# Patient Record
Sex: MALE | Race: WHITE | Employment: FULL TIME | URBAN - METROPOLITAN AREA
[De-identification: names, ages, dates, MRNs, and addresses within clinical notes are randomized per-mention and may not be internally consistent; named-entity substitution may affect disease eponyms.]

---

## 2017-05-31 ENCOUNTER — TELEPHONE (OUTPATIENT)
Dept: FAMILY MEDICINE CLINIC | Age: 57
End: 2017-05-31

## 2017-05-31 DIAGNOSIS — E11.9 CONTROLLED TYPE 2 DIABETES MELLITUS WITHOUT COMPLICATION, WITHOUT LONG-TERM CURRENT USE OF INSULIN (HCC): ICD-10-CM

## 2017-05-31 DIAGNOSIS — I25.10 CORONARY ARTERY DISEASE INVOLVING NATIVE CORONARY ARTERY OF NATIVE HEART WITHOUT ANGINA PECTORIS: ICD-10-CM

## 2017-05-31 DIAGNOSIS — E78.00 HYPERCHOLESTEROLEMIA: Primary | ICD-10-CM

## 2017-05-31 NOTE — TELEPHONE ENCOUNTER
Pt called wanting to know if there are any lab work Dr. Sultana Lieberman would like him to get before his physical in July.  Please assist.

## 2017-07-27 ENCOUNTER — OFFICE VISIT (OUTPATIENT)
Dept: FAMILY MEDICINE CLINIC | Age: 57
End: 2017-07-27

## 2017-07-27 ENCOUNTER — HOSPITAL ENCOUNTER (OUTPATIENT)
Dept: LAB | Age: 57
Discharge: HOME | End: 2017-07-27
Payer: COMMERCIAL

## 2017-07-27 VITALS
SYSTOLIC BLOOD PRESSURE: 160 MMHG | BODY MASS INDEX: 40.43 KG/M2 | HEIGHT: 74 IN | DIASTOLIC BLOOD PRESSURE: 102 MMHG | RESPIRATION RATE: 16 BRPM | WEIGHT: 315 LBS | TEMPERATURE: 98.3 F | OXYGEN SATURATION: 93 % | HEART RATE: 77 BPM

## 2017-07-27 DIAGNOSIS — M19.90 ARTHRITIS: ICD-10-CM

## 2017-07-27 DIAGNOSIS — Z00.00 ROUTINE GENERAL MEDICAL EXAMINATION AT A HEALTH CARE FACILITY: ICD-10-CM

## 2017-07-27 DIAGNOSIS — I25.10 CORONARY ARTERY DISEASE INVOLVING NATIVE CORONARY ARTERY OF NATIVE HEART WITHOUT ANGINA PECTORIS: ICD-10-CM

## 2017-07-27 DIAGNOSIS — M79.641 HAND PAIN, RIGHT: ICD-10-CM

## 2017-07-27 DIAGNOSIS — N20.0 KIDNEY STONES: ICD-10-CM

## 2017-07-27 DIAGNOSIS — E11.9 CONTROLLED TYPE 2 DIABETES MELLITUS WITHOUT COMPLICATION, WITHOUT LONG-TERM CURRENT USE OF INSULIN (HCC): ICD-10-CM

## 2017-07-27 DIAGNOSIS — E78.00 HYPERCHOLESTEROLEMIA: ICD-10-CM

## 2017-07-27 DIAGNOSIS — N20.0 RENAL CALCULI: ICD-10-CM

## 2017-07-27 DIAGNOSIS — I10 ESSENTIAL HYPERTENSION, MALIGNANT: Primary | ICD-10-CM

## 2017-07-27 DIAGNOSIS — H60.549 ECZEMA OF EXTERNAL EAR, UNSPECIFIED LATERALITY: ICD-10-CM

## 2017-07-27 DIAGNOSIS — Z23 ENCOUNTER FOR IMMUNIZATION: ICD-10-CM

## 2017-07-27 DIAGNOSIS — I10 ESSENTIAL HYPERTENSION, MALIGNANT: ICD-10-CM

## 2017-07-27 DIAGNOSIS — L30.9 ECZEMA, UNSPECIFIED TYPE: ICD-10-CM

## 2017-07-27 PROBLEM — H60.543 ECZEMA OF BOTH EXTERNAL EARS: Status: ACTIVE | Noted: 2017-07-27

## 2017-07-27 LAB
ALBUMIN SERPL BCP-MCNC: 3.6 G/DL (ref 3.4–5)
ALBUMIN/GLOB SERPL: 1.2 {RATIO} (ref 0.8–1.7)
ALP SERPL-CCNC: 101 U/L (ref 45–117)
ALT SERPL-CCNC: 28 U/L (ref 16–61)
ANION GAP BLD CALC-SCNC: 8 MMOL/L (ref 3–18)
APPEARANCE UR: CLEAR
AST SERPL W P-5'-P-CCNC: 14 U/L (ref 15–37)
BASOPHILS # BLD AUTO: 0 K/UL (ref 0–0.06)
BASOPHILS # BLD: 0 % (ref 0–2)
BILIRUB SERPL-MCNC: 0.6 MG/DL (ref 0.2–1)
BILIRUB UR QL: NEGATIVE
BUN SERPL-MCNC: 15 MG/DL (ref 7–18)
BUN/CREAT SERPL: 17 (ref 12–20)
CALCIUM SERPL-MCNC: 8.1 MG/DL (ref 8.5–10.1)
CHLORIDE SERPL-SCNC: 101 MMOL/L (ref 100–108)
CHOLEST SERPL-MCNC: 188 MG/DL
CO2 SERPL-SCNC: 31 MMOL/L (ref 21–32)
COLOR UR: YELLOW
CREAT SERPL-MCNC: 0.86 MG/DL (ref 0.6–1.3)
CREAT UR-MCNC: 221.44 MG/DL (ref 30–125)
DIFFERENTIAL METHOD BLD: ABNORMAL
EOSINOPHIL # BLD: 0.1 K/UL (ref 0–0.4)
EOSINOPHIL NFR BLD: 2 % (ref 0–5)
ERYTHROCYTE [DISTWIDTH] IN BLOOD BY AUTOMATED COUNT: 14.6 % (ref 11.6–14.5)
EST. AVERAGE GLUCOSE BLD GHB EST-MCNC: 200 MG/DL
GLOBULIN SER CALC-MCNC: 3 G/DL (ref 2–4)
GLUCOSE SERPL-MCNC: 213 MG/DL (ref 74–99)
GLUCOSE UR STRIP.AUTO-MCNC: >1000 MG/DL
HBA1C MFR BLD: 8.6 % (ref 4.2–5.6)
HCT VFR BLD AUTO: 50.2 % (ref 36–48)
HDLC SERPL-MCNC: 41 MG/DL (ref 40–60)
HDLC SERPL: 4.6 {RATIO} (ref 0–5)
HGB BLD-MCNC: 16.6 G/DL (ref 13–16)
HGB UR QL STRIP: NEGATIVE
KETONES UR QL STRIP.AUTO: NEGATIVE MG/DL
LDLC SERPL CALC-MCNC: 120.4 MG/DL (ref 0–100)
LEUKOCYTE ESTERASE UR QL STRIP.AUTO: NEGATIVE
LIPID PROFILE,FLP: ABNORMAL
LYMPHOCYTES # BLD AUTO: 27 % (ref 21–52)
LYMPHOCYTES # BLD: 1.8 K/UL (ref 0.9–3.6)
MCH RBC QN AUTO: 29.7 PG (ref 24–34)
MCHC RBC AUTO-ENTMCNC: 33.1 G/DL (ref 31–37)
MCV RBC AUTO: 90 FL (ref 74–97)
MICROALBUMIN UR-MCNC: 2.9 MG/DL (ref 0–3)
MICROALBUMIN/CREAT UR-RTO: 13 MG/G (ref 0–30)
MONOCYTES # BLD: 0.7 K/UL (ref 0.05–1.2)
MONOCYTES NFR BLD AUTO: 10 % (ref 3–10)
NEUTS SEG # BLD: 3.9 K/UL (ref 1.8–8)
NEUTS SEG NFR BLD AUTO: 61 % (ref 40–73)
NITRITE UR QL STRIP.AUTO: NEGATIVE
PH UR STRIP: 6 [PH] (ref 5–8)
PLATELET # BLD AUTO: 186 K/UL (ref 135–420)
PMV BLD AUTO: 10.4 FL (ref 9.2–11.8)
POTASSIUM SERPL-SCNC: 4.4 MMOL/L (ref 3.5–5.5)
PROT SERPL-MCNC: 6.6 G/DL (ref 6.4–8.2)
PROT UR STRIP-MCNC: NEGATIVE MG/DL
PSA SERPL-MCNC: 0.7 NG/ML (ref 0–4)
RBC # BLD AUTO: 5.58 M/UL (ref 4.7–5.5)
SODIUM SERPL-SCNC: 140 MMOL/L (ref 136–145)
SP GR UR REFRACTOMETRY: >1.03 (ref 1–1.03)
TRIGL SERPL-MCNC: 133 MG/DL (ref ?–150)
TSH SERPL DL<=0.05 MIU/L-ACNC: 1.05 UIU/ML (ref 0.36–3.74)
URATE SERPL-MCNC: 4.4 MG/DL (ref 2.6–7.2)
UROBILINOGEN UR QL STRIP.AUTO: 1 EU/DL (ref 0.2–1)
VLDLC SERPL CALC-MCNC: 26.6 MG/DL
WBC # BLD AUTO: 6.5 K/UL (ref 4.6–13.2)

## 2017-07-27 PROCEDURE — 83036 HEMOGLOBIN GLYCOSYLATED A1C: CPT | Performed by: FAMILY MEDICINE

## 2017-07-27 PROCEDURE — 84550 ASSAY OF BLOOD/URIC ACID: CPT | Performed by: FAMILY MEDICINE

## 2017-07-27 PROCEDURE — 36415 COLL VENOUS BLD VENIPUNCTURE: CPT | Performed by: FAMILY MEDICINE

## 2017-07-27 PROCEDURE — 85025 COMPLETE CBC W/AUTO DIFF WBC: CPT | Performed by: FAMILY MEDICINE

## 2017-07-27 PROCEDURE — 80053 COMPREHEN METABOLIC PANEL: CPT | Performed by: FAMILY MEDICINE

## 2017-07-27 PROCEDURE — 82043 UR ALBUMIN QUANTITATIVE: CPT | Performed by: FAMILY MEDICINE

## 2017-07-27 PROCEDURE — 86803 HEPATITIS C AB TEST: CPT | Performed by: FAMILY MEDICINE

## 2017-07-27 PROCEDURE — 80061 LIPID PANEL: CPT | Performed by: FAMILY MEDICINE

## 2017-07-27 PROCEDURE — 84443 ASSAY THYROID STIM HORMONE: CPT | Performed by: FAMILY MEDICINE

## 2017-07-27 PROCEDURE — 81003 URINALYSIS AUTO W/O SCOPE: CPT | Performed by: FAMILY MEDICINE

## 2017-07-27 PROCEDURE — 84153 ASSAY OF PSA TOTAL: CPT | Performed by: FAMILY MEDICINE

## 2017-07-27 RX ORDER — HYDROCHLOROTHIAZIDE 12.5 MG/1
12.5 TABLET ORAL DAILY
Qty: 90 TAB | Refills: 4 | Status: SHIPPED | OUTPATIENT
Start: 2017-07-27 | End: 2018-07-10 | Stop reason: SDUPTHER

## 2017-07-27 RX ORDER — METFORMIN HYDROCHLORIDE 500 MG/1
500 TABLET ORAL
Qty: 90 TAB | Refills: 4 | Status: SHIPPED | OUTPATIENT
Start: 2017-07-27 | End: 2017-10-30

## 2017-07-27 RX ORDER — HYDROCORTISONE AND ACETIC ACID 20.75; 10.375 MG/ML; MG/ML
2 SOLUTION AURICULAR (OTIC) 2 TIMES DAILY
Qty: 10 ML | Refills: 12 | Status: SHIPPED | OUTPATIENT
Start: 2017-07-27 | End: 2018-07-10 | Stop reason: SDUPTHER

## 2017-07-27 RX ORDER — NAPROXEN 375 MG/1
375 TABLET ORAL 2 TIMES DAILY WITH MEALS
Qty: 180 TAB | Refills: 4 | Status: SHIPPED | OUTPATIENT
Start: 2017-07-27 | End: 2018-07-10 | Stop reason: SDUPTHER

## 2017-07-27 RX ORDER — AMLODIPINE BESYLATE 5 MG/1
5 TABLET ORAL DAILY
Qty: 90 TAB | Refills: 4 | Status: SHIPPED | OUTPATIENT
Start: 2017-07-27 | End: 2018-07-10 | Stop reason: SDUPTHER

## 2017-07-27 NOTE — PROGRESS NOTES
Jesus Landry is a 65 y.o. male patient presented to clinic for a complete physical. Pt c/o 5/10 right hand pain. Pt has many concerns that he would like to discuss with Dr. Gabriella Diaz. 1. Have you been to the ER, urgent care clinic since your last visit? Hospitalized since your last visit? No    2. Have you seen or consulted any other health care providers outside of the [address] [place] [address] since your last visit? Include any pap smears or colon screening. No     Learning Assessment 3/19/2014   PRIMARY LEARNER Patient   HIGHEST LEVEL OF EDUCATION - PRIMARY LEARNER  2 YEARS OF COLLEGE   BARRIERS PRIMARY LEARNER NONE   CO-LEARNER CAREGIVER No   PRIMARY LANGUAGE ENGLISH   LEARNER PREFERENCE PRIMARY READING     -     -   ANSWERED BY self   RELATIONSHIP SELF         Pt received Tdap vaccine 0.5ml in Right deltoid. Tolerated well. No signs or symptoms of distress noted. Most current VIS given and consent signed.

## 2017-07-27 NOTE — MR AVS SNAPSHOT
Visit Information Date & Time Provider Department Dept. Phone Encounter #  
 7/27/2017  7:30 AM Vijaya Benitez., [facility] [phone] [id number] Follow-up Instructions Return in about 3 months (around 10/27/2017) for EOV, HGAIC next visit. Upcoming Health Maintenance Date Due Hepatitis C Screening 1960 DTaP/Tdap/Td series (1 - Tdap) 5/10/2008 HEMOGLOBIN A1C Q6M 1/22/2016 FOOT EXAM Q1 4/28/2016 MICROALBUMIN Q1 4/28/2016 EYE EXAM RETINAL OR DILATED Q1 4/28/2016 LIPID PANEL Q1 7/22/2016 INFLUENZA AGE 9 TO ADULT 8/1/2017 COLONOSCOPY 6/4/2022 Allergies as of 7/27/2017  Review Complete On: 7/27/2017 By: Vijaya Benitez., DO No Known Allergies Current Immunizations  Reviewed on 7/27/2017 Name Date Influenza Vaccine 9/18/2013  8:35 AM  
 Influenza Vaccine Split 9/21/2011 TD Vaccine 5/9/2008 Tdap 7/27/2017  8:24 AM  
 ZZZ-RETIRED (DO NOT USE) Pneumococcal Vaccine (Unspecified Type) 9/21/2011 Reviewed by Vijaya Benitez., DO on 7/27/2017 at  8:11 AM  
 Reviewed by Vijaya Benitez., DO on 7/27/2017 at  8:12 AM  
You Were Diagnosed With   
  
 Codes Comments Essential hypertension, malignant    -  Primary ICD-10-CM: I10 
ICD-9-CM: 401.0 Hypercholesterolemia     ICD-10-CM: E78.00 ICD-9-CM: 272.0 Coronary artery disease involving native coronary artery of native heart without angina pectoris     ICD-10-CM: I25.10 ICD-9-CM: 414.01 Eczema of external ear, unspecified laterality     ICD-10-CM: H60.549 ICD-9-CM: 380.22 Controlled type 2 diabetes mellitus without complication, without long-term current use of insulin ([facility]     ICD-10-CM: E11.9 ICD-9-CM: 250.00 Routine general medical examination at a health care facility     ICD-10-CM: Z00.00 ICD-9-CM: V70.0 Renal calculi     ICD-10-CM: N20.0 ICD-9-CM: 592.0 Eczema, unspecified type     ICD-10-CM: L30.9 ICD-9-CM: 692.9 Hand pain, right     ICD-10-CM: M79.641 ICD-9-CM: 729.5 Arthritis     ICD-10-CM: M19.90 ICD-9-CM: 716.90 Encounter for immunization     ICD-10-CM: K83 ICD-9-CM: V03.89 Kidney stones     ICD-10-CM: N20.0 ICD-9-CM: 592.0 Vitals BP Pulse Temp Resp Height(growth percentile) Weight(growth percentile) (!) 160/102 (BP 1 Location: Left arm, BP Patient Position: Sitting) 77 98.3 °F (36.8 °C) (Oral) 16 6' 2\" (1.88 m) 347 lb 12.8 oz (157.8 kg) SpO2 BMI Smoking Status 93% 44.65 kg/m2 Former Smoker BMI and BSA Data Body Mass Index Body Surface Area 44.65 kg/m 2 2.87 m 2 Preferred Pharmacy Pharmacy Name Phone [facility] - [address], [address] [phone] Your Updated Medication List  
  
   
This list is accurate as of: 7/27/17  8:30 AM.  Always use your most recent med list. amLODIPine 5 mg tablet Commonly known as:  Achilles Winlock Take 1 Tab by mouth daily. aspirin 81 mg chewable tablet Take 81 mg by mouth daily. hydroCHLOROthiazide 12.5 mg tablet Commonly known as:  HYDRODIURIL Take 1 Tab by mouth daily. hydrocortisone-acetic acid otic solution Commonly known as:  VOSOL-HC Administer 2 Drops into each ear two (2) times a day. metFORMIN 500 mg tablet Commonly known as:  GLUCOPHAGE Take 1 Tab by mouth daily (with breakfast). naproxen 375 mg tablet Commonly known as:  NAPROSYN Take 1 Tab by mouth two (2) times daily (with meals). Prescriptions Sent to Pharmacy Refills  
 metFORMIN (GLUCOPHAGE) 500 mg tablet 4 Sig: Take 1 Tab by mouth daily (with breakfast). Class: Normal  
 Pharmacy: [address] [facility] [address], [address] Ph #: [phone] Route: Oral  
 amLODIPine (NORVASC) 5 mg tablet 4 Sig: Take 1 Tab by mouth daily. Class: Normal  
 Pharmacy: [address] [facility], [address] Ph #: [phone] Route: Oral  
 hydroCHLOROthiazide (HYDRODIURIL) 12.5 mg tablet 4 Sig: Take 1 Tab by mouth daily. Class: Normal  
 Pharmacy: [address], [address] Ph #: [phone] Route: Oral  
 naproxen (NAPROSYN) 375 mg tablet 4 Sig: Take 1 Tab by mouth two (2) times daily (with meals). Class: Normal  
 Pharmacy: [facility] [address], [address] Ph #: [phone] Route: Oral  
 hydrocortisone-acetic acid (VOSOL-HC) otic solution 12 Sig: Administer 2 Drops into each ear two (2) times a day. Class: Normal  
 Pharmacy: [address], [address] Ph #: [phone] Route: Both Ears We Performed the Following AMB POC EKG ROUTINE W/ 12 LEADS, INTER & REP [92821 CPT(R)] TETANUS, DIPHTHERIA TOXOIDS AND ACELLULAR PERTUSSIS VACCINE (TDAP), IN INDIVIDS. >=7, IM [id number] CPT(R)] Follow-up Instructions Return in about 3 months (around 10/27/2017) for EOV, HGAIC next visit. To-Do List   
 07/27/2017 Lab:  HEPATITIS C AB   
  
 07/27/2017 Lab:  URIC ACID Introducing [facility]! Dear Kate Polanco: 
Thank you for requesting a TELOS account. Our records indicate that you have previously registered for a TELOS account but its currently inactive. Please call our TELOS support line at [phone]. Additional Information If you have questions, please visit the Frequently Asked Questions section of the TELOS website at [email or web address] Remember, TELOS is NOT to be used for urgent needs. For medical emergencies, dial 911. Now available from your iPhone and Android! Please provide this summary of care documentation to your next provider. Your primary care clinician is listed as [id number] [facility]. If you have any questions after today's visit, please call [phone].
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
## 2017-07-28 LAB
HCV AB SER IA-ACNC: 0.08 INDEX
HCV AB SERPL QL IA: NEGATIVE
HCV COMMENT,HCGAC: NORMAL

## 2017-10-30 ENCOUNTER — OFFICE VISIT (OUTPATIENT)
Dept: FAMILY MEDICINE CLINIC | Age: 57
End: 2017-10-30

## 2017-10-30 VITALS
HEIGHT: 74 IN | SYSTOLIC BLOOD PRESSURE: 138 MMHG | OXYGEN SATURATION: 93 % | TEMPERATURE: 98.6 F | HEART RATE: 91 BPM | BODY MASS INDEX: 40.43 KG/M2 | DIASTOLIC BLOOD PRESSURE: 100 MMHG | WEIGHT: 315 LBS | RESPIRATION RATE: 16 BRPM

## 2017-10-30 DIAGNOSIS — E11.9 CONTROLLED TYPE 2 DIABETES MELLITUS WITHOUT COMPLICATION, WITHOUT LONG-TERM CURRENT USE OF INSULIN (HCC): Primary | ICD-10-CM

## 2017-10-30 DIAGNOSIS — I10 ESSENTIAL HYPERTENSION, MALIGNANT: ICD-10-CM

## 2017-10-30 DIAGNOSIS — E78.00 HYPERCHOLESTEROLEMIA: ICD-10-CM

## 2017-10-30 LAB — HBA1C MFR BLD HPLC: 7.3 %

## 2017-10-30 RX ORDER — METFORMIN HYDROCHLORIDE 500 MG/1
500 TABLET ORAL
Qty: 90 TAB | Refills: 4 | Status: SHIPPED | OUTPATIENT
Start: 2017-10-30 | End: 2018-07-10 | Stop reason: SDUPTHER

## 2017-10-30 NOTE — PROGRESS NOTES
Christo Magallon is a 65 y.o. male presented to clinic for lab results. Pt denies any pain but Has concerns about HTN and DM. 1. Have you been to the ER, urgent care clinic since your last visit? Hospitalized since your last visit? No    2. Have you seen or consulted any other health care providers outside of the [address] [place] [address] since your last visit? Include any pap smears or colon screening.  No     Learning Assessment 3/19/2014   PRIMARY LEARNER Patient   HIGHEST LEVEL OF EDUCATION - PRIMARY LEARNER  2 YEARS OF COLLEGE   BARRIERS PRIMARY LEARNER NONE   CO-LEARNER CAREGIVER No   PRIMARY LANGUAGE ENGLISH   LEARNER PREFERENCE PRIMARY READING     -     -   ANSWERED BY self   RELATIONSHIP SELF

## 2017-10-30 NOTE — MR AVS SNAPSHOT
Visit Information Date & Time Provider Department Dept. Phone Encounter #  
 10/30/2017 12:30 PM Alex Wilcox [phone] Follow-up Instructions Return in about 3 months (around 1/30/2018) for EOV, HGAIC next visit. Upcoming Health Maintenance Date Due  
 FOOT EXAM Q1 4/28/2016 EYE EXAM RETINAL OR DILATED Q1 4/28/2016 INFLUENZA AGE 9 TO ADULT 8/1/2017 HEMOGLOBIN A1C Q6M 1/27/2018 MICROALBUMIN Q1 7/27/2018 LIPID PANEL Q1 7/27/2018 COLONOSCOPY 6/4/2022 DTaP/Tdap/Td series (2 - Td) 7/27/2027 Allergies as of 10/30/2017  Review Complete On: 10/30/2017 By: Yane Richey., DO No Known Allergies Current Immunizations  Reviewed on 7/27/2017 Name Date Influenza Vaccine 9/18/2013  8:35 AM  
 Influenza Vaccine Split 9/21/2011 TD Vaccine 5/9/2008 Tdap 7/27/2017  8:24 AM  
 ZZZ-RETIRED (DO NOT USE) Pneumococcal Vaccine (Unspecified Type) 9/21/2011 Not reviewed this visit You Were Diagnosed With   
  
 Codes Comments Controlled type 2 diabetes mellitus without complication, without long-term current use of insulin ([facility]    -  Primary ICD-10-CM: E11.9 ICD-9-CM: 250.00 Hypercholesterolemia     ICD-10-CM: E78.00 ICD-9-CM: 272.0 Essential hypertension, malignant     ICD-10-CM: I10 
ICD-9-CM: 401.0 Vitals BP Pulse Temp Resp Height(growth percentile) Weight(growth percentile) (!) 138/100 (BP 1 Location: Left arm) 91 98.6 °F (37 °C) (Oral) 16 6' 2\" (1.88 m) 339 lb 6.4 oz (154 kg) SpO2 BMI Smoking Status 93% 43.58 kg/m2 Former Smoker Vitals History BMI and BSA Data Body Mass Index Body Surface Area 43.58 kg/m 2 2.84 m 2 Preferred Pharmacy Pharmacy Name Phone [facility] - [address], [address] [phone] Your Updated Medication List  
  
   
 This list is accurate as of: 10/30/17  1:03 PM.  Always use your most recent med list. amLODIPine 5 mg tablet Commonly known as:  [place] Take 1 Tab by mouth daily. aspirin 81 mg chewable tablet Take 81 mg by mouth daily. hydroCHLOROthiazide 12.5 mg tablet Commonly known as:  HYDRODIURIL Take 1 Tab by mouth daily. hydrocortisone-acetic acid otic solution Commonly known as:  VOSOL-HC Administer 2 Drops into each ear two (2) times a day. metFORMIN 500 mg tablet Commonly known as:  GLUCOPHAGE Take 1 Tab by mouth daily (with breakfast). naproxen 375 mg tablet Commonly known as:  NAPROSYN Take 1 Tab by mouth two (2) times daily (with meals). Prescriptions Sent to Pharmacy Refills  
 metFORMIN (GLUCOPHAGE) 500 mg tablet 4 Sig: Take 1 Tab by mouth daily (with breakfast). Class: Normal  
 Pharmacy: [address] [place], [place] [address]y I, [address] Ph #: [phone] Route: Oral  
  
We Performed the Following AMB POC HEMOGLOBIN A1C [08262 CPT(R)] Follow-up Instructions Return in about 3 months (around 1/30/2018) for EOV, HGAIC next visit. Introducing [facility]! Sulema Raya introduces Pixate patient portal. Now you can access parts of your medical record, email your doctor's office, and request medication refills online. 1. In your internet browser, go to [email or web address] 2. Click on the First Time User? Click Here link in the Sign In box. You will see the New Member Sign Up page. 3. Enter your Pixate Access Code exactly as it appears below. You will not need to use this code after youve completed the sign-up process. If you do not sign up before the expiration date, you must request a new code. · Pixate Access Code: [email or web address] Expires: 11/11/2017  3:08 PM 
 
4.  Enter the last four digits of your Social Security Number (xxxx) and Date of Birth (mm/dd/yyyy) as indicated and click Submit. You will be taken to the next sign-up page. 5. Create a Catchafire ID. This will be your Catchafire login ID and cannot be changed, so think of one that is secure and easy to remember. 6. Create a Catchafire password. You can change your password at any time. 7. Enter your Password Reset Question and Answer. This can be used at a later time if you forget your password. 8. Enter your e-mail address. You will receive e-mail notification when new information is available in [address] 9. Click Sign Up. You can now view and download portions of your medical record. 10. Click the Download Summary menu link to download a portable copy of your medical information. If you have questions, please visit the Frequently Asked Questions section of the Catchafire website. Remember, Catchafire is NOT to be used for urgent needs. For medical emergencies, dial 911. Now available from your iPhone and Android! Please provide this summary of care documentation to your next provider. Your primary care clinician is listed as [id number] [facility]. If you have any questions after today's visit, please call [phone].
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
## 2017-12-14 ENCOUNTER — OFFICE VISIT (OUTPATIENT)
Dept: FAMILY MEDICINE CLINIC | Age: 57
End: 2017-12-14

## 2017-12-14 VITALS
HEART RATE: 80 BPM | OXYGEN SATURATION: 94 % | HEIGHT: 74 IN | WEIGHT: 315 LBS | RESPIRATION RATE: 18 BRPM | DIASTOLIC BLOOD PRESSURE: 100 MMHG | BODY MASS INDEX: 40.43 KG/M2 | SYSTOLIC BLOOD PRESSURE: 138 MMHG | TEMPERATURE: 98.8 F

## 2017-12-14 DIAGNOSIS — S81.812A LACERATION OF LEFT LOWER EXTREMITY, INITIAL ENCOUNTER: Primary | ICD-10-CM

## 2017-12-14 PROBLEM — E66.01 OBESITY, MORBID (HCC): Status: ACTIVE | Noted: 2017-12-14

## 2017-12-14 RX ORDER — CEPHALEXIN 500 MG/1
500 CAPSULE ORAL 4 TIMES DAILY
Qty: 40 CAP | Refills: 0 | Status: SHIPPED | OUTPATIENT
Start: 2017-12-14 | End: 2017-12-21 | Stop reason: SDUPTHER

## 2017-12-14 RX ORDER — MUPIROCIN 20 MG/G
OINTMENT TOPICAL 4 TIMES DAILY
Qty: 22 G | Refills: 12 | Status: SHIPPED | OUTPATIENT
Start: 2017-12-14 | End: 2017-12-21 | Stop reason: SDUPTHER

## 2017-12-14 NOTE — PROGRESS NOTES
Ceasar Dale is a 65 y.o. male presents today for left leg injury last saturday. Patient reports that he fell and there was a large knot on his left leg. Patient reports that the left leg is still painful but hot to touch. Patient reports that his left leg continues to swell at times. Pt is in Room # 5        1. Have you been to the ER, urgent care clinic since your last visit? Hospitalized since your last visit? No    2. Have you seen or consulted any other health care providers outside of the [address] [place] [address] since your last visit? Include any pap smears or colon screening.  No

## 2017-12-14 NOTE — MR AVS SNAPSHOT
Visit Information Date & Time Provider Department Dept. Phone Encounter #  
 12/14/2017 12:30 PM Shellie Mckinley., [address] [facility] [phone] [id number] Follow-up Instructions Return in about 1 week (around 12/21/2017) for rov. Your Appointments 1/30/2018 12:30 PM  
ROUTINE CARE with [place].[place] [address] [facility] [address] [place] [address]) Appt Note: Return in about 3 months (around 1/30/2018) for Cuco Arellano next visit. [address]  
  
   
 [address] [place] [address] [address] Upcoming Health Maintenance Date Due  
 FOOT EXAM Q1 4/28/2016 EYE EXAM RETINAL OR DILATED Q1 4/28/2016 Influenza Age 5 to Adult 8/1/2017 HEMOGLOBIN A1C Q6M 4/30/2018 MICROALBUMIN Q1 7/27/2018 LIPID PANEL Q1 7/27/2018 COLONOSCOPY 6/4/2022 DTaP/Tdap/Td series (2 - Td) 7/27/2027 Allergies as of 12/14/2017  Review Complete On: 10/30/2017 By: Shellie Mckinley.DO No Known Allergies Current Immunizations  Reviewed on 7/27/2017 Name Date Influenza Vaccine 9/18/2013  8:35 AM  
 Influenza Vaccine Split 9/21/2011 TD Vaccine 5/9/2008 Tdap 7/27/2017  8:24 AM  
 ZZZ-RETIRED (DO NOT USE) Pneumococcal Vaccine (Unspecified Type) 9/21/2011 Not reviewed this visit You Were Diagnosed With   
  
 Codes Comments Laceration of left lower extremity, initial encounter    -  Primary ICD-10-CM: E07.050R ICD-9-CM: 894.0 Vitals BP Pulse Temp Resp Height(growth percentile) Weight(growth percentile) (!) 138/100 (BP 1 Location: Right arm, BP Patient Position: Sitting) 80 98.8 °F (37.1 °C) (Oral) 18 6' 2\" (1.88 m) 345 lb 12.8 oz (156.9 kg) SpO2 BMI Smoking Status 94% 44.4 kg/m2 Former Smoker BMI and BSA Data Body Mass Index Body Surface Area 44.4 kg/m 2 2.86 m 2 Preferred Pharmacy Pharmacy Name Phone [facility] - [address] [facility], [address] [phone] Your Updated Medication List  
  
   
This list is accurate as of: 12/14/17 12:59 PM.  Always use your most recent med list. amLODIPine 5 mg tablet Commonly known as:  [place]ora Conine Take 1 Tab by mouth daily. aspirin 81 mg chewable tablet Take 81 mg by mouth daily. cephALEXin 500 mg capsule Commonly known as:  Noa Other Take 1 Cap by mouth four (4) times daily for 10 days. hydroCHLOROthiazide 12.5 mg tablet Commonly known as:  HYDRODIURIL Take 1 Tab by mouth daily. hydrocortisone-acetic acid otic solution Commonly known as:  VOSOL-HC Administer 2 Drops into each ear two (2) times a day. metFORMIN 500 mg tablet Commonly known as:  GLUCOPHAGE Take 1 Tab by mouth daily (with breakfast). mupirocin 2 % ointment Commonly known as:  [facility] Apply  to affected area four (4) times daily. naproxen 375 mg tablet Commonly known as:  NAPROSYN Take 1 Tab by mouth two (2) times daily (with meals). Prescriptions Sent to Pharmacy Refills  
 mupirocin (BACTROBAN) 2 % ointment 12 Sig: Apply  to affected area four (4) times daily. Class: Normal  
 [facility] [place] [place] Ph #: [phone] Route: Topical  
 cephALEXin (KEFLEX) 500 mg capsule 0 Sig: Take 1 Cap by mouth four (4) times daily for 10 days. Class: Normal  
 Pharmacy: [address] [place] [address], [place] Ph #: [phone] Route: Oral  
  
Follow-up Instructions Return in about 1 week (around 12/21/2017) for rov. Introducing [facility]! Juan Ramon Mike introduces [employer] patient portal. Now you can access parts of your medical record, email your doctor's office, and request medication refills online. 1. In your internet browser, go to [email or web address] 2. Click on the First Time User? Click Here link in the Sign In box. You will see the New Member Sign Up page. 3. Enter your Contractually Access Code exactly as it appears below. You will not need to use this code after youve completed the sign-up process. If you do not sign up before the expiration date, you must request a new code. · Contractually Access Code: [email or web address] Expires: 3/14/2018 12:59 PM 
 
4. Enter the last four digits of your Social Security Number (xxxx) and Date of Birth (mm/dd/yyyy) as indicated and click Submit. You will be taken to the next sign-up page. 5. Create a Contractually ID. This will be your Contractually login ID and cannot be changed, so think of one that is secure and easy to remember. 6. Create a Contractually password. You can change your password at any time. 7. Enter your Password Reset Question and Answer. This can be used at a later time if you forget your password. 8. Enter your e-mail address. You will receive e-mail notification when new information is available in [address] 9. Click Sign Up. You can now view and download portions of your medical record. 10. Click the Download Summary menu link to download a portable copy of your medical information. If you have questions, please visit the Frequently Asked Questions section of the Contractually website. Remember, Contractually is NOT to be used for urgent needs. For medical emergencies, dial 911. Now available from your iPhone and Android! Please provide this summary of care documentation to your next provider. Your primary care clinician is listed as [address]. If you have any questions after today's visit, please call [phone].
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
## 2017-12-21 ENCOUNTER — OFFICE VISIT (OUTPATIENT)
Dept: FAMILY MEDICINE CLINIC | Age: 57
End: 2017-12-21

## 2017-12-21 VITALS
HEART RATE: 83 BPM | OXYGEN SATURATION: 94 % | RESPIRATION RATE: 18 BRPM | DIASTOLIC BLOOD PRESSURE: 92 MMHG | HEIGHT: 74 IN | TEMPERATURE: 97.3 F | SYSTOLIC BLOOD PRESSURE: 141 MMHG | BODY MASS INDEX: 40.43 KG/M2 | WEIGHT: 315 LBS

## 2017-12-21 DIAGNOSIS — E11.8 CONTROLLED TYPE 2 DIABETES MELLITUS WITH COMPLICATION, WITHOUT LONG-TERM CURRENT USE OF INSULIN (HCC): Primary | ICD-10-CM

## 2017-12-21 DIAGNOSIS — S81.812A LACERATION OF LEFT LOWER EXTREMITY, INITIAL ENCOUNTER: ICD-10-CM

## 2017-12-21 LAB — HBA1C MFR BLD HPLC: 7.5 %

## 2017-12-21 RX ORDER — CEPHALEXIN 500 MG/1
500 CAPSULE ORAL 4 TIMES DAILY
Qty: 40 CAP | Refills: 0 | Status: SHIPPED | OUTPATIENT
Start: 2017-12-21 | End: 2018-01-03 | Stop reason: SDUPTHER

## 2017-12-21 RX ORDER — MUPIROCIN 20 MG/G
OINTMENT TOPICAL 4 TIMES DAILY
Qty: 22 G | Refills: 12 | Status: SHIPPED | OUTPATIENT
Start: 2017-12-21 | End: 2018-01-09

## 2017-12-21 NOTE — PROGRESS NOTES
Vivi Hayes is a 65 y.o. male presented to clinic for a routine f/u for DM. Pt c/o 4/10 left leg pain. 1. Have you been to the ER, urgent care clinic since your last visit? Hospitalized since your last visit? No    2. Have you seen or consulted any other health care providers outside of the [address] [place] [address] since your last visit? Include any pap smears or colon screening. No     Learning Assessment 3/19/2014   PRIMARY LEARNER Patient   HIGHEST LEVEL OF EDUCATION - PRIMARY LEARNER  2 YEARS OF COLLEGE   BARRIERS PRIMARY LEARNER NONE   CO-LEARNER CAREGIVER No   PRIMARY LANGUAGE ENGLISH   LEARNER PREFERENCE PRIMARY READING     -     -   ANSWERED BY self   RELATIONSHIP SELF     Health Maintenance Due   Topic Date Due    EYE EXAM RETINAL OR DILATED Q1  04/28/2016     Pt states that he had a routine eye exam at [address] in [place] sometime this year.

## 2017-12-21 NOTE — MR AVS SNAPSHOT
Visit Information Date & Time Provider Department Dept. Phone Encounter #  
 12/21/2017  7:30 AM Elida Murray., [address] [facility] [phone] [id number] Follow-up Instructions Return in about 2 weeks (around 1/4/2018) for rov. Your Appointments 1/30/2018 12:30 PM  
[facility] [place] [place] [address] [facility] [address] [place] [address]) Appt Note: Return in about 3 months (around 1/30/2018) for Sabiha Alanis next visit. [address]  
  
   
 [address] [facility] [address] Upcoming Health Maintenance Date Due  
 EYE EXAM RETINAL OR DILATED Q1 4/28/2016 HEMOGLOBIN A1C Q6M 4/30/2018 MICROALBUMIN Q1 7/27/2018 LIPID PANEL Q1 7/27/2018 FOOT EXAM Q1 12/21/2018 COLONOSCOPY 6/4/2022 DTaP/Tdap/Td series (2 - Td) 7/27/2027 Allergies as of 12/21/2017  Review Complete On: 12/21/2017 By: Elida Vann DO No Known Allergies Current Immunizations  Reviewed on 7/27/2017 Name Date Influenza Vaccine 9/18/2013  8:35 AM  
 Influenza Vaccine Split 9/21/2011 TD Vaccine 5/9/2008 Tdap 7/27/2017  8:24 AM  
 ZZZ-RETIRED (DO NOT USE) Pneumococcal Vaccine (Unspecified Type) 9/21/2011 Not reviewed this visit You Were Diagnosed With   
  
 Codes Comments Controlled type 2 diabetes mellitus with complication, without long-term current use of insulin ([facility] [address]    -  Primary ICD-10-CM: E11.8 ICD-9-CM: 250.90 Laceration of left lower extremity, initial encounter     ICD-10-CM: N77.887F ICD-9-CM: 894.0 Vitals BP Pulse Temp Resp Height(growth percentile) Weight(growth percentile) (!) 141/92 (BP 1 Location: Right arm, BP Patient Position: Sitting) 83 97.3 °F (36.3 °C) (Oral) 18 6' 2\" (1.88 m) 348 lb 12.8 oz (158.2 kg) SpO2 BMI Smoking Status 94% 44.78 kg/m2 Former Smoker BMI and BSA Data Body Mass Index Body Surface Area 44.78 kg/m 2 2.87 m 2 Preferred Pharmacy Pharmacy Name Phone [facility] [address], [address] [phone] Your Updated Medication List  
  
   
This list is accurate as of: 12/21/17  7:49 AM.  Always use your most recent med list. amLODIPine 5 mg tablet Commonly known as:  Suarez Mullet Take 1 Tab by mouth daily. aspirin 81 mg chewable tablet Take 81 mg by mouth daily. cephALEXin 500 mg capsule Commonly known as:  Sergio Bless Take 1 Cap by mouth four (4) times daily for 10 days. hydroCHLOROthiazide 12.5 mg tablet Commonly known as:  HYDRODIURIL Take 1 Tab by mouth daily. hydrocortisone-acetic acid otic solution Commonly known as:  VOSOL-HC Administer 2 Drops into each ear two (2) times a day. metFORMIN 500 mg tablet Commonly known as:  GLUCOPHAGE Take 1 Tab by mouth daily (with breakfast). mupirocin 2 % ointment Commonly known as:  [employer] Apply  to affected area four (4) times daily. naproxen 375 mg tablet Commonly known as:  NAPROSYN Take 1 Tab by mouth two (2) times daily (with meals). Prescriptions Sent to Pharmacy Refills  
 cephALEXin (KEFLEX) 500 mg capsule 0 Sig: Take 1 Cap by mouth four (4) times daily for 10 days. Class: Normal  
 [facility] [address] [place] Ph #: [phone] Route: Oral  
 mupirocin (BACTROBAN) 2 % ointment 12 Sig: Apply  to affected area four (4) times daily. Class: Normal  
 [facility] [address] [place] Ph #: [phone] Route: Topical  
  
We Performed the Following AMB POC HEMOGLOBIN A1C [98399 CPT(R)] Follow-up Instructions Return in about 2 weeks (around 1/4/2018) for rov. Introducing [facility]!    
 Daneila Villa introduces [employer] patient portal. Now you can access parts of your medical record, email your doctor's office, and request medication refills online. 1. In your internet browser, go to [email or web address] 2. Click on the First Time User? Click Here link in the Sign In box. You will see the New Member Sign Up page. 3. Enter your SafeStore Access Code exactly as it appears below. You will not need to use this code after youve completed the sign-up process. If you do not sign up before the expiration date, you must request a new code. · SafeStore Access Code: [email or web address] Expires: 3/14/2018 12:59 PM 
 
4. Enter the last four digits of your Social Security Number (xxxx) and Date of Birth (mm/dd/yyyy) as indicated and click Submit. You will be taken to the next sign-up page. 5. Create a SafeStore ID. This will be your SafeStore login ID and cannot be changed, so think of one that is secure and easy to remember. 6. Create a SafeStore password. You can change your password at any time. 7. Enter your Password Reset Question and Answer. This can be used at a later time if you forget your password. 8. Enter your e-mail address. You will receive e-mail notification when new information is available in [address] 9. Click Sign Up. You can now view and download portions of your medical record. 10. Click the Download Summary menu link to download a portable copy of your medical information. If you have questions, please visit the Frequently Asked Questions section of the SafeStore website. Remember, SafeStore is NOT to be used for urgent needs. For medical emergencies, dial 911. Now available from your iPhone and Android! Please provide this summary of care documentation to your next provider. Your primary care clinician is listed as [address]. If you have any questions after today's visit, please call [phone].
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
## 2018-01-03 DIAGNOSIS — S81.812A LACERATION OF LEFT LOWER EXTREMITY, INITIAL ENCOUNTER: ICD-10-CM

## 2018-01-03 RX ORDER — CEPHALEXIN 500 MG/1
500 CAPSULE ORAL 4 TIMES DAILY
Qty: 40 CAP | Refills: 0 | Status: SHIPPED | OUTPATIENT
Start: 2018-01-03 | End: 2018-01-09

## 2018-01-03 NOTE — TELEPHONE ENCOUNTER
Patient is asking for a refill on cephALEXin (KEFLEX) 500 mg capsule. He had an appointment for tomorrow but cancelled it due to the weather.  Send to [facility]

## 2018-01-09 ENCOUNTER — OFFICE VISIT (OUTPATIENT)
Dept: FAMILY MEDICINE CLINIC | Age: 58
End: 2018-01-09

## 2018-01-09 VITALS
BODY MASS INDEX: 40.43 KG/M2 | HEART RATE: 90 BPM | SYSTOLIC BLOOD PRESSURE: 134 MMHG | TEMPERATURE: 97.7 F | OXYGEN SATURATION: 95 % | RESPIRATION RATE: 16 BRPM | DIASTOLIC BLOOD PRESSURE: 86 MMHG | WEIGHT: 315 LBS | HEIGHT: 74 IN

## 2018-01-09 DIAGNOSIS — Z12.11 SCREENING FOR COLORECTAL CANCER: Primary | ICD-10-CM

## 2018-01-09 DIAGNOSIS — Z12.12 SCREENING FOR COLORECTAL CANCER: Primary | ICD-10-CM

## 2018-01-09 NOTE — PROGRESS NOTES
Rm 4  Pt presents to the clinic for a follow-up regarding his leg injury. Flu Shot Requested: no    Depression Screening:  PHQ over the last two weeks 1/9/2018 12/21/2017 10/30/2017 7/27/2017 4/26/2016 4/28/2015 3/19/2014   Little interest or pleasure in doing things Not at all Not at all Not at all Not at all Not at all Not at all Not at all   Feeling down, depressed or hopeless Not at all Not at all Not at all Not at all Not at all Not at all Not at all   Total Score PHQ 2 0 0 0 0 0 0 0       Learning Assessment:  Learning Assessment 3/19/2014 9/18/2013 3/6/2013   PRIMARY LEARNER Patient Patient Patient   HIGHEST LEVEL OF EDUCATION - PRIMARY LEARNER  2 Ga Yoel Kennedy - -   CO-LEARNER CAREGIVER No - -   [facility]   LEARNER PREFERENCE PRIMARY READING READING READING     - DEMONSTRATION -     - LISTENING -   ANSWERED BY self patient patient   RELATIONSHIP SELF SELF SELF       Abuse Screening:  Abuse Screening Questionnaire 3/19/2014   Do you ever feel afraid of your partner? N   Are you in a relationship with someone who physically or mentally threatens you? N   Is it safe for you to go home? Y       Health Maintenance reviewed and discussed per provider: yes     Coordination of Care:    1. Have you been to the ER, urgent care clinic since your last visit? Hospitalized since your last visit? no    2. Have you seen or consulted any other health care providers outside of the [address] [place] [address] since your last visit? Include any pap smears or colon screening.  no

## 2018-01-09 NOTE — MR AVS SNAPSHOT
Visit Information Date & Time Provider Department Dept. Phone Encounter #  
 1/9/2018 12:30 PM Griffin Hanley [phone] [id number] Follow-up Instructions Return in about 6 months (around 7/9/2018) for physical, labs at next visit, EKG next visit, Cyndie Beard next visit. Your Appointments 1/30/2018 12:30 PM  
ROUTINE [place] [address]) Appt Note: Return in about 3 months (around 1/30/2018) for Arnulfo Fernández next visit. [address] [facility] [address]  
  
   
 [address] [place] [address] [address] Upcoming Health Maintenance Date Due  
 EYE EXAM RETINAL OR DILATED Q1 4/28/2016 HEMOGLOBIN A1C Q6M 6/21/2018 MICROALBUMIN Q1 7/27/2018 LIPID PANEL Q1 7/27/2018 FOOT EXAM Q1 12/21/2018 COLONOSCOPY 6/4/2022 DTaP/Tdap/Td series (2 - Td) 7/27/2027 Allergies as of 1/9/2018  Review Complete On: 1/9/2018 By: Betty Callaway DO No Known Allergies Current Immunizations  Reviewed on 7/27/2017 Name Date Influenza Vaccine 9/18/2013  8:35 AM  
 Influenza Vaccine Split 9/21/2011 TD Vaccine 5/9/2008 Tdap 7/27/2017  8:24 AM  
 ZZZ-RETIRED (DO NOT USE) Pneumococcal Vaccine (Unspecified Type) 9/21/2011 Not reviewed this visit You Were Diagnosed With   
  
 Codes Comments Screening for colorectal cancer    -  Primary ICD-10-CM: Z12.11, Z12.12 
ICD-9-CM: V76.51 Vitals BP Pulse Temp Resp Height(growth percentile) Weight(growth percentile) 134/86 (BP 1 Location: Right arm, BP Patient Position: Sitting) 90 97.7 °F (36.5 °C) (Oral) 16 6' 2\" (1.88 m) 346 lb (156.9 kg) SpO2 BMI Smoking Status 95% 44.42 kg/m2 Former Smoker Vitals History BMI and BSA Data Body Mass Index Body Surface Area 44.42 kg/m 2 2.86 m 2 Preferred Pharmacy Pharmacy Name Phone Counts include 234 beds at the [facility] - [address] [facility] [place], [address] [phone] Your Updated Medication List  
  
   
This list is accurate as of: 1/9/18 12:38 PM.  Always use your most recent med list. amLODIPine 5 mg tablet Commonly known as:  Valentina Glatter Take 1 Tab by mouth daily. aspirin 81 mg chewable tablet Take 81 mg by mouth daily. hydroCHLOROthiazide 12.5 mg tablet Commonly known as:  HYDRODIURIL Take 1 Tab by mouth daily. hydrocortisone-acetic acid otic solution Commonly known as:  VOSOL-HC Administer 2 Drops into each ear two (2) times a day. metFORMIN 500 mg tablet Commonly known as:  GLUCOPHAGE Take 1 Tab by mouth daily (with breakfast). naproxen 375 mg tablet Commonly known as:  NAPROSYN Take 1 Tab by mouth two (2) times daily (with meals). We Performed the Following REFERRAL TO GASTROENTEROLOGY [WUA46 Custom] Comments:  
 Please evaluate patient for routine colnoscopy . 1st available is fine. Follow-up Instructions Return in about 6 months (around 7/9/2018) for physical, labs at next visit, EKG next visit, [facility]. Referral Information Referral ID Referred By Referred To  
  
 [id number] Jasen Mayberry[address], [address] Phone: [phone] Fax: [phone] Visits Status Start Date End Date 1 New Request 1/9/18 1/9/19 If your referral has a status of pending review or denied, additional information will be sent to support the outcome of this decision. Introducing [facility] & HEALTH SERVICES! [facility] introduces [employer] patient portal. Now you can access parts of your medical record, email your doctor's office, and request medication refills online. 1. In your internet browser, go to [email or web address] 2. Click on the First Time User? Click Here link in the Sign In box.  You will see the New Member Sign Up page. 3. Enter your [employer] Access Code exactly as it appears below. You will not need to use this code after youve completed the sign-up process. If you do not sign up before the expiration date, you must request a new code. · [employer] Access Code: [email or web address] Expires: 3/14/2018 12:59 PM 
 
4. Enter the last four digits of your Social Security Number (xxxx) and Date of Birth (mm/dd/yyyy) as indicated and click Submit. You will be taken to the next sign-up page. 5. Create a [employer] ID. This will be your [employer] login ID and cannot be changed, so think of one that is secure and easy to remember. 6. Create a [employer] password. You can change your password at any time. 7. Enter your Password Reset Question and Answer. This can be used at a later time if you forget your password. 8. Enter your e-mail address. You will receive e-mail notification when new information is available in [address] 9. Click Sign Up. You can now view and download portions of your medical record. 10. Click the Download Summary menu link to download a portable copy of your medical information. If you have questions, please visit the Frequently Asked Questions section of the [employer] website. Remember, [employer] is NOT to be used for urgent needs. For medical emergencies, dial 911. Now available from your iPhone and Android! Please provide this summary of care documentation to your next provider. Your primary care clinician is listed as [id number] [facility]. If you have any questions after today's visit, please call [phone].

## 2018-07-10 ENCOUNTER — OFFICE VISIT (OUTPATIENT)
Dept: FAMILY MEDICINE CLINIC | Age: 58
End: 2018-07-10

## 2018-07-10 VITALS
SYSTOLIC BLOOD PRESSURE: 146 MMHG | TEMPERATURE: 98.4 F | HEART RATE: 83 BPM | OXYGEN SATURATION: 95 % | RESPIRATION RATE: 20 BRPM | WEIGHT: 315 LBS | DIASTOLIC BLOOD PRESSURE: 93 MMHG | HEIGHT: 74 IN | BODY MASS INDEX: 40.43 KG/M2

## 2018-07-10 DIAGNOSIS — I25.10 CORONARY ARTERY DISEASE INVOLVING NATIVE CORONARY ARTERY OF NATIVE HEART WITHOUT ANGINA PECTORIS: ICD-10-CM

## 2018-07-10 DIAGNOSIS — I10 ESSENTIAL HYPERTENSION, MALIGNANT: ICD-10-CM

## 2018-07-10 DIAGNOSIS — E78.00 HYPERCHOLESTEROLEMIA: ICD-10-CM

## 2018-07-10 DIAGNOSIS — Z00.00 ROUTINE GENERAL MEDICAL EXAMINATION AT A HEALTH CARE FACILITY: Primary | ICD-10-CM

## 2018-07-10 DIAGNOSIS — E11.8 CONTROLLED TYPE 2 DIABETES MELLITUS WITH COMPLICATION, WITHOUT LONG-TERM CURRENT USE OF INSULIN (HCC): ICD-10-CM

## 2018-07-10 DIAGNOSIS — N20.0 RENAL CALCULI: ICD-10-CM

## 2018-07-10 DIAGNOSIS — M19.90 ARTHRITIS: ICD-10-CM

## 2018-07-10 DIAGNOSIS — H60.549 ECZEMA OF EXTERNAL EAR, UNSPECIFIED LATERALITY: ICD-10-CM

## 2018-07-10 LAB — HBA1C MFR BLD HPLC: 9.2 %

## 2018-07-10 RX ORDER — NAPROXEN 375 MG/1
375 TABLET ORAL 2 TIMES DAILY WITH MEALS
Qty: 180 TAB | Refills: 4 | Status: SHIPPED | OUTPATIENT
Start: 2018-07-10 | End: 2019-10-16 | Stop reason: SDUPTHER

## 2018-07-10 RX ORDER — HYDROCHLOROTHIAZIDE 12.5 MG/1
12.5 TABLET ORAL DAILY
Qty: 90 TAB | Refills: 4 | Status: SHIPPED | OUTPATIENT
Start: 2018-07-10 | End: 2019-10-16 | Stop reason: SDUPTHER

## 2018-07-10 RX ORDER — PEN NEEDLE, DIABETIC 31 GX3/16"
NEEDLE, DISPOSABLE MISCELLANEOUS
Qty: 200 PEN NEEDLE | Refills: 12 | Status: SHIPPED | OUTPATIENT
Start: 2018-07-10 | End: 2019-10-16 | Stop reason: SDUPTHER

## 2018-07-10 RX ORDER — AMLODIPINE BESYLATE 5 MG/1
5 TABLET ORAL DAILY
Qty: 90 TAB | Refills: 4 | Status: SHIPPED | OUTPATIENT
Start: 2018-07-10 | End: 2019-10-16 | Stop reason: SDUPTHER

## 2018-07-10 RX ORDER — METFORMIN HYDROCHLORIDE 500 MG/1
500 TABLET ORAL
Qty: 90 TAB | Refills: 4 | Status: SHIPPED | OUTPATIENT
Start: 2018-07-10 | End: 2018-07-10

## 2018-07-10 RX ORDER — HYDROCORTISONE AND ACETIC ACID 20.75; 10.375 MG/ML; MG/ML
2 SOLUTION AURICULAR (OTIC) 2 TIMES DAILY
Qty: 10 ML | Refills: 12 | Status: SHIPPED | OUTPATIENT
Start: 2018-07-10 | End: 2019-03-07

## 2018-07-10 RX ORDER — MUPIROCIN 20 MG/G
OINTMENT TOPICAL DAILY
Qty: 22 G | Refills: 12 | Status: SHIPPED | OUTPATIENT
Start: 2018-07-10 | End: 2019-10-16 | Stop reason: SDUPTHER

## 2018-07-10 NOTE — MR AVS SNAPSHOT
[address] [place] [address] [phone] [id number] 
[phone] Patient: James Pardo MRN: [id number] :1960 Visit Information Date & Time Provider Department Dept. Phone Encounter #  
 7/10/2018 12:30 PM Annalisa Chapman, [address] [place] [address] [phone] [id number] Follow-up Instructions Return in about 6 months (around 1/10/2019) for EOV, HGAIC next visit. Follow-up and Disposition History Upcoming Health Maintenance Date Due HEMOGLOBIN A1C Q6M 2018 MICROALBUMIN Q1 2018 LIPID PANEL Q1 2018 Influenza Age 5 to Adult 2018 FOOT EXAM Q1 2018 EYE EXAM RETINAL OR DILATED Q1 3/6/2019 COLONOSCOPY 2022 DTaP/Tdap/Td series (2 - Td) 2027 Allergies as of 7/10/2018  Review Complete On: 7/10/2018 By: Annalisa Chapman, DO No Known Allergies Current Immunizations  Reviewed on 2017 Name Date Influenza Vaccine 2013  8:35 AM  
 Influenza Vaccine Split 2011 TD Vaccine 2008 Tdap 2017  8:24 AM  
 ZZZ-RETIRED (DO NOT USE) Pneumococcal Vaccine (Unspecified Type) 2011 Not reviewed this visit You Were Diagnosed With   
  
 Codes Comments Routine general medical examination at a health care facility    -  Primary ICD-10-CM: Z00.00 ICD-9-CM: V70.0 Essential hypertension, malignant     ICD-10-CM: I10 
ICD-9-CM: 401.0 Controlled type 2 diabetes mellitus with complication, without long-term current use of insulin (HCC)     ICD-10-CM: E11.8 ICD-9-CM: 250.90 Hypercholesterolemia     ICD-10-CM: E78.00 ICD-9-CM: 272.0 Coronary artery disease involving native coronary artery of native heart without angina pectoris     ICD-10-CM: I25.10 ICD-9-CM: 414.01 Eczema of external ear, unspecified laterality     ICD-10-CM: H60.549 ICD-9-CM: 380.22 Renal calculi     ICD-10-CM: N20.0 ICD-9-CM: 592.0 Arthritis     ICD-10-CM: M19.90 ICD-9-CM: 716.90 Vitals BP Pulse Temp Resp Height(growth percentile) Weight(growth percentile) (!) 146/93 (BP 1 Location: Right arm, BP Patient Position: Sitting) 83 98.4 °F (36.9 °C) (Oral) 20 6' 2\" (1.88 m) 349 lb 3.2 oz (158.4 kg) SpO2 BMI Smoking Status 95% 44.83 kg/m2 Former Smoker Vitals History BMI and BSA Data Body Mass Index Body Surface Area 44.83 kg/m 2 2.88 m 2 Preferred Pharmacy Pharmacy Name Phone [facility] - [address], [address] [phone] Your Updated Medication List  
  
   
This list is accurate as of 7/10/18  1:16 PM.  Always use your most recent med list. amLODIPine 5 mg tablet Commonly known as:  [place] Reynaldo Take 1 Tab by mouth daily. aspirin 81 mg chewable tablet Take 81 mg by mouth daily. hydroCHLOROthiazide 12.5 mg tablet Commonly known as:  HYDRODIURIL Take 1 Tab by mouth daily. hydrocortisone-acetic acid otic solution Commonly known as:  VOSOL-HC Administer 2 Drops into each ear two (2) times a day. Insulin Needles (Disposable) 32 gauge x 5/32\" Ndle Commonly known as:  PEN NEEDLE Use with victoza Liraglutide 0.6 mg/0.1 mL (18 mg/3 mL) Pnij Commonly known as:  VICTOZA 3-TAQUERIA  
0.6 mg by SubCUTAneous route daily. Increase weekly until dose is at 1.8mg/daily  
  
 mupirocin 2 % ointment Commonly known as:  [facility] Apply  to affected area daily. naproxen 375 mg tablet Commonly known as:  NAPROSYN Take 1 Tab by mouth two (2) times daily (with meals). Prescriptions Sent to Pharmacy Refills  
 amLODIPine (NORVASC) 5 mg tablet 4 Sig: Take 1 Tab by mouth daily. Class: Normal  
 Pharmacy: [address] Ph #: [phone] Route: Oral  
 hydroCHLOROthiazide (HYDRODIURIL) 12.5 mg tablet 4 Sig: Take 1 Tab by mouth daily.   
 Class: Normal  
 Pharmacy: [address] [place], [place] [address]samy DAVALOS, [facility] Medication Review Ph #: [phone] Route: Oral  
 naproxen (NAPROSYN) 375 mg tablet 4 Sig: Take 1 Tab by mouth two (2) times daily (with meals). Class: Normal  
 Pharmacy: [address] [place] [address], [facility] Medication Review Ph #: [phone] Route: Oral  
 hydrocortisone-acetic acid (VOSOL-HC) otic solution 12 Sig: Administer 2 Drops into each ear two (2) times a day. Class: Normal  
 Pharmacy: [address] [place] [address], [facility] Medication Review Ph #: [phone] Route: Both Ears Liraglutide (VICTOZA 3-TAQUERIA) 0.6 mg/0.1 mL (18 mg/3 mL) pnij 12 Si.6 mg by SubCUTAneous route daily. Increase weekly until dose is at 1.8mg/daily Class: Normal  
 Pharmacy: [address] [place] [address], [facility] Ph #: [phone] Route: SubCUTAneous Insulin Needles, Disposable, (PEN NEEDLE) 32 gauge x 5/32\" ndle 12 Sig: Use with victoza Class: Normal  
 Pharmacy: [address] [place] [address] Ph #: [phone]  
 mupirocin (BACTROBAN) 2 % ointment 12 Sig: Apply  to affected area daily. Class: Normal  
 Pharmacy: [address] [place], [place] [address] , [facility] Ph #: [phone] Route: Topical  
  
We Performed the Following AMB POC EKG ROUTINE / 12 LEADS, INTER & REP [38547 CPT(R)] AMB POC HEMOGLOBIN A1C [95798 CPT(R)] Follow-up Instructions Return in about 6 months (around 1/10/2019) for EOV, HGAIC next visit. To-Do List   
 07/10/2018 Lab:  CBC WITH AUTOMATED DIFF   
  
 07/10/2018 Lab:  LIPID PANEL   
  
 07/10/2018 Lab:  METABOLIC PANEL, COMPREHENSIVE   
  
 07/10/2018 Lab:  PSA, DIAGNOSTIC (PROSTATE SPECIFIC AG)   
  
 07/10/2018 Lab:  TSH 3RD GENERATION   
  
 07/10/2018 Lab:  URINALYSIS W/ RFLX MICROSCOPIC Patient Instructions Stop metformin Start victoza 
0.6mg/d for 7 days, Then 1.2 mg/d for 7 day, Then 1.8mg/d F/u in 1 mo with dr Tu Quiñones Introducing [facility] & HEALTH SERVICES! Chepe Palencia introduces [employer]ER[employer] assistant patient portal. Now you can access parts of your medical record, email your doctor's office, and request medication refills online. 1. In your internet browser, go to [email or web address] 2. Click on the First Time User? Click Here link in the Sign In box. You will see the New Member Sign Up page. 3. Enter your SHERPA assistant Access Code exactly as it appears below. You will not need to use this code after youve completed the sign-up process. If you do not sign up before the expiration date, you must request a new code. · SHER[employer] assistant Access Code: [email or web address] Expires: 10/8/2018 12:21 PM 
 
4. Enter the last four digits of your Social Security Number (xxxx) and Date of Birth (mm/dd/yyyy) as indicated and click Submit. You will be taken to the next sign-up page. 5. Create a SHERPA assistant ID. This will be your SHERPA assistant login ID and cannot be changed, so think of one that is secure and easy to remember. 6. Create a SHERPA assistant password. You can change your password at any time. 7. Enter your Password Reset Question and Answer. This can be used at a later time if you forget your password. 8. Enter your e-mail address. You will receive e-mail notification when new information is available in [address] 9. Click Sign Up. You can now view and download portions of your medical record. 10. Click the Download Summary menu link to download a portable copy of your medical information. If you have questions, please visit the Frequently Asked Questions section of the SHER[employer] assistant website. Remember, [employer]ER[employer] assistant is NOT to be used for urgent needs. For medical emergencies, dial 911. Now available from your iPhone and Android! Please provide this summary of care documentation to your next provider. Your primary care clinician is listed as [id number] [place] [address].  If you have any questions after today's visit, please call [phone].

## 2018-07-10 NOTE — PROGRESS NOTES
Room #  5    SUBJECTIVE:    Anabela Way is a 65 y.o. male who presents today for complete physical with EKG and A1C    1. Have you been to the ER, urgent care clinic since your last visit? Hospitalized since your last visit? NO    2. Have you seen or consulted any other health care providers outside of the [facility] since your last visit? Include any pap smears or colon screening. NO  When :  Reason:    Health Maintenance reviewed  Yes    Health Maintenance Due   Topic Date Due    HEMOGLOBIN A1C Q6M  06/21/2018    MICROALBUMIN Q1  07/27/2018    LIPID PANEL Q1  07/27/2018
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
## 2018-07-10 NOTE — PATIENT INSTRUCTIONS
Stop metformin  Start victoza  0.6mg/d for 7 days,   Then 1.2 mg/d for 7 day,   Then 1.8mg/d  F/u in 1 mo with dr Suanne Galeazzi

## 2018-08-09 ENCOUNTER — OFFICE VISIT (OUTPATIENT)
Dept: FAMILY MEDICINE CLINIC | Age: 58
End: 2018-08-09

## 2018-08-09 VITALS
DIASTOLIC BLOOD PRESSURE: 69 MMHG | WEIGHT: 315 LBS | BODY MASS INDEX: 40.43 KG/M2 | SYSTOLIC BLOOD PRESSURE: 153 MMHG | RESPIRATION RATE: 20 BRPM | TEMPERATURE: 98.5 F | OXYGEN SATURATION: 93 % | HEART RATE: 95 BPM | HEIGHT: 74 IN

## 2018-08-09 DIAGNOSIS — E78.00 HYPERCHOLESTEROLEMIA: ICD-10-CM

## 2018-08-09 DIAGNOSIS — I10 ESSENTIAL HYPERTENSION, MALIGNANT: ICD-10-CM

## 2018-08-09 DIAGNOSIS — E11.638 TYPE 2 DIABETES MELLITUS WITH OTHER ORAL COMPLICATION, WITHOUT LONG-TERM CURRENT USE OF INSULIN (HCC): Primary | ICD-10-CM

## 2018-08-09 LAB — HBA1C MFR BLD HPLC: 8.6 %
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
## 2018-08-09 NOTE — MR AVS SNAPSHOT
[facility] 
 
 
 [address] [facility] [address] [place] [id number] 
[phone] Patient: Dutch Archer MRN: [id number] :1960 Visit Information Date & Time Provider Department Dept. Phone Encounter #  
 2018  3:00  [facility] [phone] [id number] Follow-up Instructions Return in about 3 months (around 2018) for EOV, HGAIC next visit. Follow-up and Disposition History Your Appointments 2019  2:00 PM  
ROUTINE CARE with Eileen Prabhakar.,  [address] [address]) Appt Note: Return in about 6 months (around 1/10/2019) for Ledell Boas next visit. [id number] [facility] [address] [facility] 83 [address] [facility]  
  
   
 [address] [facility] [address] [facility] Upcoming Health Maintenance Date Due MICROALBUMIN Q1 2018 LIPID PANEL Q1 2018 Influenza Age 5 to Adult 2018 FOOT EXAM Q1 2018 HEMOGLOBIN A1C Q6M 1/10/2019 EYE EXAM RETINAL OR DILATED Q1 3/6/2019 COLONOSCOPY 2022 DTaP/Tdap/Td series (2 - Td) 2027 Allergies as of 2018  Review Complete On: 2018 By: Eileen Prabhakar., DO No Known Allergies Current Immunizations  Reviewed on 2017 Name Date Influenza Vaccine 2013  8:35 AM  
 Influenza Vaccine Split 2011 TD Vaccine 2008 Tdap 2017  8:24 AM  
 ZZZ-RETIRED (DO NOT USE) Pneumococcal Vaccine (Unspecified Type) 2011 Not reviewed this visit You Were Diagnosed With   
  
 Codes Comments Type 2 diabetes mellitus with other oral complication, without long-term current use of insulin (HCC)    -  Primary ICD-10-CM: E11.638 ICD-9-CM: 250.80 Essential hypertension, malignant     ICD-10-CM: I10 
ICD-9-CM: 401.0 Hypercholesterolemia     ICD-10-CM: E78.00 ICD-9-CM: 272.0 Vitals BP Pulse Temp Resp Height(growth percentile) Weight(growth percentile) 153/69 (BP 1 Location: Right arm, BP Patient Position: Sitting) 95 98.5 °F (36.9 °C) (Oral) 20 6' 2\" (1.88 m) 349 lb 3.2 oz (158.4 kg) SpO2 BMI Smoking Status 93% 44.83 kg/m2 Former Smoker Vitals History BMI and BSA Data Body Mass Index Body Surface Area 44.83 kg/m 2 2.88 m 2 Preferred Pharmacy Pharmacy Name Phone [facility]. . [address] [phone] Your Updated Medication List  
  
   
This list is accurate as of 8/9/18  3:04 PM.  Always use your most recent med list. amLODIPine 5 mg tablet Commonly known as:  [place] [facility] Take 1 Tab by mouth daily. aspirin 81 mg chewable tablet Take 81 mg by mouth daily. hydroCHLOROthiazide 12.5 mg tablet Commonly known as:  HYDRODIURIL Take 1 Tab by mouth daily. hydrocortisone-acetic acid otic solution Commonly known as:  VOSOL-HC Administer 2 Drops into each ear two (2) times a day. Insulin Needles (Disposable) 32 gauge x 5/32\" Ndle Commonly known as:  PEN NEEDLE Use with victoza Liraglutide 0.6 mg/0.1 mL (18 mg/3 mL) Pnij Commonly known as:  VICTOZA 3-TAQUERIA  
0.6 mg by SubCUTAneous route daily. Increase weekly until dose is at 1.8mg/daily  
  
 mupirocin 2 % ointment Commonly known as:  [facility] Apply  to affected area daily. naproxen 375 mg tablet Commonly known as:  NAPROSYN Take 1 Tab by mouth two (2) times daily (with meals). We Performed the Following AMB POC HEMOGLOBIN A1C [25576 CPT(R)] Follow-up Instructions Return in about 3 months (around 11/9/2018) for EOV, HGAIC next visit. Introducing [facility] HEALTH [facility]! [facility] introduces [employer] patient portal. Now you can access parts of your medical record, email your doctor's office, and request medication refills online.    
 
1. In your internet browser, go to [email or web address] 2. Click on the First Time User? Click Here link in the Sign In box. You will see the New Member Sign Up page. 3. Enter your [employer] Access Code exactly as it appears below. You will not need to use this code after youve completed the sign-up process. If you do not sign up before the expiration date, you must request a new code. · [employer] Access Code: [email or web address] Expires: 10/8/2018 12:21 PM 
 
4. Enter the last four digits of your Social Security Number (xxxx) and Date of Birth (mm/dd/yyyy) as indicated and click Submit. You will be taken to the next sign-up page. 5. Create a [employer] ID. This will be your [employer]Sight login ID and cannot be changed, so think of one that is secure and easy to remember. 6. Create a [employer] password. You can change your password at any time. 7. Enter your Password Reset Question and Answer. This can be used at a later time if you forget your password. 8. Enter your e-mail address. You will receive e-mail notification when new information is available in [address] 9. Click Sign Up. You can now view and download portions of your medical record. 10. Click the Download Summary menu link to download a portable copy of your medical information. If you have questions, please visit the Frequently Asked Questions section of the [employer] website. Remember, [employer]ight is NOT to be used for urgent needs. For medical emergencies, dial 911. Now available from your iPhone and Android! Please provide this summary of care documentation to your next provider. Your primary care clinician is listed as [id number] [facility] [address]. If you have any questions after today's visit, please call [phone].

## 2018-08-09 NOTE — PROGRESS NOTES
Room #  4    SUBJECTIVE:    Tim Meng is a 63 y.o. male who presents today for follow up for diabetes    1. Have you been to the ER, urgent care clinic since your last visit? Hospitalized since your last visit? NO    2. Have you seen or consulted any other health care providers outside of the [address] [place] [address] since your last visit? Include any pap smears or colon screening. NO  When :  Reason:    Health Maintenance reviewed Yes    Health Maintenance Due   Topic Date Due    MICROALBUMIN Q1  07/27/2018    LIPID PANEL Q1  07/27/2018    Influenza Age 5 to Adult  08/01/2018

## 2018-11-07 ENCOUNTER — OFFICE VISIT (OUTPATIENT)
Dept: FAMILY MEDICINE CLINIC | Age: 58
End: 2018-11-07

## 2018-11-07 VITALS
BODY MASS INDEX: 40.43 KG/M2 | HEART RATE: 86 BPM | TEMPERATURE: 98.2 F | WEIGHT: 315 LBS | SYSTOLIC BLOOD PRESSURE: 133 MMHG | DIASTOLIC BLOOD PRESSURE: 83 MMHG | RESPIRATION RATE: 16 BRPM | HEIGHT: 74 IN | OXYGEN SATURATION: 93 %

## 2018-11-07 DIAGNOSIS — I10 ESSENTIAL HYPERTENSION, MALIGNANT: ICD-10-CM

## 2018-11-07 DIAGNOSIS — M19.90 ARTHRITIS: ICD-10-CM

## 2018-11-07 DIAGNOSIS — E78.00 HYPERCHOLESTEROLEMIA: ICD-10-CM

## 2018-11-07 DIAGNOSIS — E11.9 CONTROLLED TYPE 2 DIABETES MELLITUS WITHOUT COMPLICATION, WITHOUT LONG-TERM CURRENT USE OF INSULIN (HCC): Primary | ICD-10-CM

## 2018-11-07 DIAGNOSIS — R22.33 NODULE OF FINGER OF BOTH HANDS: ICD-10-CM

## 2018-11-07 LAB — HBA1C MFR BLD HPLC: 7.5 %
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
## 2018-11-07 NOTE — PROGRESS NOTES
Lesli Camp is a 63 y.o. male presents today for follow up on his hypertension and diabetes. Patient reports of no current pain. Pt is in Room # 4        1. Have you been to the ER, urgent care clinic since your last visit? Hospitalized since your last visit? No    2. Have you seen or consulted any other health care providers outside of the [facility] since your last visit? Include any pap smears or colon screening. No     Health Maintenance reviewed - .

## 2018-12-14 ENCOUNTER — ANESTHESIA EVENT (OUTPATIENT)
Dept: ENDOSCOPY | Age: 58
End: 2018-12-14
Payer: COMMERCIAL

## 2018-12-17 ENCOUNTER — HOSPITAL ENCOUNTER (OUTPATIENT)
Age: 58
Setting detail: OUTPATIENT SURGERY
Discharge: HOME | End: 2018-12-17
Attending: INTERNAL MEDICINE | Admitting: INTERNAL MEDICINE
Payer: COMMERCIAL

## 2018-12-17 ENCOUNTER — ANESTHESIA (OUTPATIENT)
Dept: ENDOSCOPY | Age: 58
End: 2018-12-17
Payer: COMMERCIAL

## 2018-12-17 VITALS
SYSTOLIC BLOOD PRESSURE: 147 MMHG | DIASTOLIC BLOOD PRESSURE: 90 MMHG | WEIGHT: 315 LBS | OXYGEN SATURATION: 93 % | BODY MASS INDEX: 40.43 KG/M2 | HEIGHT: 74 IN | RESPIRATION RATE: 20 BRPM | TEMPERATURE: 97.8 F | HEART RATE: 81 BPM

## 2018-12-17 PROBLEM — Z86.010 ENCOUNTER FOR COLONOSCOPY DUE TO HISTORY OF ADENOMATOUS COLONIC POLYPS: Status: ACTIVE | Noted: 2018-12-17

## 2018-12-17 PROBLEM — Z12.11 ENCOUNTER FOR COLONOSCOPY DUE TO HISTORY OF ADENOMATOUS COLONIC POLYPS: Status: ACTIVE | Noted: 2018-12-17

## 2018-12-17 LAB
BUN BLD-MCNC: 15 MG/DL (ref 7–18)
CHLORIDE BLD-SCNC: 100 MMOL/L (ref 100–108)
GLUCOSE BLD STRIP.AUTO-MCNC: 112 MG/DL (ref 74–106)
HCT VFR BLD CALC: 48 % (ref 36–49)
HGB BLD-MCNC: 16.3 G/DL (ref 12–16)
POTASSIUM BLD-SCNC: 4.8 MMOL/L (ref 3.5–5.5)
SODIUM BLD-SCNC: 141 MMOL/L (ref 136–145)

## 2018-12-17 PROCEDURE — 76060000032 HC ANESTHESIA 0.5 TO 1 HR: Performed by: INTERNAL MEDICINE

## 2018-12-17 PROCEDURE — 77030009426 HC FCPS BIOP ENDOSC BSC -B: Performed by: INTERNAL MEDICINE

## 2018-12-17 PROCEDURE — 77030038604 HC SNR ENDO EXACTO USEN -B: Performed by: INTERNAL MEDICINE

## 2018-12-17 PROCEDURE — 76040000007: Performed by: INTERNAL MEDICINE

## 2018-12-17 PROCEDURE — 84295 ASSAY OF SERUM SODIUM: CPT

## 2018-12-17 PROCEDURE — 88305 TISSUE EXAM BY PATHOLOGIST: CPT

## 2018-12-17 PROCEDURE — 74011250636 HC RX REV CODE- 250/636: Performed by: NURSE ANESTHETIST, CERTIFIED REGISTERED

## 2018-12-17 PROCEDURE — 74011250636 HC RX REV CODE- 250/636

## 2018-12-17 PROCEDURE — 77030031670 HC DEV INFL ENDOTEK BIG60 MRTM -B: Performed by: INTERNAL MEDICINE

## 2018-12-17 RX ORDER — BISMUTH SUBSALICYLATE 262 MG
1 TABLET,CHEWABLE ORAL DAILY
COMMUNITY
End: 2019-03-07

## 2018-12-17 RX ORDER — LIDOCAINE HYDROCHLORIDE 20 MG/ML
INJECTION, SOLUTION EPIDURAL; INFILTRATION; INTRACAUDAL; PERINEURAL AS NEEDED
Status: DISCONTINUED | OUTPATIENT
Start: 2018-12-17 | End: 2018-12-17 | Stop reason: HOSPADM

## 2018-12-17 RX ORDER — PROPOFOL 10 MG/ML
INJECTION, EMULSION INTRAVENOUS AS NEEDED
Status: DISCONTINUED | OUTPATIENT
Start: 2018-12-17 | End: 2018-12-17 | Stop reason: HOSPADM

## 2018-12-17 RX ORDER — SODIUM CHLORIDE, SODIUM LACTATE, POTASSIUM CHLORIDE, CALCIUM CHLORIDE 600; 310; 30; 20 MG/100ML; MG/100ML; MG/100ML; MG/100ML
25 INJECTION, SOLUTION INTRAVENOUS CONTINUOUS
Status: CANCELLED | OUTPATIENT
Start: 2018-12-17

## 2018-12-17 RX ORDER — INSULIN LISPRO 100 [IU]/ML
INJECTION, SOLUTION INTRAVENOUS; SUBCUTANEOUS ONCE
Status: DISCONTINUED | OUTPATIENT
Start: 2018-12-17 | End: 2018-12-17 | Stop reason: HOSPADM

## 2018-12-17 RX ORDER — FAMOTIDINE 20 MG/1
20 TABLET, FILM COATED ORAL ONCE
Status: DISCONTINUED | OUTPATIENT
Start: 2018-12-17 | End: 2018-12-17 | Stop reason: HOSPADM

## 2018-12-17 RX ORDER — ONDANSETRON 2 MG/ML
4 INJECTION INTRAMUSCULAR; INTRAVENOUS ONCE
Status: CANCELLED | OUTPATIENT
Start: 2018-12-17 | End: 2018-12-17

## 2018-12-17 RX ORDER — DEXTROSE MONOHYDRATE 25 G/50ML
25-50 INJECTION, SOLUTION INTRAVENOUS AS NEEDED
Status: DISCONTINUED | OUTPATIENT
Start: 2018-12-17 | End: 2018-12-17 | Stop reason: HOSPADM

## 2018-12-17 RX ORDER — GLUCOSAMINE SULFATE 1500 MG
1000 POWDER IN PACKET (EA) ORAL DAILY
COMMUNITY
End: 2019-03-07

## 2018-12-17 RX ORDER — MAGNESIUM SULFATE 100 %
4 CRYSTALS MISCELLANEOUS AS NEEDED
Status: DISCONTINUED | OUTPATIENT
Start: 2018-12-17 | End: 2018-12-17 | Stop reason: HOSPADM

## 2018-12-17 RX ORDER — DEXTROMETHORPHAN/PSEUDOEPHED 2.5-7.5/.8
1.2 DROPS ORAL
Status: CANCELLED | OUTPATIENT
Start: 2018-12-17

## 2018-12-17 RX ORDER — SODIUM CHLORIDE, SODIUM LACTATE, POTASSIUM CHLORIDE, CALCIUM CHLORIDE 600; 310; 30; 20 MG/100ML; MG/100ML; MG/100ML; MG/100ML
50 INJECTION, SOLUTION INTRAVENOUS CONTINUOUS
Status: DISCONTINUED | OUTPATIENT
Start: 2018-12-17 | End: 2018-12-17 | Stop reason: HOSPADM

## 2018-12-17 RX ORDER — SODIUM CHLORIDE 0.9 % (FLUSH) 0.9 %
5-10 SYRINGE (ML) INJECTION AS NEEDED
Status: DISCONTINUED | OUTPATIENT
Start: 2018-12-17 | End: 2018-12-17 | Stop reason: HOSPADM

## 2018-12-17 RX ORDER — SODIUM CHLORIDE 0.9 % (FLUSH) 0.9 %
5-10 SYRINGE (ML) INJECTION EVERY 8 HOURS
Status: DISCONTINUED | OUTPATIENT
Start: 2018-12-17 | End: 2018-12-17 | Stop reason: HOSPADM

## 2018-12-17 RX ORDER — SODIUM CHLORIDE 9 MG/ML
25 INJECTION, SOLUTION INTRAVENOUS CONTINUOUS
Status: CANCELLED | OUTPATIENT
Start: 2018-12-17 | End: 2018-12-17

## 2018-12-17 RX ORDER — LIDOCAINE HYDROCHLORIDE 10 MG/ML
0.1 INJECTION, SOLUTION EPIDURAL; INFILTRATION; INTRACAUDAL; PERINEURAL AS NEEDED
Status: DISCONTINUED | OUTPATIENT
Start: 2018-12-17 | End: 2018-12-17 | Stop reason: HOSPADM

## 2018-12-17 RX ADMIN — PROPOFOL 50 MG: 10 INJECTION, EMULSION INTRAVENOUS at 09:37

## 2018-12-17 RX ADMIN — LIDOCAINE HYDROCHLORIDE 50 MG: 20 INJECTION, SOLUTION EPIDURAL; INFILTRATION; INTRACAUDAL; PERINEURAL at 09:09

## 2018-12-17 RX ADMIN — PROPOFOL 50 MG: 10 INJECTION, EMULSION INTRAVENOUS at 09:25

## 2018-12-17 RX ADMIN — PROPOFOL 50 MG: 10 INJECTION, EMULSION INTRAVENOUS at 09:10

## 2018-12-17 RX ADMIN — PROPOFOL 50 MG: 10 INJECTION, EMULSION INTRAVENOUS at 09:09

## 2018-12-17 RX ADMIN — PROPOFOL 50 MG: 10 INJECTION, EMULSION INTRAVENOUS at 09:11

## 2018-12-17 RX ADMIN — PROPOFOL 50 MG: 10 INJECTION, EMULSION INTRAVENOUS at 09:40

## 2018-12-17 RX ADMIN — PROPOFOL 50 MG: 10 INJECTION, EMULSION INTRAVENOUS at 09:12

## 2018-12-17 RX ADMIN — SODIUM CHLORIDE, SODIUM LACTATE, POTASSIUM CHLORIDE, AND CALCIUM CHLORIDE: 600; 310; 30; 20 INJECTION, SOLUTION INTRAVENOUS at 09:06

## 2018-12-17 RX ADMIN — PROPOFOL 40 MG: 10 INJECTION, EMULSION INTRAVENOUS at 09:47

## 2018-12-17 RX ADMIN — PROPOFOL 50 MG: 10 INJECTION, EMULSION INTRAVENOUS at 09:30

## 2018-12-17 RX ADMIN — PROPOFOL 50 MG: 10 INJECTION, EMULSION INTRAVENOUS at 09:16

## 2018-12-17 RX ADMIN — PROPOFOL 40 MG: 10 INJECTION, EMULSION INTRAVENOUS at 09:51

## 2018-12-17 NOTE — PROCEDURES
Colonoscopy Report    Patient: Yuliana Morton. MRN: [id number]  SSN: [id number]    YOB: 1960  Age: 63 y.o. Sex: male      Date of Procedure: 12/17/2018    IMPRESSION:  1.. The prep was excellent and the terminal ileal orfice was normal  2.  5 cm pedunculated lipoma in the left colon not disturbed  3.  3  Mm polyp in the proximal right colon removed with cold biops  4.  2 polyps removed with cold snare---4-5 mm in the hepatic flexure and mid-right colon  5.  technically difficult colon with extra long colon, looping and requiring extrinsic pressure by 2 nurses to reach the cecum    RECOMMENDATIONS:  1. Call me in 2-3 weeks for polyp biopsy results if not received beforehand. If adenoma or hyperplastic, then repeat colon in 3 years b/o high risk personal and family history and technically diffcult exam    Indication:  Personal history of adenomas and sister with colon cancer---3 year colonoscopy  History: The history and physical exam were reviewed and updated. Procedure Performed: Colonoscopy biopsy, polypectomy (cold snare)  Endoscopist: Rosa Akbar MD  Assistant: Endoscopy Technician-1: Jr Mayo  Endoscopy RN-1: Angela Walker RN   ASA: ASA 2 - Patient with mild systemic disease with no functional limitations  Mallampati Score: II (soft palate, uvula, fauces visible)  Sedation:  MAC anesthesia  Endoscope: [id number]  Extent of Examination:terminal ileum  Quality of Preparation: excellent    Technique: The procedure was discussed with the patient including risks, benefits, alternatives including risks of IV sedation, bleeding, perforation and missed polyp. A safety timeout was performed. The patient was placed in left lateral position. The patient was given incremental doses of IV medication to achieve moderate  sedation. The patients vital signs were monitored at all times including heart rate, rhythm, blood pressure and oxygen saturation.   When adequate sedation was achieved a perianal inspection and a digital rectal exam were performed. The video colonoscope was introduced into the rectum and advanced under direct vision up to the cecum and into the terminal ileal orfice. The cecum was identified by IC valve, appendiceal orifice and convergence of the tineal folds. Careful examination of the colonic mucosa was then performed on slow withdrawal of the endoscope. Retroflexion was performed in the rectum and the distal rectum visualized as was the anal canal.  The patient tolerated the procedure very well and was transferred to recovery area. Findings:  See impression above  EBL: minimal  Specimen:   ID Type Source Tests Collected by Time Destination   1 : (cold snare) Polyp Preservative Hepatic Flexure  Shayne Arias MD 12/17/2018 4392 Pathology   2 : (cold snare and bx) Distal Right colon polyps x 2 Preservative Colon  Shayne Arias MD 12/17/2018 6464 Pathology   3 : (cold snare) Proximal Right colon Polyp Preservative Colon  Shayne Arias MD 12/17/2018 7468 Pathology       Jovanni Cano MD, Me[facility] Sal[facility]  December 17, 2018  9:57 AM

## 2018-12-17 NOTE — DISCHARGE INSTRUCTIONS
RECOMMENDATIONS:  1. Call me in 2-3 weeks for polyp biopsy results if not received beforehand. If adenoma or hyperplastic, then repeat colon in 3 years b/o high risk personal and family history and technically diffcult exam      DISCHARGE SUMMARY from Nurse    PATIENT INSTRUCTIONS:    After general anesthesia or intravenous sedation, for 24 hours or while taking prescription Narcotics:  · Limit your activities  · Do not drive and operate hazardous machinery  · Do not make important personal or business decisions  · Do  not drink alcoholic beverages  · If you have not urinated within 8 hours after discharge, please contact your surgeon on call. Report the following to your surgeon:  · Excessive pain, swelling, redness or odor of or around the surgical area  · Temperature over 100.5  · Nausea and vomiting lasting longer than 4 hours or if unable to take medications  · Any signs of decreased circulation or nerve impairment to extremity: change in color, persistent  numbness, tingling, coldness or increase pain  · Any questions    What to do at Home:  These are general instructions for a healthy lifestyle:    No smoking/ No tobacco products/ Avoid exposure to second hand smoke  Surgeon General's Warning:  Quitting smoking now greatly reduces serious risk to your health. Obesity, smoking, and sedentary lifestyle greatly increases your risk for illness    A healthy diet, regular physical exercise & weight monitoring are important for maintaining a healthy lifestyle    You may be retaining fluid if you have a history of heart failure or if you experience any of the following symptoms:  Weight gain of 3 pounds or more overnight or 5 pounds in a week, increased swelling in our hands or feet or shortness of breath while lying flat in bed. Please call your doctor as soon as you notice any of these symptoms; do not wait until your next office visit.     Recognize signs and symptoms of STROKE:    F-face looks uneven    A-arms unable to move or move unevenly    S-speech slurred or non-existent    T-time-call 911 as soon as signs and symptoms begin-DO NOT go       Back to bed or wait to see if you get better-TIME IS BRAIN. Warning Signs of HEART ATTACK     Call 911 if you have these symptoms:   Chest discomfort. Most heart attacks involve discomfort in the center of the chest that lasts more than a few minutes, or that goes away and comes back. It can feel like uncomfortable pressure, squeezing, fullness, or pain.  Discomfort in other areas of the upper body. Symptoms can include pain or discomfort in one or both arms, the back, neck, jaw, or stomach.  Shortness of breath with or without chest discomfort.  Other signs may include breaking out in a cold sweat, nausea, or lightheadedness. Don't wait more than five minutes to call 911 - MINUTES MATTER! Fast action can save your life. Calling 911 is almost always the fastest way to get lifesaving treatment. Emergency Medical Services staff can begin treatment when they arrive -- up to an hour sooner than if someone gets to the hospital by car. The discharge information has been reviewed with the patient. The patient verbalized understanding. Discharge medications reviewed with the patient and appropriate educational materials and side effects teaching were provided. ___________________________________________________________________________________________________________________________________    Patient armband removed and given to patient to take home.   Patient was informed of the privacy risks if armband lost or stolen,

## 2018-12-17 NOTE — H&P
Reason for Appointment   1. Family history of colon cancer   2. History of colon polyps     History of Present Illness   encounter:   Mr. Graham Levin is a 63 male who is with a family history of colon cancer and a personal history of colon polyps. His last examination was in  to which polyps were removed. It was recommended that he return in five years however it has only been three years. The patient feels more comfortable with a 3 year follow-up interval because of his strong family history of colon cancer. Both his mother and father had colon cancer and his sister also had colon cancer. His sister had been checked for Mccullough syndrome, and apparently genetic testing was negative. The patient will provide me a copy of his sister's genetic testing as I have requested. Has no GI complaints at this time. Current Medications   Taking    hydrochlorothiazide 25 mg tablet 1 tab(s) orally once a day    multivitamin Multiple Vitamins tablet 1 tab(s) orally once a day    Victoza 18 mg/3 mL solution subcutaneously once a day    amlodipine 5 mg tablet 1 tab(s) orally once a day    naproxen 500 mg delayed release tablet 1 tab(s) orally 2 times a day    Vitamin D 1000 IU cap 1 cap po qd    Discontinued    PEG-3350 with Electolytes - powder for reconstitution 4000 mL orally as directed    metformin 500 mg tablet, extended release 1 tab(s) orally once a day    Medication List reviewed and reconciled with the patient      Past Medical History   Diabetic nephropathy. Type 2 diabetes. Hypertension. Vitamin D deficiency. Surgical History   Colonoscopy 2012     Family History   Father: alive 76 yrs, Diagnosed with colon cancer at age 76, diagnosed with Cancer   Mother:  61 yrs, at age 61 from colon cancer   Sister(s): 2 sisters alive and well. One sister diagnosed with colorectal cancer at age 40   Children: 1 son alive and well   2 sister(s) .     No known first degree family history of inflammatory bowel disease. No family history of pancreatitis or pancreatic cancer. Social History   Marital Status: . 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 Smoking   Tobacco use: former smoker  no Alcohol. no IV Drug use. no Drugs (Illicit). no Blood transfusions. Allergies   N.K.D.A. Review of Systems   Complete review of systems was taken and reviewed with the patient on 11/6/18 and will be scanned into the medical record. Positives will be noted in HPI. Negatives will not be listed here. Vital Signs   BMI 42.99, HR 80, /113, Wt 344, Ht 6'3\", RR 16.     Physical Examination   General: Pleasant, healthy-appearing male in no obvious distress. Neck: Supple. No thyromegaly or mass palpable. Nodes: No supraclavicular, axillary, cervical nodes palpable. Chest: Clear to auscultation. Symmetric breath sounds. Good air movement. Heart: First and second heart sounds are normal. No murmurs, rubs, gallops. Abdomen: Soft, nontender. No organomegaly or mass palpable. Bowel sounds are normal. There are no bruits. Extremities: No pedal edema. Assessments     1. History of colon polyps - Z86.010 (Primary), Adenomatous polyps on colonoscopy in 2015. The patient feels more comfortable with a 3 year follow-up interval exam rather than 5 years because of very strong family history in both parents and sister who had colon cancer. The patient's sister apparently was in her mid 29's at the time of diagnosis. 2. Type 2 diabetes mellitus without complications - C93.9     3. Essential (primary) hypertension - I10     4. Family history of colon cancer - Z80.0, sister in [place] [id number] at diagnosis, The patient believes that the sister had genetic testing since she has worked for [employer] in the past. He believes that the genetic testing was negative. He promises to get me a copy of that genetic testing on a sister for my review and for inclusion in his medical record here at our office. Treatment   1. History of colon polyps   Stop Reglan for prep tab, 10 mg, 1 tab, orally, 1 hr before prep, 2 days, 2 tabs  Start Suprep Bowel Prep Kit liquid, 1.6 g-3.13 g-17.5 g/177 mL, 177 mL, orally, twice, 2 dose(s), 1 kit, Refills 0  Start Phenergan (Colon prep) tab, 25 mg, 1 tablet, PO, take 30-60 min before prep, 2 days, 2 tablets, Refills 0  Start Citrate of Magnesia liquid, 1.745 g/30 mL, 300 mL, orally, as directed, 3 days, 3 bottles, Refills 0  IMAGING: Colonoscopy with MAC (Monitored Anesthesia Care) (Ordered for 12/17/2018)  Notes: 1. Colonoscopy is being scheduled December 17 at 9 a.m. at [facility]. Low residue diet for 1 week prior to procedure. The patient requests Suprep as opposed to the large volume PEG prep. He will be given citrate of magnesia, one bottle 2-3 days prior to starting standard colonoscopy prep because a relative constipation at baseline. 2. Maximum 3-5 year surveillance colonoscopy interval because of very strong family history of colon cancer in both parents at a young sister, age 36 at the time of diagnosis. 2. Type 2 diabetes mellitus without complications   Notes: 1. Standard changes and diabetic medication per protocol in order to optimize safety of colonoscopy prep and procedure. 3. Essential (primary) hypertension   Notes: 1. The patient has been instructed to take his usual blood pressure medications on the day before and the day of colonoscopy so as to optimize blood pressure control and improve the safety of sedation. 4. Family history of colon cancer   Notes: 1. the patient is to speak to his sister about the genetic testing that she has had for her colon cancer. Possible DRAKE SINDROME, FAMILIAL POLYPOSIS and get me a copy of the results for my review.            No significant interval change in history or physical findings since last office visit

## 2018-12-17 NOTE — ANESTHESIA POSTPROCEDURE EVALUATION
Procedure(s):  COLONOSCOPY. Anesthesia Post Evaluation      Multimodal analgesia: multimodal analgesia not used between 6 hours prior to anesthesia start to PACU discharge  Patient location: [facility].   Level of consciousness: awake and alert  Pain score: 0  Airway patency: patent  Anesthetic complications: no  Cardiovascular status: stable  Respiratory status: room air  Hydration status: stable  Post anesthesia nausea and vomiting:  none      Visit Vitals  /90   Pulse 81   Temp 36.6 °C (97.8 °F)   Resp 20   Ht 6' 2\" (1.88 m)   Wt 154.2 kg (340 lb)   SpO2 93%   BMI 43.65 kg/m²

## 2018-12-17 NOTE — ANESTHESIA PREPROCEDURE EVALUATION
Anesthetic History   No history of anesthetic complications            Review of Systems / Medical History  Patient summary reviewed and pertinent labs reviewed    Pulmonary        Sleep apnea  Undiagnosed apnea         Neuro/Psych   Within defined limits           Cardiovascular  Within defined limits  Hypertension              Exercise tolerance: >4 METS     GI/Hepatic/Renal  Within defined limits              Endo/Other  Within defined limits  Diabetes: type 2    Morbid obesity     Other Findings              Physical Exam    Airway  Mallampati: II  TM Distance: 4 - 6 cm  Neck ROM: normal range of motion   Mouth opening: Normal     Cardiovascular  Regular rate and rhythm,  S1 and S2 normal,  no murmur, click, rub, or gallop  Rhythm: regular  Rate: normal         Dental    Dentition: Lower dentition intact and Upper dentition intact     Pulmonary  Breath sounds clear to auscultation               Abdominal  GI exam deferred       Other Findings            Anesthetic Plan    ASA: 3  Anesthesia type: MAC          Induction: Intravenous  Anesthetic plan and risks discussed with: Patient

## 2018-12-17 NOTE — PERIOP NOTES
[facility] PT oriented to room and POC  Dr. Jean Ruvalcaba by bedside. Mauro Crocker, by bedside. PT Discharge to home in private vehic le driven by wife.

## 2019-02-06 ENCOUNTER — TELEPHONE (OUTPATIENT)
Dept: FAMILY MEDICINE CLINIC | Age: 59
End: 2019-02-06

## 2019-02-06 ENCOUNTER — OFFICE VISIT (OUTPATIENT)
Dept: FAMILY MEDICINE CLINIC | Age: 59
End: 2019-02-06

## 2019-02-06 VITALS
DIASTOLIC BLOOD PRESSURE: 96 MMHG | RESPIRATION RATE: 18 BRPM | OXYGEN SATURATION: 94 % | WEIGHT: 315 LBS | HEART RATE: 88 BPM | BODY MASS INDEX: 40.43 KG/M2 | TEMPERATURE: 97.5 F | SYSTOLIC BLOOD PRESSURE: 163 MMHG | HEIGHT: 74 IN

## 2019-02-06 DIAGNOSIS — E11.8 CONTROLLED TYPE 2 DIABETES MELLITUS WITH COMPLICATION, WITHOUT LONG-TERM CURRENT USE OF INSULIN (HCC): ICD-10-CM

## 2019-02-06 DIAGNOSIS — Z12.2 ENCOUNTER FOR SCREENING FOR LUNG CANCER: Primary | ICD-10-CM

## 2019-02-06 LAB — HBA1C MFR BLD HPLC: 8.3 %

## 2019-02-06 RX ORDER — METFORMIN HYDROCHLORIDE 500 MG/1
500 TABLET ORAL 2 TIMES DAILY WITH MEALS
Qty: 180 TAB | Refills: 4 | Status: SHIPPED | OUTPATIENT
Start: 2019-02-06 | End: 2020-01-22 | Stop reason: SDUPTHER
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
## 2019-02-06 NOTE — TELEPHONE ENCOUNTER
Received call from [facility]. Tobi 3 spoke to [facility] for medication verification for patient for [address]. Box contains 3 pens 3x 3 ml = 9 ml, 27 ml = 30 days. Consulted with provider and was given the okay for medication to be 27 ml 4 refills for patient for the year.

## 2019-02-06 NOTE — PROGRESS NOTES
1. Have you been to the ER, urgent care clinic since your last visit? Hospitalized since your last visit? No    2. Have you seen or consulted any other health care providers outside of the [address] [place] [address] since your last visit? Include any pap smears or colon screening.  No

## 2019-03-07 ENCOUNTER — OFFICE VISIT (OUTPATIENT)
Dept: FAMILY MEDICINE CLINIC | Age: 59
End: 2019-03-07

## 2019-03-07 VITALS
WEIGHT: 315 LBS | HEART RATE: 88 BPM | HEIGHT: 74 IN | SYSTOLIC BLOOD PRESSURE: 140 MMHG | OXYGEN SATURATION: 96 % | TEMPERATURE: 98.6 F | RESPIRATION RATE: 20 BRPM | DIASTOLIC BLOOD PRESSURE: 84 MMHG | BODY MASS INDEX: 40.43 KG/M2

## 2019-03-07 DIAGNOSIS — E11.9 CONTROLLED TYPE 2 DIABETES MELLITUS WITHOUT COMPLICATION, WITHOUT LONG-TERM CURRENT USE OF INSULIN (HCC): Primary | ICD-10-CM

## 2019-03-07 DIAGNOSIS — H60.543 ECZEMA OF BOTH EXTERNAL EARS: ICD-10-CM

## 2019-03-07 PROBLEM — Z12.11 ENCOUNTER FOR COLONOSCOPY DUE TO HISTORY OF ADENOMATOUS COLONIC POLYPS: Status: RESOLVED | Noted: 2018-12-17 | Resolved: 2019-03-07

## 2019-03-07 PROBLEM — N20.0 KIDNEY STONES: Status: RESOLVED | Noted: 2017-07-27 | Resolved: 2019-03-07

## 2019-03-07 PROBLEM — Z86.010 ENCOUNTER FOR COLONOSCOPY DUE TO HISTORY OF ADENOMATOUS COLONIC POLYPS: Status: RESOLVED | Noted: 2018-12-17 | Resolved: 2019-03-07

## 2019-03-07 LAB — HBA1C MFR BLD HPLC: 7.9 %

## 2019-03-07 RX ORDER — MOMETASONE FUROATE 1 MG/ML
SOLUTION TOPICAL
Qty: 30 ML | Refills: 12 | Status: SHIPPED | OUTPATIENT
Start: 2019-03-07 | End: 2022-03-17 | Stop reason: SDUPTHER
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
## 2019-03-07 NOTE — PROGRESS NOTES
Room #      SUBJECTIVE:    Yohan Sanchez is a 63 y.o. male who presents today for follow up diabetes    1. Have you been to the ER, urgent care clinic since your last visit? Hospitalized since your last visit? NO    2. Have you seen or consulted any other health care providers outside of the [address] [place] [address] since your last visit? Include any pap smears or colon screening. NO  When :  Reason:    Health Maintenance reviewed Yes    Health Maintenance Due   Topic Date Due    Shingrix Vaccine Age 49> (1 of 2) 07/21/2011    MICROALBUMIN Q1  07/27/2018    LIPID PANEL Q1  07/27/2018    Influenza Age 5 to Adult  08/01/2018    FOOT EXAM Q1  12/21/2018

## 2019-03-08 ENCOUNTER — TELEPHONE (OUTPATIENT)
Dept: FAMILY MEDICINE CLINIC | Age: 59
End: 2019-03-08

## 2019-03-08 NOTE — TELEPHONE ENCOUNTER
Received call from Bethel Mead from the [address] stating that they do not have the prescribed medication Mometasone 0.1% lotion but that they have the generic Mometasone 0.1% solution. Request for a medication change for this please advise. Called placed to [facility] spoke to Bethel Mead to let them know that medication change is ok with Dr. Kelly Lemons.  No other concerns at this time

## 2019-06-05 ENCOUNTER — OFFICE VISIT (OUTPATIENT)
Dept: FAMILY MEDICINE CLINIC | Age: 59
End: 2019-06-05

## 2019-06-05 VITALS
DIASTOLIC BLOOD PRESSURE: 85 MMHG | OXYGEN SATURATION: 92 % | TEMPERATURE: 97.8 F | HEART RATE: 82 BPM | WEIGHT: 315 LBS | SYSTOLIC BLOOD PRESSURE: 138 MMHG | BODY MASS INDEX: 40.43 KG/M2 | HEIGHT: 74 IN | RESPIRATION RATE: 20 BRPM

## 2019-06-05 DIAGNOSIS — E11.9 CONTROLLED TYPE 2 DIABETES MELLITUS WITHOUT COMPLICATION, WITHOUT LONG-TERM CURRENT USE OF INSULIN (HCC): Primary | ICD-10-CM

## 2019-06-05 DIAGNOSIS — R22.32 MASS OF FINGER OF LEFT HAND: ICD-10-CM

## 2019-06-05 NOTE — PROGRESS NOTES
Room #      SUBJECTIVE:    Alfreda Morejon is a 63 y.o. male who presents today for follow up for diabetes and hypertension    1. Have you been to the ER, urgent care clinic since your last visit? Hospitalized since your last visit? NO    2. Have you seen or consulted any other health care providers outside of the [address] [place] [address] since your last visit? Include any pap smears or colon screening. NO  When :  Reason:    Health Maintenance reviewed Yes    Health Maintenance Due   Topic Date Due    Shingrix Vaccine Age 49> (1 of 2) 07/21/2011    Pneumococcal 0-64 years (1 of 1 - PPSV23) 11/16/2011    MICROALBUMIN Q1  07/27/2018    LIPID PANEL Q1  07/27/2018    FOOT EXAM Q1  12/21/2018
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
## 2019-06-06 LAB — HBA1C MFR BLD HPLC: 7.7 % (ref 4.2–5.8)

## 2019-10-16 ENCOUNTER — HOSPITAL ENCOUNTER (OUTPATIENT)
Dept: LAB | Age: 59
Discharge: HOME | End: 2019-10-16
Payer: COMMERCIAL

## 2019-10-16 ENCOUNTER — OFFICE VISIT (OUTPATIENT)
Dept: FAMILY MEDICINE CLINIC | Age: 59
End: 2019-10-16

## 2019-10-16 VITALS
BODY MASS INDEX: 40.43 KG/M2 | DIASTOLIC BLOOD PRESSURE: 100 MMHG | OXYGEN SATURATION: 98 % | SYSTOLIC BLOOD PRESSURE: 130 MMHG | TEMPERATURE: 98.2 F | HEART RATE: 80 BPM | WEIGHT: 315 LBS | RESPIRATION RATE: 18 BRPM | HEIGHT: 74 IN

## 2019-10-16 DIAGNOSIS — E78.00 HYPERCHOLESTEROLEMIA: ICD-10-CM

## 2019-10-16 DIAGNOSIS — E11.65 TYPE 2 DIABETES MELLITUS WITH HYPERGLYCEMIA, WITHOUT LONG-TERM CURRENT USE OF INSULIN (HCC): ICD-10-CM

## 2019-10-16 DIAGNOSIS — M19.90 ARTHRITIS: ICD-10-CM

## 2019-10-16 DIAGNOSIS — I10 ESSENTIAL HYPERTENSION, MALIGNANT: ICD-10-CM

## 2019-10-16 DIAGNOSIS — N20.0 RENAL CALCULI: ICD-10-CM

## 2019-10-16 DIAGNOSIS — Z00.00 ROUTINE GENERAL MEDICAL EXAMINATION AT A HEALTH CARE FACILITY: Primary | ICD-10-CM

## 2019-10-16 DIAGNOSIS — H60.549 ECZEMA OF EXTERNAL EAR, UNSPECIFIED LATERALITY: ICD-10-CM

## 2019-10-16 LAB
CHOLEST SERPL-MCNC: 208 MG/DL
CREAT UR-MCNC: 144 MG/DL (ref 30–125)
HDLC SERPL-MCNC: 41 MG/DL (ref 40–60)
HDLC SERPL: 5.1 {RATIO} (ref 0–5)
LDLC SERPL CALC-MCNC: 133.8 MG/DL (ref 0–100)
LIPID PROFILE,FLP: ABNORMAL
MICROALBUMIN UR-MCNC: 1.17 MG/DL (ref 0–3)
MICROALBUMIN/CREAT UR-RTO: 8 MG/G (ref 0–30)
TRIGL SERPL-MCNC: 166 MG/DL (ref ?–150)
VLDLC SERPL CALC-MCNC: 33.2 MG/DL

## 2019-10-16 PROCEDURE — 80061 LIPID PANEL: CPT

## 2019-10-16 PROCEDURE — 36415 COLL VENOUS BLD VENIPUNCTURE: CPT

## 2019-10-16 PROCEDURE — 82043 UR ALBUMIN QUANTITATIVE: CPT

## 2019-10-16 RX ORDER — AMLODIPINE BESYLATE 5 MG/1
5 TABLET ORAL DAILY
Qty: 90 TAB | Refills: 4 | Status: SHIPPED | OUTPATIENT
Start: 2019-10-16 | End: 2021-01-14

## 2019-10-16 RX ORDER — HYDROCHLOROTHIAZIDE 25 MG/1
25 TABLET ORAL DAILY
Qty: 90 TAB | Refills: 4 | Status: SHIPPED | OUTPATIENT
Start: 2019-10-16 | End: 2020-05-20 | Stop reason: SDUPTHER

## 2019-10-16 RX ORDER — NAPROXEN 375 MG/1
375 TABLET ORAL 2 TIMES DAILY WITH MEALS
Qty: 180 TAB | Refills: 4 | Status: SHIPPED | OUTPATIENT
Start: 2019-10-16 | End: 2020-05-01

## 2019-10-16 RX ORDER — MUPIROCIN 20 MG/G
OINTMENT TOPICAL DAILY
Qty: 22 G | Refills: 12 | Status: SHIPPED | OUTPATIENT
Start: 2019-10-16 | End: 2022-07-08

## 2019-10-16 RX ORDER — PEN NEEDLE, DIABETIC 31 GX3/16"
NEEDLE, DISPOSABLE MISCELLANEOUS
Qty: 200 PEN NEEDLE | Refills: 12 | Status: SHIPPED | OUTPATIENT
Start: 2019-10-16 | End: 2021-02-02

## 2019-10-16 RX ORDER — HYDROCORTISONE AND ACETIC ACID 20.75; 10.375 MG/ML; MG/ML
2 SOLUTION AURICULAR (OTIC) 2 TIMES DAILY
Qty: 10 ML | Refills: 12 | Status: SHIPPED | OUTPATIENT
Start: 2019-10-16 | End: 2020-05-19 | Stop reason: CLARIF

## 2019-10-16 NOTE — PROGRESS NOTES
Joy Pickett is a 63 y.o.  male and presents with    Chief Complaint   Patient presents with    Annual Exam     Subjective: Well Adult Physical   Patient here for a comprehensive physical exam.The patient reports problems - HLD, CAD, DM type 2,   Do you take any herbs or supplements that were not prescribed by a doctor? no Are you taking calcium supplements? no Are you taking aspirin daily? no  He is exercising 3 times a week; he feels like he is unable to lose his gut. Cardiovascular Review:  The patient has diabetes, hypertension, hyperlipidemia, coronary artery disease and obesity. Diet and Lifestyle: generally follows a low fat low cholesterol diet, generally follows a low sodium diet, follows a diabetic diet regularly, exercises regularly, nonsmoker  Home BP Monitoring: is well controlled at home, ranging 130's/85's. Pertinent ROS: taking medications as instructed, no medication side effects noted, no TIA's, no chest pain on exertion, no dyspnea on exertion, no swelling of ankles. Diabetes Mellitus:  He has diabetes mellitus. Diabetic ROS - medication compliance: compliant all of the time, diabetic diet compliance: compliant most of the time, home glucose monitoring: is performed regularly. Lab review: labs reviewed, I note that glycosylated hemoglobin mildly abnormal but acceptable.      ROS   General ROS: negative for - chills, fatigue or fever  Psychological ROS: negative for - anxiety or depression  Ophthalmic ROS: positive for - uses glasses  ENT ROS: negative for - headaches, nasal congestion or sore throat  Endocrine ROS: negative for - polydipsia/polyuria or temperature intolerance  Respiratory ROS: no cough, shortness of breath, or wheezing  Cardiovascular ROS: no chest pain or dyspnea on exertion  Gastrointestinal ROS: no abdominal pain, change in bowel habits, or black or bloody stools  Genito-Urinary ROS: no dysuria, trouble voiding, or hematuria  Musculoskeletal ROS: negative for - joint pain or muscle pain  Neurological ROS: negative for - numbness/tingling or weakness  Dermatological ROS: negative for - rash or skin lesion changes    All other systems reviewed and are negative. Objective:  Vitals:    10/16/19 0953   BP: (!) 130/100   Pulse: 80   Resp: 18   Temp: 98.2 °F (36.8 °C)   TempSrc: Oral   SpO2: 98%   Weight: 326 lb 12.8 oz (148.2 kg)   Height: 6' 2\" (1.88 m)   PainSc:   0 - No pain       BMI 41.96 kg/m²       General appearance  alert, cooperative, no distress, appears stated age   Head  Normocephalic, without obvious abnormality, atraumatic   Eyes  conjunctivae/corneas clear. PERRL, EOM's intact. Fundi benign   Ears  normal TM's and external ear canals AU   Nose Nares normal. Septum midline. Mucosa normal. No drainage or sinus tenderness. Throat Lips, mucosa, and tongue normal. Teeth and gums normal   Neck supple, symmetrical, trachea midline, no adenopathy, thyroid: not enlarged, symmetric, no tenderness/mass/nodules, no carotid bruit and no JVD   Back   symmetric, no curvature. ROM normal. No CVA tenderness   Lungs   clear to auscultation bilaterally   Chest wall  no tenderness   Heart  regular rate and rhythm, S1, S2 normal, no murmur, click, rub or gallop   Abdomen   soft, non-tender. Bowel sounds normal. No masses,  No organomegaly   Genitalia  deferred   Rectal  deferred   Extremities extremities normal, atraumatic, no cyanosis; 1+ edema   Pulses 2+ and symmetric   Skin Skin color, texture, turgor normal. No rashes or lesions; healing laceration on right lower leg   Lymph nodes Cervical, supraclavicular, and axillary nodes normal.   Neurologic Normal     Diabetic foot exam:     Left Foot:   Visual Exam: normal    Pulse DP: 2+ (normal)   Filament test: reduced sensation        Right Foot:   Visual Exam: normal    Pulse DP: 2+ (normal)   Filament test: reduced sensation      LABS   hgb a1c 7.7    Assessment/Plan:    1.  Hypercholesterolemia  Declined statin therapy; desires to work through diet and exercise    2. Eczema of external ear, unspecified laterality  Topical treatment  - hydrocortisone-acetic acid (VOSOL-HC) otic solution; Administer 2 Drops into each ear two (2) times a day. Dispense: 10 mL; Refill: 12    3. Routine general medical examination at a health care facility  Reviewed preventive recommendations    4. Renal calculi  No symptoms    5. Essential hypertension, malignant  Goal <130/80  - amLODIPine (NORVASC) 5 mg tablet; Take 1 Tab by mouth daily. Dispense: 90 Tab; Refill: 4  - hydroCHLOROthiazide (HYDRODIURIL) 25 mg tablet; Take 1 Tab by mouth daily. Dispense: 90 Tab; Refill: 4    6. Arthritis    - naproxen (NAPROSYN) 375 mg tablet; Take 1 Tab by mouth two (2) times daily (with meals). Dispense: 180 Tab; Refill: 4    7. Type 2 diabetes mellitus with hyperglycemia, without long-term current use of insulin ([facility])  Goal hgb a1c <7  - LIPID PANEL; Future  - MICROALBUMIN, UR, RAND W/ MICROALB/CREAT RATIO; Future  - HM DIABETES FOOT EXAM  - Insulin Needles, Disposable, (PEN NEEDLE) 32 gauge x 5/32\" ndle; Use with victoza  Dispense: 200 Pen Needle; Refill: 12      Lab review: labs reviewed, I note that glycosylated hemoglobin mildly abnormal but acceptable, orders written for new lab studies as appropriate; see orders      I have discussed the diagnosis with the patient and the intended plan as seen in the above orders. The patient has received an after-visit summary and questions were answered concerning future plans. I have discussed medication side effects and warnings with the patient as well. I have reviewed the plan of care with the patient, accepted their input and they are in agreement with the treatment goals.

## 2019-10-16 NOTE — PROGRESS NOTES
1. Have you been to the ER, urgent care clinic since your last visit? Hospitalized since your last visit? No    2. Have you seen or consulted any other health care providers outside of the [address] [place] [address] since your last visit? Include any pap smears or colon screening.  No

## 2020-01-22 ENCOUNTER — HOSPITAL ENCOUNTER (OUTPATIENT)
Dept: LAB | Age: 60
Discharge: HOME | End: 2020-01-22
Payer: COMMERCIAL

## 2020-01-22 ENCOUNTER — OFFICE VISIT (OUTPATIENT)
Dept: FAMILY MEDICINE CLINIC | Age: 60
End: 2020-01-22

## 2020-01-22 ENCOUNTER — TELEPHONE (OUTPATIENT)
Dept: FAMILY MEDICINE CLINIC | Age: 60
End: 2020-01-22

## 2020-01-22 VITALS
BODY MASS INDEX: 40.43 KG/M2 | DIASTOLIC BLOOD PRESSURE: 86 MMHG | HEIGHT: 74 IN | WEIGHT: 315 LBS | OXYGEN SATURATION: 95 % | RESPIRATION RATE: 16 BRPM | SYSTOLIC BLOOD PRESSURE: 138 MMHG | HEART RATE: 80 BPM | TEMPERATURE: 98 F

## 2020-01-22 DIAGNOSIS — S80.812A ABRASION OF LEFT LOWER EXTREMITY, INITIAL ENCOUNTER: ICD-10-CM

## 2020-01-22 DIAGNOSIS — L25.5 CONTACT DERMATITIS DUE TO PLANT: ICD-10-CM

## 2020-01-22 DIAGNOSIS — E11.8 CONTROLLED TYPE 2 DIABETES MELLITUS WITH COMPLICATION, WITHOUT LONG-TERM CURRENT USE OF INSULIN (HCC): ICD-10-CM

## 2020-01-22 DIAGNOSIS — E78.00 HYPERCHOLESTEROLEMIA: Primary | ICD-10-CM

## 2020-01-22 DIAGNOSIS — E66.01 MORBID OBESITY (HCC): ICD-10-CM

## 2020-01-22 DIAGNOSIS — B35.1 ONYCHOMYCOSIS: ICD-10-CM

## 2020-01-22 LAB
EST. AVERAGE GLUCOSE BLD GHB EST-MCNC: 226 MG/DL
HBA1C MFR BLD: 9.5 % (ref 4.2–5.6)

## 2020-01-22 PROCEDURE — 36415 COLL VENOUS BLD VENIPUNCTURE: CPT

## 2020-01-22 PROCEDURE — 83036 HEMOGLOBIN GLYCOSYLATED A1C: CPT

## 2020-01-22 RX ORDER — METFORMIN HYDROCHLORIDE 500 MG/1
500 TABLET ORAL 2 TIMES DAILY WITH MEALS
Qty: 180 TAB | Refills: 4 | Status: SHIPPED | OUTPATIENT
Start: 2020-01-22 | End: 2020-05-20 | Stop reason: SDUPTHER

## 2020-01-22 RX ORDER — TERBINAFINE HYDROCHLORIDE 250 MG/1
250 TABLET ORAL DAILY
Qty: 30 TAB | Refills: 2 | Status: SHIPPED | OUTPATIENT
Start: 2020-01-22 | End: 2020-05-04

## 2020-01-22 NOTE — PROGRESS NOTES
1. Have you been to the ER, urgent care clinic since your last visit? Hospitalized since your last visit? No    2. Have you seen or consulted any other health care providers outside of the [address] [place] [address] since your last visit? Include any pap smears or colon screening. No     Patient presents in office today for routine care. Patient concerns: lab results, left leg injury, and foot issue.

## 2020-01-22 NOTE — TELEPHONE ENCOUNTER
Prior Authorization has been completed and is processing at this time via [employer]. Awaiting response from insurance company.

## 2020-01-22 NOTE — TELEPHONE ENCOUNTER
[facility] from [facility] called in and informed me that back on the 9th she had sent a prior auth request to us and hadn't heard anything back. She stated that the patient came in and was upset with her because nothing had been done in regards to his victoza. [facility] asked for a call back. Please advise.

## 2020-01-22 NOTE — PROGRESS NOTES
Christo Magallon is a 63 y.o.  male and presents with    Chief Complaint   Patient presents with    Diabetes    Hypertension    Arthritis     Subjective:  Mr. Nandini Madrigal had exposure to poison ivy with rashes and weeping on arms and legs    He has used peroxide daily and covered abrasion with antibiotic ointment and elastic bandage  Patient suffered a fall on a boat 2 weeks ago injuring his L shin. He initially noted a bruise with an overlying abrasion. During the 1st week he noted that it would scab over, but then it would fall off in the shower. When it continued to not improve, this past week, he has been spraying it daily with peroxide and covering abrasion with mupirocin ointment and bandaid. He denies any drainage, swelling, or fevers.     He also has noticed thickening and yellowing of his toenails with some nails pulling away from his skin in November after dropping a cinderblock on his R foot. He notes this is similar to an episode of onychomyocosis he was treated for with 6mo course of lamisil 3 years ago. He denies any erythema, itchiness, or skin breakdown.     He also notes the week before Christmas he had poison ivy to both lower extremities and R upper arm. He used several over-the-counter products and it has resolved. Still has several scabs in the areas with mild itching, but states it is much improved.     He has diabetes. He takes his metformin as prescribed, but ran out of victoza last week. He has not been adhering to a diabetic diet. He checks his blood glucose regularly at home with AM blood glucose ranging 120-130, but today it was 238. He endorses intermittent numbness/tingling in b/l feet. Denies any changes in vision, abd pain, or N/V.     He has HTN and hyperlipidemia. Takes his HCTZ, amlodipine as prescribed. He has not been following a low-fat, low-cholesterol, low-sodium diet. He denies any headaches, edema, dyspnea on exertion.   ROS   Cardiovascular Review:  The patient has diabetes, hypertension, hyperlipidemia, coronary artery disease and obesity. Diet and Lifestyle: generally follows a low fat low cholesterol diet, generally follows a low sodium diet, follows a diabetic diet regularly, exercises regularly, nonsmoker  Home BP Monitoring: is well controlled at home, ranging 130's/85's. Pertinent ROS: taking medications as instructed, no medication side effects noted, no TIA's, no chest pain on exertion, no dyspnea on exertion, no swelling of ankles. Diabetes Mellitus:  He has diabetes mellitus. Diabetic ROS - medication compliance: compliant all of the time, diabetic diet compliance: compliant most of the time, home glucose monitoring: is performed regularly. Lab review: labs reviewed, I note that glycosylated hemoglobin mildly abnormal but acceptable.      ROS   General ROS: negative for - chills, fatigue or fever  Psychological ROS: negative for - anxiety or depression  Ophthalmic ROS: positive for - uses glasses  ENT ROS: negative for - headaches, nasal congestion or sore throat  Endocrine ROS: negative for - polydipsia/polyuria or temperature intolerance  Respiratory ROS: no cough, shortness of breath, or wheezing  Cardiovascular ROS: no chest pain or dyspnea on exertion  Gastrointestinal ROS: no abdominal pain, change in bowel habits, or black or bloody stools  Genito-Urinary ROS: no dysuria, trouble voiding, or hematuria  Musculoskeletal ROS: negative for - joint pain or muscle pain  Neurological ROS: negative for - numbness/tingling or weakness  Dermatological ROS: wound on left lower leg     All other systems reviewed and are negative. Objective:  Vitals:    01/22/20 0917   BP: 148/87   Pulse: 80   Resp: 16   Temp: 98 °F (36.7 °C)   TempSrc: Oral   SpO2: 95%   Weight: 350 lb (158.8 kg)   Height: 6' 2\" (1.88 m)   PainSc:   0 - No pain     A&Ox3. Well-appearing, obese, in no acute distress. Cooperative, sitting comfortably in chair.   Mental status - normal mood, behavior, speech, dress, motor activity, and thought processes  Lungs - clear to auscultation, no wheezes, rales or rhonchi, symmetric air movement  Heart - normal rate, regular rhythm, normal S1, S2, no murmurs, rubs, or gallops  Abdomen - soft, nontender, obese  Extremities - 1+ pedal edema, symmetric regular distal pulses; R foot - 1st toenail with 1.5 cm area of black discoloration on proximal/medial aspect; L foot - 1st toenail with diffusely yellow with nail thickening, 2-4th toenails yellow discoloration pulling away from underlying skin  Neuro: b/l feet subjectively with reduced sensation to plantar surface with intact monofilament sensation in all fields  Skin - warm, dry, no rashes; 2cm abrasion with surrounding erythema on L shin; b/l shins with scattered scabs and dry skin; R lateral upper arm scattered scabs  Diabetic foot exam:     Left Foot:   Visual Exam: normal    Pulse DP: 2+ (normal)   Filament test: reduced sensation        Right Foot:   Visual Exam: normal    Pulse DP: 2+ (normal)   Filament test: reduced sensation      LABS   hgb a1c 9.5    Assessment/Plan:    1. Controlled type 2 diabetes mellitus with complication, without long-term current use of insulin ([facility])  Goal hgb a1c <7  - HM DIABETES FOOT EXAM  - HEMOGLOBIN A1C WITH EAG; Future  - liraglutide (VICTOZA 3-TAQUERIA) 0.6 mg/0.1 mL (18 mg/3 mL) pnij; 1.8 mg by SubCUTAneous route daily. Dispense: 12 Pen; Refill: 12  - metFORMIN (GLUCOPHAGE) 500 mg tablet; Take 1 Tab by mouth two (2) times daily (with meals). Dispense: 180 Tab; Refill: 4    2. Hypercholesterolemia  Continue statin    3. Morbid obesity ([address]  I have reviewed/discussed the above normal BMI with the patient. I have recommended the following interventions: dietary management education, guidance, and counseling and encourage exercise . .      4. Onychomycosis    - terbinafine HCl (LAMISIL) 250 mg tablet; Take 1 Tab by mouth daily. Dispense: 30 Tab;  Refill: 2    5. Contact dermatitis due to plant  Complete course of prednisone    6. Abrasion of left lower extremity, initial encounter  Keep clean and dry      Lab review: orders written for new lab studies as appropriate; see orders      I have discussed the diagnosis with the patient and the intended plan as seen in the above orders. The patient has received an after-visit summary and questions were answered concerning future plans. I have discussed medication side effects and warnings with the patient as well. I have reviewed the plan of care with the patient, accepted their input and they are in agreement with the treatment goals.

## 2020-01-22 NOTE — PROGRESS NOTES
*ATTENTION:  This note has been created by a medical student for educational purposes only. Please do not refer to the content of this note for clinical decision-making, billing, or other purposes. Please see attending physicians note to obtain clinical information on this patient. *     Subjective  Mr. Alexsandra Scott is a 62yo  male presenting today for f/u for lab results and c/o poor healing leg wound and toe nail concern. Patient suffered a fall on a boat 2 weeks ago injuring his L shin. He initially noted a bruise with an overlying abrasion. During the 1st week he noted that it would scab over, but then it would fall off in the shower. When it continued to not improve, this past week, he has been spraying it daily with peroxide and covering abrasion with mupirocin ointment and bandaid. He denies any drainage, swelling, or fevers. He also has noticed thickening and yellowing of his toenails with some nails pulling away from his skin in November after dropping a cinderblock on his R foot. He notes this is similar to an episode of onychomyocosis he was treated for with 6mo course of lamisil 3 years ago. He denies any erythema, itchiness, or skin breakdown. He also notes the week before Christmas he had poison ivy to both lower extremities and R upper arm. He used several over-the-counter products and it has resolved. Still has several scabs in the areas with mild itching, but states it is much improved. He has diabetes. He takes his metformin as prescribed, but ran out of victoza last week. He has not been adhering to a diabetic diet. He checks his blood glucose regularly at home with AM blood glucose ranging 120-130, but today it was 238. He endorses intermittent numbness/tingling in b/l feet. Denies any changes in vision, abd pain, or N/V. He has HTN and hyperlipidemia. Takes his HCTZ, amlodipine as prescribed.   He has not been following a low-fat, low-cholesterol, low-sodium diet.  He denies any headaches, edema, dyspnea on exertion. ROS  General: denies chills, fatigue, fever  Psych: denies anxiety, depression  HEENT: confirms use of glasses; denies vision/hearing changes, headaches, nasal congestion, sore throat  Resp: denies cough, SOB  Card: denies chest pain, palpitations, dyspnea on exertion  GI: denies abd pain, change in bowel habits, black or bloody stools  : denies dysuria, trouble voiding, hematuria  MSK: denies any muscle or joint pain  Neuro: confirms tingling/numbness in b/l feet; denies weakness  Derm: denies rash    Objective  Visit Vitals  /86 (BP 1 Location: Right arm, BP Patient Position: Sitting)   Pulse 80   Temp 98 °F (36.7 °C) (Oral)   Resp 16   Ht 6' 2\" (1.88 m)   Wt 350 lb (158.8 kg)   SpO2 95%   BMI 44.94 kg/m²       A&Ox3. Well-appearing, obese, in no acute distress. Cooperative, sitting comfortably in chair.   Mental status - normal mood, behavior, speech, dress, motor activity, and thought processes  Lungs - clear to auscultation, no wheezes, rales or rhonchi, symmetric air movement  Heart - normal rate, regular rhythm, normal S1, S2, no murmurs, rubs, or gallops  Abdomen - soft, nontender, obese  Extremities - 1+ pedal edema, symmetric regular distal pulses; R foot - 1st toenail with 1.5 cm area of black discoloration on proximal/medial aspect; L foot - 1st toenail with diffusely yellow with nail thickening, 2-4th toenails yellow discoloration pulling away from underlying skin  Neuro: b/l feet subjectively with reduced sensation to plantar surface with intact monofilament sensation in all fields  Skin - warm, dry, no rashes; 2cm abrasion with surrounding erythema on L shin; b/l shins with scattered scabs and dry skin; R lateral upper arm scattered scabs    Assessment/Plan  1) Onychomycosis  -terbafine     2) Abrasion to L shin  -keep dry, uncovered  -stop daily peroxide    3) Hypercholesterolemia  -refusing statin therapy at this time  -educated on healthy diet/exercise  -f/u in 2 months    4) T2DM  -diabetic foot exam  -continue current therapy    5) HTN  -continue current therapy    6) Contact dermatitis 2/2 poison ivy  -resolved

## 2020-01-23 NOTE — TELEPHONE ENCOUNTER
Prior Authorization has been completed again and is processing at this time via fax. Awaiting response from insurance company ("[employer] [facility] [employer])     Fax number: [phone]. Fax confirmation was received and sent to central scanning.

## 2020-01-24 NOTE — TELEPHONE ENCOUNTER
Prior Authorization for the patient Torito has been completed and approved. Approval dates are from 1/22/2020 to 1/21/2021. Approval letter has been sent to central scanning. Encounter closed.

## 2020-03-05 ENCOUNTER — DOCUMENTATION ONLY (OUTPATIENT)
Dept: PHARMACY | Age: 60
End: 2020-03-05

## 2020-03-05 NOTE — PROGRESS NOTES
Received the following e-mail from patient:  Hi  I tried to use the [employer] renetta, it tells me it does not recognize my log in, so I tried to down load the paper application for Diabetes and when it prints it comes out with no data,  Can you forward me a paper application? Thanks  [facility], Contract Administration  [facility] [address]  [phone] (phone)  [phone]  (cell)      DM Program Application e-mailed to patient.

## 2020-03-05 NOTE — PROGRESS NOTES
Pharmacy Pop Care Documentation:   Patient has completed all the requirements by 3/15/20 and therefore will be enrolled in the DM Program on 4/01/20. Application received: 9/71/07  Application scanned. Letter and e-mail sent to patient.     [place], [employer]   Department, toll free: [phone], option 7

## 2020-03-05 NOTE — LETTER
[address] [facility] [address] Phone: toll free [phone] option 7 
 
 
 
Mr. Alfreda Morejon [address] [phone] Congratulations! You have completed the requirements to participate in the [facility] Diabetes Management program for 2020. What you will receive? You will receive beginning April 1st up to $600  in waived copays for specific medications and pharmacy-related supplies purchased through [address] [place] [address]. A list of these items is available on the [facility] Diabetes Management page (Employee Quick Links > Human Resources > Benefits and Well Being > Diabetes Management) and on the [employer] site under the Diabetes Management tile. In addition, clinical support will be available if your A1C becomes greater than 8 percent. To reduce your health risks, our care coordinators and diabetes educators will assist you in better controlling your condition. What you will need to do? To maintain your benefit throughout the year and be eligible to receive the benefit next year, you must make sure you complete all the following ongoing requirements: 
· Requirements to be completed throughout year: · Take diabetes medication as prescribed as well as cholesterol (Statin) or high blood pressure (ACE/ARB), if needed. ? 70% adherence is required of diabetes medications ? Provide documentation if cholesterol and/or high blood pressure medications are not needed. · Lipid panel (once yearly) · Urine albumin (once yearly) · Pneumonia Vaccination (as indicated) · Requirements to be completed between Jan. 1 and June 30: 
· Visit with your physician (First yearly visit) · Meet with a [address] [place] [address] pharmacist in person at a hospital location or by phone (This visit may be conducted prior to the start of the requirements period.) · First A1c 
 · Requirements to be completed between July 1 and Dec. 31 
· Visit with your physician (Second yearly visit) · Second A1c · Flu vaccination · Requirements if A1c is greater than 8 percent: 
· Engage with a [address] diabetes educator at one of our hospital locations or an ambulatory care coordinator by phone, if your A1c is greater than 8 percent. We will be reviewing your [employer] account and sending you reminders for needed actions. Please remember if you dont have a [address], you will need to submit documentation to [email or web address] or by fax to [phone]. As a reminder, if program requirements are not met by stated dead-lines, your benefit may be terminated and you will not be eligible to participate in the program until the following year. Thank you for participating in the program and taking steps to improve your health. [address] Phone: toll free [phone], option 7 Email: [email or web address] Fax Number: [phone]

## 2020-03-20 ENCOUNTER — TELEPHONE (OUTPATIENT)
Dept: PHARMACY | Age: 60
End: 2020-03-20

## 2020-03-20 NOTE — TELEPHONE ENCOUNTER
Called patient to schedule 2020 yearly pharmacist appointment to discuss medications for Diabetes Management Program.      Spoke to patient and he stated that he didn't have time to speak with anyone due to dealing with the Covid-19 outbreak. Sending a letter to remind him to call when time permits.      Sola Hernandez    Department, toll free: [phone], option 7

## 2020-03-20 NOTE — LETTER
[address] [facility] [address] Phone: toll free [phone] option 7 
 
 
 
Mr. Simi Ortiz [address] [place] [phone] Congratulations! You have successfully enrolled in the Be Well With Diabetes program for 2020. One of the requirements to participate in the Be Well With Diabetes Program is to complete a Clinical Pharmacist Telephone appointment yearly. The [address] [place] [address] Team has attempted to contact you to schedule your 2020 Diabetes Management telephone appointment but was unable to reach you. We would like to work with you and your doctor to: - Review your medications, including over-the-counter and herbal medications - Answer questions about your medications and how to get the most benefit from them - Identify potential drug interactions or side effects and help fix them - Identify preferred medications that are equally effective, but available at a lower cost to you 
- Help you reach the necessary requirements to remain enrolled in the Diabetes Management Program offered by [facility] Please call [phone] and select option #7 to schedule this appointment to take advantage of this service. This is a courtesy reminder. If you have this appointment already scheduled for your 2020 enrollment in the program, please disregard this message. If you have not scheduled this appointment yet, please contact us at the above number to schedule. Sincerely, [address] Phone: [phone], option 7

## 2020-04-17 ENCOUNTER — TELEPHONE (OUTPATIENT)
Dept: FAMILY MEDICINE CLINIC | Age: 60
End: 2020-04-17

## 2020-04-17 NOTE — TELEPHONE ENCOUNTER
Pt stated that he fell from his bed last night and it caused him to have swollen left leg and knot. Pt stated that he have been putting ice on it but it was not going down. Pt would like to know if Dr. Padmini Her can prescribe a new prescription and would like to get a call back. Pt was informed that new prescription usually require an appointment but will still send a message to the doctor. Pt explained that he is in so much pain and cannot wait any longer. Pt scheduled an appointment on 4/20/2020 at 4pm, but still need a medication for his bumps and swollen leg.

## 2020-04-23 ENCOUNTER — HOSPITAL ENCOUNTER (OUTPATIENT)
Dept: VASCULAR SURGERY | Age: 60
Discharge: HOME | End: 2020-04-23
Attending: FAMILY MEDICINE
Payer: COMMERCIAL

## 2020-04-23 ENCOUNTER — OFFICE VISIT (OUTPATIENT)
Dept: FAMILY MEDICINE CLINIC | Age: 60
End: 2020-04-23

## 2020-04-23 ENCOUNTER — TELEPHONE (OUTPATIENT)
Dept: PHARMACY | Age: 60
End: 2020-04-23

## 2020-04-23 VITALS
HEART RATE: 88 BPM | BODY MASS INDEX: 40.43 KG/M2 | DIASTOLIC BLOOD PRESSURE: 85 MMHG | HEIGHT: 74 IN | SYSTOLIC BLOOD PRESSURE: 131 MMHG | TEMPERATURE: 98.2 F | WEIGHT: 315 LBS | RESPIRATION RATE: 16 BRPM | OXYGEN SATURATION: 96 %

## 2020-04-23 DIAGNOSIS — L03.116 LEFT LEG CELLULITIS: Primary | ICD-10-CM

## 2020-04-23 DIAGNOSIS — M79.605 LEFT LEG PAIN: ICD-10-CM

## 2020-04-23 DIAGNOSIS — I73.9 PERIPHERAL VASCULAR DISEASE (HCC): ICD-10-CM

## 2020-04-23 DIAGNOSIS — E11.51 TYPE 2 DIABETES MELLITUS WITH PERIPHERAL VASCULAR DISEASE (HCC): ICD-10-CM

## 2020-04-23 DIAGNOSIS — E66.01 MORBID OBESITY (HCC): ICD-10-CM

## 2020-04-23 DIAGNOSIS — E78.00 HYPERCHOLESTEROLEMIA: ICD-10-CM

## 2020-04-23 DIAGNOSIS — M79.605 LEFT LEG PAIN: Primary | ICD-10-CM

## 2020-04-23 PROCEDURE — 93971 EXTREMITY STUDY: CPT

## 2020-04-23 RX ORDER — OXYCODONE AND ACETAMINOPHEN 5; 325 MG/1; MG/1
1 TABLET ORAL
Qty: 12 TAB | Refills: 0 | Status: SHIPPED | OUTPATIENT
Start: 2020-04-23 | End: 2020-04-26

## 2020-04-23 RX ORDER — CEPHALEXIN 500 MG/1
500 CAPSULE ORAL 4 TIMES DAILY
Qty: 40 CAP | Refills: 0 | Status: SHIPPED | OUTPATIENT
Start: 2020-04-23 | End: 2020-05-01

## 2020-04-23 NOTE — PROGRESS NOTES
Libertad Becerra presents today for   Chief Complaint   Patient presents with    Leg Pain       Is someone accompanying this pt? no    Is the patient using any DME equipment during OV? no    Depression Screening:  3 most recent PHQ Screens 8/9/2018   Little interest or pleasure in doing things Not at all   Feeling down, depressed, irritable, or hopeless Not at all   Total Score PHQ 2 0       Learning Assessment:  Learning Assessment 3/19/2014   PRIMARY LEARNER Patient   HIGHEST LEVEL OF EDUCATION - PRIMARY LEARNER  2 YEARS Kary PRIMARY LEARNER NONE   CO-LEARNER CAREGIVER No   PRIMARY LANGUAGE ENGLISH   LEARNER PREFERENCE PRIMARY READING     -     -   ANSWERED BY self   RELATIONSHIP SELF       Abuse Screening:  Abuse Screening Questionnaire 3/19/2014   Do you ever feel afraid of your partner? N   Are you in a relationship with someone who physically or mentally threatens you? N   Is it safe for you to go home? Y       Fall Risk  No flowsheet data found. Health Maintenance reviewed and discussed and ordered per Provider. Health Maintenance Due   Topic Date Due    Shingrix Vaccine Age 49> (1 of 2) 07/21/2011    Pneumococcal 0-64 years (1 of 1 - PPSV23) 11/16/2011    Eye Exam Retinal or Dilated  03/06/2020    A1C test (Diabetic or Prediabetic)  04/22/2020   . Coordination of Care:  1. Have you been to the ER, urgent care clinic since your last visit? Hospitalized since your last visit? no    2. Have you seen or consulted any other health care providers outside of the [address] [place] [address] since your last visit? Include any pap smears or colon screening. no      Last  Checked na  Last UDS Checked na  Last Pain contract signed: na    Patient presents in office today for routine care.   Patient concerns: swollen left leg w/pain

## 2020-04-23 NOTE — PROGRESS NOTES
Jacike Gibson is a 63 y.o.  male and presents with    Chief Complaint   Patient presents with    Leg Pain       Subjective:  He fell from his bed 1 week ago and he struck his left leg, and it caused him to have swollen left leg and knot. Pt states that he had been putting ice on it for the first day, and the pear-sized lump decreased in size. He initially was in so much pain at 10/10. His pain improved to 3-4/10 after the first day. He has blister-like bumps and swollen leg. the pain increased over the past few days with increased edema and redness. He also has a bruise on his left upper thigh. He was sent for left leg ultrasound. He has been working 7 days a week. He is not elevating his leg. He has diabetes. Teddy Skinner takes his metformin as prescribed, but ran out of [email or web address] last week. Teddy Skinner has not been adhering to a diabetic diet.  He checks his blood glucose regularly at home with AM blood glucose ranging 120-130.  He endorses intermittent numbness/tingling in b/l feet.  Denies any changes in vision, abd pain, or N/V.     He has HTN and hyperlipidemia.  Takes his HCTZ, amlodipine as prescribed. Teddy Skinner has not been following a low-fat, low-cholesterol, low-sodium diet.  He denies any headaches, edema, dyspnea on exertion. ROS   Cardiovascular Review:  The patient has diabetes, hypertension, hyperlipidemia, coronary artery disease and obesity. Diet and Lifestyle: generally follows a low fat low cholesterol diet, generally follows a low sodium diet, follows a diabetic diet regularly, exercises regularly, nonsmoker  Home BP Monitoring: is well controlled at home, ranging 130's/85's. Pertinent ROS: taking medications as instructed, no medication side effects noted, no TIA's, no chest pain on exertion, no dyspnea on exertion, no swelling of ankles.   Diabetes Mellitus:  He has diabetes mellitus.   Diabetic ROS - medication compliance: compliant all of the time, diabetic diet compliance: compliant most of the time, home glucose monitoring: is performed regularly. Lab review: labs reviewed, I note that glycosylated hemoglobin mildly abnormal but acceptable.      ROS   General ROS: negative for - chills, fatigue or fever  Psychological ROS: negative for - anxiety or depression  Ophthalmic ROS: positive for - uses glasses  ENT ROS: negative for - headaches, nasal congestion or sore throat  Endocrine ROS: negative for - polydipsia/polyuria or temperature intolerance  Respiratory ROS: no cough, shortness of breath, or wheezing  Cardiovascular ROS: no chest pain or dyspnea on exertion  Gastrointestinal ROS: no abdominal pain, change in bowel habits, or black or bloody stools  Genito-Urinary ROS: no dysuria, trouble voiding, or hematuria  Musculoskeletal ROS: negative for - joint pain or muscle pain  Neurological ROS: negative for - numbness/tingling or weakness     All other systems reviewed and are negative. Objective:  Vitals:    04/23/20 1600   BP: 131/85   Pulse: 88   Resp: 16   Temp: 98.2 °F (36.8 °C)   TempSrc: Oral   SpO2: 96%   Weight: (!) 353 lb (160.1 kg)   Height: 6' 2\" (1.88 m)   PainSc:   5   PainLoc: Generalized       alert, well appearing, and in no distress, oriented to person, place, and time and morbidly obese  Mental status - normal mood, behavior, speech, dress, motor activity, and thought processes  Chest - clear to auscultation, no wheezes, rales or rhonchi, symmetric air entry  Heart - normal rate, regular rhythm, normal S1, S2, no murmurs, rubs, clicks or gallops  Extremities - pedal edema 2 + left extending to mid tibia  Skin - erythema and ulceration and ecchymosis with serous drainage  Neuro - antalgic gait    TESTS  Duplex left lower leg negative for DVT    Assessment/Plan:    1. Left leg cellulitis  Start abx and pain management  - cephALEXin (KEFLEX) 500 mg capsule; Take 1 Cap by mouth four (4) times daily for 10 days. Dispense: 40 Cap;  Refill: 0  - oxyCODONE-acetaminophen (PERCOCET) 5-325 mg per tablet; Take 1 Tab by mouth every six (6) hours as needed for Pain for up to 3 days. Max Daily Amount: 4 Tabs. Dispense: 12 Tab; Refill: 0    2. Peripheral vascular disease ([address]  Encourage healthy diet and adherence to prescriptions    3. Hypercholesterolemia  Continue statin therapy    4. Morbid obesity ([address]  I have reviewed/discussed the above normal BMI with the patient. I have recommended the following interventions: dietary management education, guidance, and counseling and monitor weight . .        5. Type 2 diabetes mellitus with peripheral vascular disease (HCC)  Goal hgb a1c <7; borderline controlled. Encourage low carb diet. Lab review: labs reviewed, I note that glycosylated hemoglobin abnormal high      I have discussed the diagnosis with the patient and the intended plan as seen in the above orders. I have discussed medication side effects and warnings with the patient as well. I have reviewed the plan of care with the patient, accepted their input and they are in agreement with the treatment goals.

## 2020-04-23 NOTE — PATIENT INSTRUCTIONS

## 2020-04-23 NOTE — TELEPHONE ENCOUNTER
Called patient to schedule 2020 yearly pharmacist appointment to discuss medications for Diabetes Management Program.    No answer. Left VM on home TAD: Please call back at [phone] Option #7.      Shayna Hernandez, [facility] [employer]   Department, toll free: [phone], option 7

## 2020-04-23 NOTE — TELEPHONE ENCOUNTER
Pt stated that he spoke to Dr. Luwana Dakin last saturday 4/18/2020. He stated that he is not doing good and his bumps feel like its really hot. Pt explained that his left leg is really painful and would like to get a call back.

## 2020-04-23 NOTE — LETTER
[address] [facility] [address] Phone: toll free [phone] option 7 
 
 
 
Mr. Nimisha Harris [address] [facility] [address] Congratulations! You have completed the requirements for the [facility] Diabetes Management Program and have been enrolled into the Program as of April 1st, 2020. One of the requirements to participate in the [facility] Diabetes Management Program is to complete a Clinical Pharmacist Telephone appointment yearly. The [address] [place] [address] Team has attempted to contact you to schedule your 2020 Diabetes Management telephone appointment but was unable to reach you. We would like to work with you and your doctor to: - Review your medications, including over-the-counter and herbal medications - Answer questions about your medications and how to get the most benefit from them - Identify potential drug interactions or side effects and help fix them - Identify preferred medications that are equally effective, but available at a lower cost to you 
- Help you reach the necessary requirements to remain enrolled in the Diabetes Management Program offered by [facility] Please call [phone] and select option #7 to schedule this appointment to take advantage of this service. Telephone appointments are available Monday thru Friday from 7:30 AM till 5:30 PM. Sincerely, [address] [facility] Phone: [phone], option 7 Email: [email or web address] Fax: [phone]

## 2020-04-24 DIAGNOSIS — R11.0 NAUSEA: Primary | ICD-10-CM

## 2020-04-24 RX ORDER — ONDANSETRON 4 MG/1
4 TABLET, ORALLY DISINTEGRATING ORAL
Qty: 12 TAB | Refills: 0 | Status: SHIPPED | OUTPATIENT
Start: 2020-04-24 | End: 2022-03-15

## 2020-04-27 ENCOUNTER — APPOINTMENT (OUTPATIENT)
Dept: GENERAL RADIOLOGY | Age: 60
DRG: 871 | End: 2020-04-27
Attending: EMERGENCY MEDICINE
Payer: COMMERCIAL

## 2020-04-27 ENCOUNTER — HOSPITAL ENCOUNTER (INPATIENT)
Age: 60
LOS: 3 days | Discharge: HOME | DRG: 871 | End: 2020-05-01
Attending: EMERGENCY MEDICINE | Admitting: INTERNAL MEDICINE
Payer: COMMERCIAL

## 2020-04-27 ENCOUNTER — APPOINTMENT (OUTPATIENT)
Dept: CT IMAGING | Age: 60
DRG: 871 | End: 2020-04-27
Attending: EMERGENCY MEDICINE
Payer: COMMERCIAL

## 2020-04-27 ENCOUNTER — HOSPITAL ENCOUNTER (EMERGENCY)
Dept: CT IMAGING | Age: 60
Discharge: HOME | DRG: 871 | End: 2020-04-27
Attending: EMERGENCY MEDICINE
Payer: COMMERCIAL

## 2020-04-27 ENCOUNTER — TELEPHONE (OUTPATIENT)
Dept: FAMILY MEDICINE CLINIC | Age: 60
End: 2020-04-27

## 2020-04-27 ENCOUNTER — VIRTUAL VISIT (OUTPATIENT)
Dept: FAMILY MEDICINE CLINIC | Age: 60
End: 2020-04-27

## 2020-04-27 VITALS — TEMPERATURE: 96.1 F

## 2020-04-27 DIAGNOSIS — L03.116 LEFT LEG CELLULITIS: ICD-10-CM

## 2020-04-27 DIAGNOSIS — I73.9 PERIPHERAL VASCULAR DISEASE (HCC): ICD-10-CM

## 2020-04-27 DIAGNOSIS — J96.02 ACUTE RESPIRATORY FAILURE WITH HYPOXIA AND HYPERCAPNIA (HCC): ICD-10-CM

## 2020-04-27 DIAGNOSIS — J18.9 COMMUNITY ACQUIRED PNEUMONIA OF RIGHT LUNG, UNSPECIFIED PART OF LUNG: Primary | ICD-10-CM

## 2020-04-27 DIAGNOSIS — R06.2 WHEEZING: Primary | ICD-10-CM

## 2020-04-27 DIAGNOSIS — J96.01 ACUTE RESPIRATORY FAILURE WITH HYPOXIA AND HYPERCAPNIA (HCC): ICD-10-CM

## 2020-04-27 DIAGNOSIS — E66.01 MORBID OBESITY (HCC): ICD-10-CM

## 2020-04-27 DIAGNOSIS — R09.02 HYPOXIA: ICD-10-CM

## 2020-04-27 DIAGNOSIS — R29.898 DECREASED GRIP STRENGTH: ICD-10-CM

## 2020-04-27 DIAGNOSIS — Z20.822 SUSPECTED 2019 NOVEL CORONAVIRUS INFECTION: ICD-10-CM

## 2020-04-27 DIAGNOSIS — E11.51 TYPE 2 DIABETES MELLITUS WITH PERIPHERAL VASCULAR DISEASE (HCC): ICD-10-CM

## 2020-04-27 DIAGNOSIS — N17.9 ACUTE KIDNEY INJURY (NONTRAUMATIC) (HCC): ICD-10-CM

## 2020-04-27 DIAGNOSIS — L03.116 CELLULITIS OF LEFT LEG: ICD-10-CM

## 2020-04-27 DIAGNOSIS — E78.00 HYPERCHOLESTEROLEMIA: ICD-10-CM

## 2020-04-27 DIAGNOSIS — R73.9 ACUTE HYPERGLYCEMIA: ICD-10-CM

## 2020-04-27 LAB
ALBUMIN SERPL-MCNC: 3.5 G/DL (ref 3.4–5)
ALBUMIN/GLOB SERPL: 1.1 {RATIO} (ref 0.8–1.7)
ALP SERPL-CCNC: 89 U/L (ref 45–117)
ALT SERPL-CCNC: 287 U/L (ref 16–61)
AMMONIA PLAS-SCNC: <10 UMOL/L (ref 11–32)
ANION GAP SERPL CALC-SCNC: 3 MMOL/L (ref 3–18)
APPEARANCE UR: CLEAR
AST SERPL-CCNC: 223 U/L (ref 10–38)
BASOPHILS # BLD: 0 K/UL (ref 0–0.1)
BASOPHILS NFR BLD: 0 % (ref 0–2)
BILIRUB SERPL-MCNC: 1.1 MG/DL (ref 0.2–1)
BILIRUB UR QL: NEGATIVE
BNP SERPL-MCNC: 2553 PG/ML (ref 0–900)
BUN SERPL-MCNC: 56 MG/DL (ref 7–18)
BUN/CREAT SERPL: 39 (ref 12–20)
CALCIUM SERPL-MCNC: 8.1 MG/DL (ref 8.5–10.1)
CHLORIDE SERPL-SCNC: 96 MMOL/L (ref 100–111)
CK MB CFR SERPL CALC: 3 % (ref 0–4)
CK MB SERPL-MCNC: 4.2 NG/ML (ref 5–25)
CK SERPL-CCNC: 142 U/L (ref 39–308)
CO2 SERPL-SCNC: 33 MMOL/L (ref 21–32)
COLOR UR: YELLOW
CREAT SERPL-MCNC: 1.42 MG/DL (ref 0.6–1.3)
D DIMER PPP FEU-MCNC: 1.37 UG/ML(FEU)
DIFFERENTIAL METHOD BLD: ABNORMAL
EOSINOPHIL # BLD: 0 K/UL (ref 0–0.4)
EOSINOPHIL NFR BLD: 0 % (ref 0–5)
ERYTHROCYTE [DISTWIDTH] IN BLOOD BY AUTOMATED COUNT: 14.9 % (ref 11.6–14.5)
GLOBULIN SER CALC-MCNC: 3.3 G/DL (ref 2–4)
GLUCOSE BLD STRIP.AUTO-MCNC: 278 MG/DL (ref 70–110)
GLUCOSE SERPL-MCNC: 295 MG/DL (ref 74–99)
GLUCOSE UR STRIP.AUTO-MCNC: 500 MG/DL
HCT VFR BLD AUTO: 44.1 % (ref 36–48)
HGB BLD-MCNC: 13.9 G/DL (ref 13–16)
HGB UR QL STRIP: NEGATIVE
KETONES UR QL STRIP.AUTO: NEGATIVE MG/DL
LACTATE SERPL-SCNC: 1.8 MMOL/L (ref 0.4–2)
LEUKOCYTE ESTERASE UR QL STRIP.AUTO: NEGATIVE
LYMPHOCYTES # BLD: 2.2 K/UL (ref 0.9–3.6)
LYMPHOCYTES NFR BLD: 15 % (ref 21–52)
MCH RBC QN AUTO: 30.3 PG (ref 24–34)
MCHC RBC AUTO-ENTMCNC: 31.5 G/DL (ref 31–37)
MCV RBC AUTO: 96.1 FL (ref 74–97)
MONOCYTES # BLD: 1.6 K/UL (ref 0.05–1.2)
MONOCYTES NFR BLD: 11 % (ref 3–10)
NEUTS SEG # BLD: 11.2 K/UL (ref 1.8–8)
NEUTS SEG NFR BLD: 74 % (ref 40–73)
NITRITE UR QL STRIP.AUTO: NEGATIVE
PH UR STRIP: 5.5 [PH] (ref 5–8)
PLATELET # BLD AUTO: 275 K/UL (ref 135–420)
PMV BLD AUTO: 10.2 FL (ref 9.2–11.8)
POTASSIUM SERPL-SCNC: 4.8 MMOL/L (ref 3.5–5.5)
PROT SERPL-MCNC: 6.8 G/DL (ref 6.4–8.2)
PROT UR STRIP-MCNC: NEGATIVE MG/DL
RBC # BLD AUTO: 4.59 M/UL (ref 4.7–5.5)
SODIUM SERPL-SCNC: 132 MMOL/L (ref 136–145)
SP GR UR REFRACTOMETRY: 1.02 (ref 1–1.03)
TROPONIN I SERPL-MCNC: 0.2 NG/ML (ref 0–0.04)
TSH SERPL DL<=0.05 MIU/L-ACNC: 0.93 UIU/ML (ref 0.36–3.74)
UROBILINOGEN UR QL STRIP.AUTO: 2 EU/DL (ref 0.2–1)
WBC # BLD AUTO: 15 K/UL (ref 4.6–13.2)

## 2020-04-27 PROCEDURE — 80053 COMPREHEN METABOLIC PANEL: CPT

## 2020-04-27 PROCEDURE — 5A09457 ASSISTANCE WITH RESPIRATORY VENTILATION, 24-96 CONSECUTIVE HOURS, CONTINUOUS POSITIVE AIRWAY PRESSURE: ICD-10-PCS | Performed by: INTERNAL MEDICINE

## 2020-04-27 PROCEDURE — 96366 THER/PROPH/DIAG IV INF ADDON: CPT

## 2020-04-27 PROCEDURE — 74011250636 HC RX REV CODE- 250/636: Performed by: EMERGENCY MEDICINE

## 2020-04-27 PROCEDURE — 87040 BLOOD CULTURE FOR BACTERIA: CPT

## 2020-04-27 PROCEDURE — 74011000258 HC RX REV CODE- 258: Performed by: EMERGENCY MEDICINE

## 2020-04-27 PROCEDURE — 82962 GLUCOSE BLOOD TEST: CPT

## 2020-04-27 PROCEDURE — 96365 THER/PROPH/DIAG IV INF INIT: CPT

## 2020-04-27 PROCEDURE — 87635 SARS-COV-2 COVID-19 AMP PRB: CPT

## 2020-04-27 PROCEDURE — 94762 N-INVAS EAR/PLS OXIMTRY CONT: CPT

## 2020-04-27 PROCEDURE — 82140 ASSAY OF AMMONIA: CPT

## 2020-04-27 PROCEDURE — 71275 CT ANGIOGRAPHY CHEST: CPT

## 2020-04-27 PROCEDURE — 83605 ASSAY OF LACTIC ACID: CPT

## 2020-04-27 PROCEDURE — 84443 ASSAY THYROID STIM HORMONE: CPT

## 2020-04-27 PROCEDURE — 81003 URINALYSIS AUTO W/O SCOPE: CPT

## 2020-04-27 PROCEDURE — 96375 TX/PRO/DX INJ NEW DRUG ADDON: CPT

## 2020-04-27 PROCEDURE — 83880 ASSAY OF NATRIURETIC PEPTIDE: CPT

## 2020-04-27 PROCEDURE — 99285 EMERGENCY DEPT VISIT HI MDM: CPT

## 2020-04-27 PROCEDURE — 96360 HYDRATION IV INFUSION INIT: CPT

## 2020-04-27 PROCEDURE — 71045 X-RAY EXAM CHEST 1 VIEW: CPT

## 2020-04-27 PROCEDURE — 82550 ASSAY OF CK (CPK): CPT

## 2020-04-27 PROCEDURE — 85379 FIBRIN DEGRADATION QUANT: CPT

## 2020-04-27 PROCEDURE — 70450 CT HEAD/BRAIN W/O DYE: CPT

## 2020-04-27 PROCEDURE — 85025 COMPLETE CBC W/AUTO DIFF WBC: CPT

## 2020-04-27 PROCEDURE — 74011250637 HC RX REV CODE- 250/637: Performed by: EMERGENCY MEDICINE

## 2020-04-27 PROCEDURE — 93005 ELECTROCARDIOGRAM TRACING: CPT

## 2020-04-27 RX ORDER — SODIUM CHLORIDE 9 MG/ML
100 INJECTION, SOLUTION INTRAVENOUS ONCE
Status: COMPLETED | OUTPATIENT
Start: 2020-04-27 | End: 2020-04-28

## 2020-04-27 RX ORDER — GUAIFENESIN 100 MG/5ML
162 LIQUID (ML) ORAL
Status: COMPLETED | OUTPATIENT
Start: 2020-04-27 | End: 2020-04-27

## 2020-04-27 RX ORDER — SODIUM CHLORIDE 0.9 % (FLUSH) 0.9 %
5-10 SYRINGE (ML) INJECTION AS NEEDED
Status: DISCONTINUED | OUTPATIENT
Start: 2020-04-27 | End: 2020-05-01 | Stop reason: HOSPADM

## 2020-04-27 RX ORDER — PROMETHAZINE HYDROCHLORIDE 25 MG/1
25 TABLET ORAL
COMMUNITY
End: 2020-05-01

## 2020-04-27 RX ADMIN — ASPIRIN 81 MG CHEWABLE TABLET 162 MG: 81 TABLET CHEWABLE at 23:52

## 2020-04-27 RX ADMIN — CEFTRIAXONE 2 G: 2 INJECTION, POWDER, FOR SOLUTION INTRAMUSCULAR; INTRAVENOUS at 22:34

## 2020-04-27 RX ADMIN — SODIUM CHLORIDE 1000 ML: 900 INJECTION, SOLUTION INTRAVENOUS at 21:05

## 2020-04-27 RX ADMIN — AZITHROMYCIN MONOHYDRATE 500 MG: 500 INJECTION, POWDER, LYOPHILIZED, FOR SOLUTION INTRAVENOUS at 22:26

## 2020-04-27 NOTE — PROGRESS NOTES
Notified by pt's wife that his blood glucose at home had increased from 382 to 400 with increased lethargy. He has been sleeping throughout the day. Notified attending ER physician at [facility] that pt's wife would be bring him in.

## 2020-04-27 NOTE — TELEPHONE ENCOUNTER
Second attempt made to contact patient to schedule 2020 yearly pharmacist appointment to discuss medications for Diabetes Management Program.    No answer. Left VM on home TAD: Please call back at [phone] Option #7. Shayna Hernandez, [facility] [employer]   Department, toll free: [phone], option 7     Letter and MyChart message also sent.

## 2020-04-27 NOTE — PROGRESS NOTES
Sakina Joya is a 63 y.o.  male and presents with    Chief Complaint   Patient presents with    Lip Swelling     He is evaluated via doxy. me on 4/27/2020. Consent:  He and/or health care decision maker is aware that he may receive a bill for this audio/video service, depending on his insurance coverage, and has provided verbal consent to proceed: Yes  He is at his home, and [facility] assisting with this visit. Documentation:  I communicated with the patient and/or health care decision maker about cellulitis and wheezing. Details of this discussion including any medical advice provided: see below      I affirm this is a Patient Initiated Episode with an Established Patient who has not had a related appointment within my department in the past 7 days or scheduled within the next 24 hours. Total Time: minutes: 21-30 minutes    Note: not billable if this call serves to triage the patient into an appointment for the relevant concern      Karen Duval MD         Subjective:  He is disoriented and cannot hold phone or glass of water. He is weak and this started yesterday. He does not feel weak. He has mild tremors in his hands. He is wheezing and has a cough. Cough started yesterday. He has decreased appetite. He has decreased fluid intake. He usually goes to the restroom every 2 hours but he did not void as much yesterday. He denies dizziness. He did not take his medications yesterday. Yesterday he had trouble walking and had to hold onto his wife. He denies increased thirst.  He is trying to stay hydrated. He was out for imaging and office visit 3 days ago. He was 6 feet away from everyone. He did go to the grocery store on his way home. He wore a mask in the grocery store. There is COVID 19 pandemic which has everyone on high alert. He had mildly slurred speech. His cough is dry. He had pneumonia 4 years ago.   He has albuterol inhaler which was prescribed at that time. He is following up for lower extremity edema and suspected cellulitis. He is taking antibiotics. His leg has decreased erythema and edema. Cardiovascular Review:  The patient has diabetes, hypertension, hyperlipidemia, coronary artery disease and obesity. Diet and Lifestyle: generally follows a low fat low cholesterol diet, generally follows a low sodium diet, follows a diabetic diet regularly, exercises regularly, nonsmoker  Home BP Monitoring: is well controlled at home, ranging 130's/85's. Pertinent ROS: taking medications as instructed, no medication side effects noted, no TIA's, no chest pain on exertion, no dyspnea on exertion, no swelling of ankles.   Diabetes Mellitus:  He has diabetes mellitus. Diabetic ROS - medication compliance: compliant all of the time, diabetic diet compliance: compliant most of the time, home glucose monitoring: is performed regularly. Lab review: labs reviewed, I note that glycosylated hemoglobin mildly abnormal but acceptable.      ROS   General ROS: negative for - chills, fatigue or fever  Psychological ROS: negative for - anxiety or depression  Ophthalmic ROS: positive for - uses glasses  ENT ROS: negative for - headaches, nasal congestion or sore throat; no rhinorrhea  Endocrine ROS: negative for - polydipsia/polyuria or temperature intolerance  Cardiovascular ROS: no chest pain or dyspnea on exertion  Gastrointestinal ROS: no abdominal pain, change in bowel habits, or black or bloody stools  Genito-Urinary ROS: no dysuria, trouble voiding, or hematuria  Musculoskeletal ROS: negative for - joint pain or muscle pain  Neurological ROS: negative for - numbness/tingling      All other systems reviewed and are negative.       Objective:  Vitals:    04/27/20 0822   Temp: 96.1 °F (35.6 °C)   TempSrc: Oral   PainSc:   5   PainLoc: Generalized     alert, well appearing, and in no distress, oriented to person, place, and time and morbidly obese  Mental status - normal mood, behavior, speech, dress, motor activity, and thought processes  Chest - normal work of breathing. Audible wheezing  Extremities - pedal edema 1 + left extending to mid tibia  Skin - decreased erythema with ongoing eschar  Neuro CN II-XII intact; no asymmetry noted on motor exam    Assessment/Plan:    1. Wheezing  Ddx: copd vs pneumonia; Recommend use of inhaler    2. Decreased  strength  Rest and hydration; discussed warning symptoms for stroke    3. Left leg cellulitis  Improved with antibiotic and elevation; complete course    4. Peripheral vascular disease ([facility]  Continue medications as prescribed    5. Hypercholesterolemia  Continue statin therapy    6. Morbid obesity ([facility]  I have reviewed/discussed the above normal BMI with the patient and spouse. I have recommended the following interventions: dietary management education, guidance, and counseling and monitor weight . .        7. Type 2 diabetes mellitus with peripheral vascular disease ([facility]  Awaiting glucose level       Lab review: no lab studies available for review at time of visit      I have discussed the diagnosis with the patient and the intended plan as seen in the above orders. I have discussed medication side effects and warnings with the patient as well. I have reviewed the plan of care with the patient, accepted their input and they are in agreement with the treatment goals.

## 2020-04-27 NOTE — TELEPHONE ENCOUNTER
Pt's wife Sue Click just reporting patient's current condition [address] and not doing better. Pt is disoriented and difficulty holding things. Pt have been sleeping all day since the last time he spoke to Dr. Allison Barrow.  Asking to call the patient [phone]

## 2020-04-27 NOTE — PROGRESS NOTES
Balbir Juan presents today for   Chief Complaint   Patient presents with    Lip Swelling       Is someone accompanying this pt? na    Is the patient using any DME equipment during OV? na    Depression Screening:  3 most recent PHQ Screens 8/9/2018   Little interest or pleasure in doing things Not at all   Feeling down, depressed, irritable, or hopeless Not at all   Total Score PHQ 2 0       Learning Assessment:  Learning Assessment 3/19/2014   PRIMARY LEARNER Patient   HIGHEST LEVEL OF EDUCATION - PRIMARY LEARNER  2 YEARS Kary PRIMARY LEARNER NONE   CO-LEARNER CAREGIVER No   PRIMARY LANGUAGE ENGLISH   LEARNER PREFERENCE PRIMARY READING     -     -   ANSWERED BY self   RELATIONSHIP SELF       Abuse Screening:  Abuse Screening Questionnaire 3/19/2014   Do you ever feel afraid of your partner? N   Are you in a relationship with someone who physically or mentally threatens you? N   Is it safe for you to go home? Y       Fall Risk  No flowsheet data found. Health Maintenance reviewed and discussed and ordered per Provider. Health Maintenance Due   Topic Date Due    Shingrix Vaccine Age 49> (1 of 2) 07/21/2011    Pneumococcal 0-64 years (1 of 1 - PPSV23) 11/16/2011    Eye Exam Retinal or Dilated  03/06/2020    A1C test (Diabetic or Prediabetic)  04/22/2020   . Coordination of Care:  1. Have you been to the ER, urgent care clinic since your last visit? Hospitalized since your last visit? no    2. Have you seen or consulted any other health care providers outside of the [address] [place] [address] since your last visit? Include any pap smears or colon screening. no      Last  Checked na  Last UDS Checked na  Last Pain contract signed: na    Patient presents in office today for routine care. Patient concerns: swollen left leg, and congestion.

## 2020-04-28 PROBLEM — N17.9 STAGE 1 ACUTE KIDNEY INJURY (HCC): Status: ACTIVE | Noted: 2020-04-28

## 2020-04-28 PROBLEM — A41.9 SEPSIS (HCC): Status: ACTIVE | Noted: 2020-04-28

## 2020-04-28 PROBLEM — G93.41 METABOLIC ENCEPHALOPATHY: Status: ACTIVE | Noted: 2020-04-28

## 2020-04-28 PROBLEM — J96.02 ACUTE RESPIRATORY FAILURE WITH HYPOXIA AND HYPERCAPNIA (HCC): Status: ACTIVE | Noted: 2020-04-28

## 2020-04-28 PROBLEM — J18.9 MULTIFOCAL PNEUMONIA: Status: ACTIVE | Noted: 2020-04-28

## 2020-04-28 PROBLEM — J18.9 COMMUNITY ACQUIRED PNEUMONIA: Status: ACTIVE | Noted: 2020-04-28

## 2020-04-28 PROBLEM — J96.01 ACUTE RESPIRATORY FAILURE WITH HYPOXIA AND HYPERCAPNIA (HCC): Status: ACTIVE | Noted: 2020-04-28

## 2020-04-28 LAB
ALBUMIN SERPL-MCNC: 3.1 G/DL (ref 3.4–5)
ALBUMIN/GLOB SERPL: 0.8 {RATIO} (ref 0.8–1.7)
ALP SERPL-CCNC: 76 U/L (ref 45–117)
ALT SERPL-CCNC: 242 U/L (ref 16–61)
ANION GAP SERPL CALC-SCNC: 5 MMOL/L (ref 3–18)
ARTERIAL PATENCY WRIST A: YES
ARTERIAL PATENCY WRIST A: YES
AST SERPL-CCNC: 143 U/L (ref 10–38)
ATRIAL RATE: 69 BPM
ATRIAL RATE: 85 BPM
B PERT DNA SPEC QL NAA+PROBE: NOT DETECTED
BASE EXCESS BLD CALC-SCNC: 6 MMOL/L
BASE EXCESS BLD CALC-SCNC: 6 MMOL/L
BASOPHILS # BLD: 0 K/UL (ref 0–0.1)
BASOPHILS NFR BLD: 0 % (ref 0–3)
BDY SITE: ABNORMAL
BDY SITE: ABNORMAL
BILIRUB SERPL-MCNC: 0.8 MG/DL (ref 0.2–1)
BODY TEMPERATURE: 97.8
BODY TEMPERATURE: 98.7
BORDETELLA PARAPERTUSSIS PCR, BORPAR: NOT DETECTED
BUN SERPL-MCNC: 47 MG/DL (ref 7–18)
BUN/CREAT SERPL: 37 (ref 12–20)
C PNEUM DNA SPEC QL NAA+PROBE: NOT DETECTED
CALCIUM SERPL-MCNC: 7.6 MG/DL (ref 8.5–10.1)
CALCULATED P AXIS, ECG09: 24 DEGREES
CALCULATED P AXIS, ECG09: 25 DEGREES
CALCULATED R AXIS, ECG10: -42 DEGREES
CALCULATED R AXIS, ECG10: -46 DEGREES
CALCULATED T AXIS, ECG11: -24 DEGREES
CALCULATED T AXIS, ECG11: 13 DEGREES
CHLORIDE SERPL-SCNC: 98 MMOL/L (ref 100–111)
CO2 SERPL-SCNC: 32 MMOL/L (ref 21–32)
CREAT SERPL-MCNC: 1.27 MG/DL (ref 0.6–1.3)
DIAGNOSIS, 93000: NORMAL
DIAGNOSIS, 93000: NORMAL
DIFFERENTIAL METHOD BLD: ABNORMAL
EOSINOPHIL # BLD: 0 K/UL (ref 0–0.4)
EOSINOPHIL NFR BLD: 0 % (ref 0–5)
ERYTHROCYTE [DISTWIDTH] IN BLOOD BY AUTOMATED COUNT: 14.9 % (ref 11.6–14.5)
EST. AVERAGE GLUCOSE BLD GHB EST-MCNC: 258 MG/DL
FLUAV H1 2009 PAND RNA SPEC QL NAA+PROBE: NOT DETECTED
FLUAV H1 RNA SPEC QL NAA+PROBE: NOT DETECTED
FLUAV H3 RNA SPEC QL NAA+PROBE: NOT DETECTED
FLUAV SUBTYP SPEC NAA+PROBE: NOT DETECTED
FLUBV RNA SPEC QL NAA+PROBE: NOT DETECTED
GAS FLOW.O2 O2 DELIVERY SYS: ABNORMAL L/MIN
GAS FLOW.O2 O2 DELIVERY SYS: ABNORMAL L/MIN
GAS FLOW.O2 SETTING OXYMISER: 15 L/M
GLOBULIN SER CALC-MCNC: 3.7 G/DL (ref 2–4)
GLUCOSE BLD STRIP.AUTO-MCNC: 228 MG/DL (ref 70–110)
GLUCOSE BLD STRIP.AUTO-MCNC: 238 MG/DL (ref 70–110)
GLUCOSE BLD STRIP.AUTO-MCNC: 261 MG/DL (ref 70–110)
GLUCOSE SERPL-MCNC: 223 MG/DL (ref 74–99)
HADV DNA SPEC QL NAA+PROBE: NOT DETECTED
HBA1C MFR BLD: 10.6 % (ref 4.2–5.6)
HCO3 BLD-SCNC: 32.8 MMOL/L (ref 22–26)
HCO3 BLD-SCNC: 33.1 MMOL/L (ref 22–26)
HCOV 229E RNA SPEC QL NAA+PROBE: NOT DETECTED
HCOV HKU1 RNA SPEC QL NAA+PROBE: NOT DETECTED
HCOV NL63 RNA SPEC QL NAA+PROBE: NOT DETECTED
HCOV OC43 RNA SPEC QL NAA+PROBE: NOT DETECTED
HCT VFR BLD AUTO: 43.4 % (ref 36–48)
HGB BLD-MCNC: 13.7 G/DL (ref 13–16)
HMPV RNA SPEC QL NAA+PROBE: NOT DETECTED
HPIV1 RNA SPEC QL NAA+PROBE: NOT DETECTED
HPIV2 RNA SPEC QL NAA+PROBE: NOT DETECTED
HPIV3 RNA SPEC QL NAA+PROBE: NOT DETECTED
HPIV4 RNA SPEC QL NAA+PROBE: NOT DETECTED
LYMPHOCYTES # BLD: 1.3 K/UL (ref 0.8–3.5)
LYMPHOCYTES NFR BLD: 10 % (ref 20–51)
M PNEUMO DNA SPEC QL NAA+PROBE: NOT DETECTED
MCH RBC QN AUTO: 30.4 PG (ref 24–34)
MCHC RBC AUTO-ENTMCNC: 31.6 G/DL (ref 31–37)
MCV RBC AUTO: 96.2 FL (ref 74–97)
METAMYELOCYTES NFR BLD MANUAL: 2 %
MONOCYTES # BLD: 1.2 K/UL (ref 0–1)
MONOCYTES NFR BLD: 9 % (ref 2–9)
NEUTS BAND NFR BLD MANUAL: 2 % (ref 0–5)
NEUTS SEG # BLD: 10 K/UL (ref 1.8–8)
NEUTS SEG NFR BLD: 77 % (ref 42–75)
NRBC BLD-RTO: 1 PER 100 WBC
O2/TOTAL GAS SETTING VFR VENT: 100 %
O2/TOTAL GAS SETTING VFR VENT: 70 %
P-R INTERVAL, ECG05: 166 MS
P-R INTERVAL, ECG05: 188 MS
PCO2 BLD: 70.3 MMHG (ref 35–45)
PCO2 BLD: 75.4 MMHG (ref 35–45)
PEEP RESPIRATORY: 8 CMH2O
PH BLD: 7.25 [PH] (ref 7.35–7.45)
PH BLD: 7.28 [PH] (ref 7.35–7.45)
PIP ISTAT,IPIP: 19
PLATELET # BLD AUTO: 261 K/UL (ref 135–420)
PLATELET COMMENTS,PCOM: ABNORMAL
PMV BLD AUTO: 10.1 FL (ref 9.2–11.8)
PO2 BLD: 77 MMHG (ref 80–100)
PO2 BLD: 82 MMHG (ref 80–100)
POTASSIUM SERPL-SCNC: 5 MMOL/L (ref 3.5–5.5)
PRESSURE SUPPORT SETTING VENT: 10 CMH2O
PROT SERPL-MCNC: 6.8 G/DL (ref 6.4–8.2)
Q-T INTERVAL, ECG07: 384 MS
Q-T INTERVAL, ECG07: 396 MS
QRS DURATION, ECG06: 96 MS
QRS DURATION, ECG06: 96 MS
QTC CALCULATION (BEZET), ECG08: 411 MS
QTC CALCULATION (BEZET), ECG08: 471 MS
RBC # BLD AUTO: 4.51 M/UL (ref 4.7–5.5)
RBC MORPH BLD: ABNORMAL
RBC MORPH BLD: ABNORMAL
RSV RNA SPEC QL NAA+PROBE: NOT DETECTED
RV+EV RNA SPEC QL NAA+PROBE: NOT DETECTED
SAO2 % BLD: 92 % (ref 92–97)
SAO2 % BLD: 94 % (ref 92–97)
SERVICE CMNT-IMP: ABNORMAL
SERVICE CMNT-IMP: ABNORMAL
SODIUM SERPL-SCNC: 135 MMOL/L (ref 136–145)
SPECIMEN TYPE: ABNORMAL
SPECIMEN TYPE: ABNORMAL
TOTAL RESP. RATE, ITRR: 16
TOTAL RESP. RATE, ITRR: 21
TROPONIN I SERPL-MCNC: 0.17 NG/ML (ref 0–0.04)
TROPONIN I SERPL-MCNC: 0.29 NG/ML (ref 0–0.04)
VENTRICULAR RATE, ECG03: 69 BPM
VENTRICULAR RATE, ECG03: 85 BPM
WBC # BLD AUTO: 13 K/UL (ref 4.6–13.2)

## 2020-04-28 PROCEDURE — 84484 ASSAY OF TROPONIN QUANT: CPT

## 2020-04-28 PROCEDURE — 94762 N-INVAS EAR/PLS OXIMTRY CONT: CPT

## 2020-04-28 PROCEDURE — 94660 CPAP INITIATION&MGMT: CPT

## 2020-04-28 PROCEDURE — 36600 WITHDRAWAL OF ARTERIAL BLOOD: CPT

## 2020-04-28 PROCEDURE — 77030035694 HC MSK BIPAP FLL FAC PERFMAX PHIL -B

## 2020-04-28 PROCEDURE — 65660000001 HC RM ICU INTERMED STEPDOWN

## 2020-04-28 PROCEDURE — 93005 ELECTROCARDIOGRAM TRACING: CPT

## 2020-04-28 PROCEDURE — 0100U RESPIRATORY PANEL,PCR,NASOPHARYNGEAL: CPT

## 2020-04-28 PROCEDURE — 74011000250 HC RX REV CODE- 250: Performed by: EMERGENCY MEDICINE

## 2020-04-28 PROCEDURE — 74011636320 HC RX REV CODE- 636/320: Performed by: EMERGENCY MEDICINE

## 2020-04-28 PROCEDURE — C9113 INJ PANTOPRAZOLE SODIUM, VIA: HCPCS | Performed by: INTERNAL MEDICINE

## 2020-04-28 PROCEDURE — 74011250637 HC RX REV CODE- 250/637: Performed by: INTERNAL MEDICINE

## 2020-04-28 PROCEDURE — 74011000258 HC RX REV CODE- 258: Performed by: EMERGENCY MEDICINE

## 2020-04-28 PROCEDURE — 82803 BLOOD GASES ANY COMBINATION: CPT

## 2020-04-28 PROCEDURE — 80053 COMPREHEN METABOLIC PANEL: CPT

## 2020-04-28 PROCEDURE — 74011250636 HC RX REV CODE- 250/636: Performed by: INTERNAL MEDICINE

## 2020-04-28 PROCEDURE — 74011000258 HC RX REV CODE- 258: Performed by: INTERNAL MEDICINE

## 2020-04-28 PROCEDURE — 83036 HEMOGLOBIN GLYCOSYLATED A1C: CPT

## 2020-04-28 PROCEDURE — 82962 GLUCOSE BLOOD TEST: CPT

## 2020-04-28 PROCEDURE — 85025 COMPLETE CBC W/AUTO DIFF WBC: CPT

## 2020-04-28 PROCEDURE — 74011636637 HC RX REV CODE- 636/637: Performed by: INTERNAL MEDICINE

## 2020-04-28 RX ORDER — ACETAMINOPHEN 325 MG/1
650 TABLET ORAL
Status: DISCONTINUED | OUTPATIENT
Start: 2020-04-28 | End: 2020-05-01 | Stop reason: HOSPADM

## 2020-04-28 RX ORDER — ONDANSETRON 2 MG/ML
4 INJECTION INTRAMUSCULAR; INTRAVENOUS
Status: DISCONTINUED | OUTPATIENT
Start: 2020-04-28 | End: 2020-05-01 | Stop reason: HOSPADM

## 2020-04-28 RX ORDER — INSULIN LISPRO 100 [IU]/ML
INJECTION, SOLUTION INTRAVENOUS; SUBCUTANEOUS
Status: DISCONTINUED | OUTPATIENT
Start: 2020-04-28 | End: 2020-05-01 | Stop reason: HOSPADM

## 2020-04-28 RX ORDER — IPRATROPIUM BROMIDE 0.5 MG/2.5ML
0.5 SOLUTION RESPIRATORY (INHALATION)
Status: COMPLETED | OUTPATIENT
Start: 2020-04-28 | End: 2020-04-28

## 2020-04-28 RX ORDER — MAGNESIUM SULFATE 100 %
4 CRYSTALS MISCELLANEOUS AS NEEDED
Status: DISCONTINUED | OUTPATIENT
Start: 2020-04-28 | End: 2020-05-01 | Stop reason: HOSPADM

## 2020-04-28 RX ORDER — DEXTROSE MONOHYDRATE 100 MG/ML
125-250 INJECTION, SOLUTION INTRAVENOUS AS NEEDED
Status: DISCONTINUED | OUTPATIENT
Start: 2020-04-28 | End: 2020-05-01 | Stop reason: HOSPADM

## 2020-04-28 RX ORDER — LANOLIN ALCOHOL/MO/W.PET/CERES
12 CREAM (GRAM) TOPICAL
Status: DISCONTINUED | OUTPATIENT
Start: 2020-04-28 | End: 2020-05-01 | Stop reason: HOSPADM

## 2020-04-28 RX ORDER — ALBUTEROL SULFATE 0.83 MG/ML
5 SOLUTION RESPIRATORY (INHALATION)
Status: COMPLETED | OUTPATIENT
Start: 2020-04-28 | End: 2020-04-28

## 2020-04-28 RX ORDER — HYDROCORTISONE AND ACETIC ACID 20.75; 10.375 MG/ML; MG/ML
2 SOLUTION AURICULAR (OTIC) 2 TIMES DAILY
Status: DISCONTINUED | OUTPATIENT
Start: 2020-04-28 | End: 2020-05-01 | Stop reason: HOSPADM

## 2020-04-28 RX ORDER — GUAIFENESIN 100 MG/5ML
81 LIQUID (ML) ORAL DAILY
Status: DISCONTINUED | OUTPATIENT
Start: 2020-04-28 | End: 2020-05-01 | Stop reason: HOSPADM

## 2020-04-28 RX ORDER — AMLODIPINE BESYLATE 5 MG/1
5 TABLET ORAL DAILY
Status: DISCONTINUED | OUTPATIENT
Start: 2020-04-28 | End: 2020-05-01 | Stop reason: HOSPADM

## 2020-04-28 RX ORDER — PANTOPRAZOLE SODIUM 40 MG/10ML
40 INJECTION, POWDER, LYOPHILIZED, FOR SOLUTION INTRAVENOUS EVERY 24 HOURS
Status: DISCONTINUED | OUTPATIENT
Start: 2020-04-28 | End: 2020-05-01 | Stop reason: HOSPADM

## 2020-04-28 RX ORDER — DOCUSATE SODIUM 100 MG/1
100 CAPSULE, LIQUID FILLED ORAL
Status: DISCONTINUED | OUTPATIENT
Start: 2020-04-28 | End: 2020-05-01 | Stop reason: HOSPADM

## 2020-04-28 RX ADMIN — INSULIN LISPRO 4 UNITS: 100 INJECTION, SOLUTION INTRAVENOUS; SUBCUTANEOUS at 16:43

## 2020-04-28 RX ADMIN — INSULIN LISPRO 6 UNITS: 100 INJECTION, SOLUTION INTRAVENOUS; SUBCUTANEOUS at 21:36

## 2020-04-28 RX ADMIN — IPRATROPIUM BROMIDE 0.5 MG: 0.5 SOLUTION RESPIRATORY (INHALATION) at 05:16

## 2020-04-28 RX ADMIN — PANTOPRAZOLE SODIUM 40 MG: 40 INJECTION, POWDER, FOR SOLUTION INTRAVENOUS at 05:17

## 2020-04-28 RX ADMIN — IOPAMIDOL 75 ML: 755 INJECTION, SOLUTION INTRAVENOUS at 00:14

## 2020-04-28 RX ADMIN — AZITHROMYCIN MONOHYDRATE 500 MG: 500 INJECTION, POWDER, LYOPHILIZED, FOR SOLUTION INTRAVENOUS at 22:20

## 2020-04-28 RX ADMIN — ASPIRIN 81 MG CHEWABLE TABLET 81 MG: 81 TABLET CHEWABLE at 10:09

## 2020-04-28 RX ADMIN — ALBUTEROL SULFATE 5 MG: 2.5 SOLUTION RESPIRATORY (INHALATION) at 05:17

## 2020-04-28 RX ADMIN — SODIUM CHLORIDE 100 ML: 900 INJECTION, SOLUTION INTRAVENOUS at 00:14

## 2020-04-28 RX ADMIN — ACETAMINOPHEN 650 MG: 325 TABLET, FILM COATED ORAL at 22:21

## 2020-04-28 RX ADMIN — AMLODIPINE BESYLATE 5 MG: 5 TABLET ORAL at 10:09

## 2020-04-28 RX ADMIN — CEFTRIAXONE 2 G: 2 INJECTION, POWDER, FOR SOLUTION INTRAMUSCULAR; INTRAVENOUS at 21:42

## 2020-04-28 NOTE — PROGRESS NOTES
TRANSFER - IN REPORT:    Verbal report received from KALE Lan(name) on Jackie Gibson  being received from ED(unit) for routine progression of care      Report consisted of patients Situation, Background, Assessment and   Recommendations(SBAR). Information from the following report(s) SBAR, ED Summary, MAR, Recent Results and Cardiac Rhythm Sinus Rhythm was reviewed with the receiving nurse. Opportunity for questions and clarification was provided. Assessment completed upon patients arrival to unit and care assumed.

## 2020-04-28 NOTE — PROGRESS NOTES
Physical Exam  Constitutional:       Appearance: He is obese. HENT:      Head: Normocephalic. Nose: Nose normal.   Cardiovascular:      Rate and Rhythm: Normal rate and regular rhythm. Abdominal:      General: Bowel sounds are normal.   Skin:     General: Skin is warm. Neurological:      Mental Status: He is alert. GCS: GCS eye subscore is 4. GCS verbal subscore is 5. GCS motor subscore is 6. Cranial Nerves: Cranial nerves are intact. Sensory: Sensation is intact. Motor: Motor function is intact.    Psychiatric:         Mood and Affect: Mood normal.         Behavior: Behavior normal.

## 2020-04-28 NOTE — ED NOTES
Pt up and out of bed, removed BiPAP, removed clothing, removed all cardiac monitor lines including BP and SPO2 monitoring. Pt confused and states Im trying to use the bathroom. Pt urinating on room teresa. MD notified. Pt re-directed back to bed, easily redirected. Placed back on BiPAP and cardiac monitor. Call bell within reach and pt instructed to call nurse when assistance is needed to avoid injury, understanding verbalized, will continue to monitor pt.

## 2020-04-28 NOTE — PROGRESS NOTES
Received patient on BIPAP 18/8, back up rate 8, FiO2 0.70. SpO2 94, RR 13, HR 78. No resp distress or issues noted. Patient resting comfortably on BIPAP. Will continue to monitor resp status, and wean O2, and off BIPAP, as tolerated.

## 2020-04-28 NOTE — PROGRESS NOTES
Problem: Discharge Planning  Goal: *Discharge to safe environment  Outcome: Progressing Towards Goal   Plan is Home with [address]  Reason for Admission: Acute respiratory failure with hypoxia and hypercapnia, Stage I acute kidney injury, Multifocal pneumonia, CAP                    RUR Score:  9%                   Plan for utilizing home health:  Yes        PCP: First and Last name:  Nahun Argueta   Name of Practice: [facility] [address]   Are you a current patient: Yes/No: yes   Approximate date of last visit: 4/27/20                    Current Advanced Directive/Advance Care Plan: Spoke with wife and she's not sure if he has one. Called PCP office and doesn't have Advance Directives                         Transition of Care Plan:  Home with Home Health  Pt on BIPAP, gave permission to call spouse. Spoke with Odelia Rubinstein, wife [phone]. Verified information from facesheet and updated. Lives with spouse. Has one son lives in [place], [place]. Pt independent with ambulation and ADLs prior to admission. He drives. No DMEs. Pt currently works from home. Care Management Interventions  PCP Verified by CM: Yes(virtual visit with [facility] yesterday)  Last Visit to PCP: 04/27/20  Palliative Care Criteria Met (RRAT>21 & CHF Dx)?: No  Mode of Transport at Discharge:  Other (see comment)(family)  Transition of Care Consult (CM Consult): Discharge Planning  Physical Therapy Consult: No  Occupational Therapy Consult: No  Speech Therapy Consult: No  Current Support Network: Lives with Spouse  Confirm Follow Up Transport: Self  The Plan for Transition of Care is Related to the Following Treatment Goals : Home with Home Health  The Patient and/or Patient Representative was Provided with a Choice of Provider and Agrees with the Discharge Plan?: No  Freedom of Choice List was Provided with Basic Dialogue that Supports the Patient's Individualized Plan of Care/Goals, Treatment Preferences and Shares the Quality Data Associated with the Providers?: No  [place] Resource Information Provided?: No  Discharge Location  Discharge Placement: Home with home health      Patient has designated ____unable____ to participate in his/her discharge plan and to receive any needed information.      Name:   Address:  Phone number:

## 2020-04-28 NOTE — ED NOTES
This nurse pulled another aspirin and amlodipine from hospitals because the first two were left in another covid room.

## 2020-04-28 NOTE — TELEPHONE ENCOUNTER
Called patient talked to wife she states patient was admitted to [place] [address] yesterday 4/27/20.

## 2020-04-28 NOTE — ED NOTES
pt found up in room by xray technician. Pt stated he dropped urinal, pulled out IV in attempt to urinate. Pt placed back in bed and on cardiac monitor. Pt resting in position of comfort. Call light provided to patient.
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
## 2020-04-28 NOTE — ED NOTES
Pt mouth breathing, difficulty keeping O2 sat >90%, pt placed on nrb. md gray notified, charge rn notified.
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
## 2020-04-28 NOTE — ED TRIAGE NOTES
Alert male arrives to ED with c/o elevated bs, cough. reports he fell out of bed approx 10 days ago hitting lt leg, evaluated by md acosta, pt reports he is now out of sorts. Pt reports difficulty holding objects.

## 2020-04-28 NOTE — PROGRESS NOTES
attempted to complete the initial Spiritual Assessment of the patient in bed 10 of the emergency room, and offer Pastoral Care support to the patient. 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 Patient is also here due to a change in mental status. Patient does not have any Jain/cultural needs that will affect patients preferences in health care. Chaplains will continue to follow and will provide pastoral care on an as needed/requested basis.     Karri Linares  Spiritual Care Department  [phone]

## 2020-04-28 NOTE — ED NOTES
Assumed care of patients. Pt alert and confused, on non-rebreather at 15 L, SPO2 96%. Respirations 22 even and unlabored, diminished lung sounds noted. Allergies verified, IV Rocephin administered as ordered. Pt transferred to CT via stretcher. Accompanied by this nurse, will continue to monitor.

## 2020-04-28 NOTE — ED NOTES
Pt in bed with eyes closed, resting quietly. Continues on BiPAP with SPO2 98%, on cardiac monitor with NSR noted, will continue to monitor pt.

## 2020-04-29 LAB
ANION GAP SERPL CALC-SCNC: 0 MMOL/L (ref 3–18)
ARTERIAL PATENCY WRIST A: YES
BASE EXCESS BLD CALC-SCNC: 13 MMOL/L
BASOPHILS # BLD: 0 K/UL (ref 0–0.1)
BASOPHILS NFR BLD: 0 % (ref 0–2)
BDY SITE: ABNORMAL
BODY TEMPERATURE: 96
BODY TEMPERATURE: 96.4
BODY TEMPERATURE: 97.6
BUN SERPL-MCNC: 33 MG/DL (ref 7–18)
BUN/CREAT SERPL: 41 (ref 12–20)
CALCIUM SERPL-MCNC: 7.4 MG/DL (ref 8.5–10.1)
CHLORIDE SERPL-SCNC: 99 MMOL/L (ref 100–111)
CO2 SERPL-SCNC: 37 MMOL/L (ref 21–32)
CREAT SERPL-MCNC: 0.81 MG/DL (ref 0.6–1.3)
DIFFERENTIAL METHOD BLD: ABNORMAL
EOSINOPHIL # BLD: 0.2 K/UL (ref 0–0.4)
EOSINOPHIL NFR BLD: 2 % (ref 0–5)
ERYTHROCYTE [DISTWIDTH] IN BLOOD BY AUTOMATED COUNT: 15 % (ref 11.6–14.5)
GAS FLOW.O2 O2 DELIVERY SYS: ABNORMAL L/MIN
GAS FLOW.O2 SETTING OXYMISER: 10 L/M
GLUCOSE BLD STRIP.AUTO-MCNC: 143 MG/DL (ref 70–110)
GLUCOSE BLD STRIP.AUTO-MCNC: 163 MG/DL (ref 70–110)
GLUCOSE BLD STRIP.AUTO-MCNC: 163 MG/DL (ref 70–110)
GLUCOSE BLD STRIP.AUTO-MCNC: 168 MG/DL (ref 70–110)
GLUCOSE SERPL-MCNC: 183 MG/DL (ref 74–99)
HCO3 BLD-SCNC: 39 MMOL/L (ref 22–26)
HCO3 BLD-SCNC: 39 MMOL/L (ref 22–26)
HCO3 BLD-SCNC: 40.3 MMOL/L (ref 22–26)
HCT VFR BLD AUTO: 45 % (ref 36–48)
HGB BLD-MCNC: 13.7 G/DL (ref 13–16)
LYMPHOCYTES # BLD: 1.8 K/UL (ref 0.9–3.6)
LYMPHOCYTES NFR BLD: 18 % (ref 21–52)
MCH RBC QN AUTO: 29.9 PG (ref 24–34)
MCHC RBC AUTO-ENTMCNC: 30.4 G/DL (ref 31–37)
MCV RBC AUTO: 98.3 FL (ref 74–97)
MONOCYTES # BLD: 1.2 K/UL (ref 0.05–1.2)
MONOCYTES NFR BLD: 12 % (ref 3–10)
NEUTS SEG # BLD: 6.6 K/UL (ref 1.8–8)
NEUTS SEG NFR BLD: 68 % (ref 40–73)
O2/TOTAL GAS SETTING VFR VENT: 40 %
O2/TOTAL GAS SETTING VFR VENT: 55 %
O2/TOTAL GAS SETTING VFR VENT: 60 %
PCO2 BLD: 66.3 MMHG (ref 35–45)
PCO2 BLD: 71.1 MMHG (ref 35–45)
PCO2 BLD: 85.5 MMHG (ref 35–45)
PEEP RESPIRATORY: 8 CMH2O
PEEP RESPIRATORY: 8 CMH2O
PH BLD: 7.28 [PH] (ref 7.35–7.45)
PH BLD: 7.34 [PH] (ref 7.35–7.45)
PH BLD: 7.37 [PH] (ref 7.35–7.45)
PIP ISTAT,IPIP: 18
PIP ISTAT,IPIP: 18
PLATELET # BLD AUTO: 235 K/UL (ref 135–420)
PMV BLD AUTO: 10.1 FL (ref 9.2–11.8)
PO2 BLD: 111 MMHG (ref 80–100)
PO2 BLD: 53 MMHG (ref 80–100)
PO2 BLD: 80 MMHG (ref 80–100)
POTASSIUM SERPL-SCNC: 4.1 MMOL/L (ref 3.5–5.5)
PRESSURE SUPPORT SETTING VENT: 10 CMH2O
PRESSURE SUPPORT SETTING VENT: 10 CMH2O
RBC # BLD AUTO: 4.58 M/UL (ref 4.7–5.5)
SAO2 % BLD: 85 % (ref 92–97)
SAO2 % BLD: 93 % (ref 92–97)
SAO2 % BLD: 98 % (ref 92–97)
SARS-COV-2, COV2NT: NOT DETECTED
SERVICE CMNT-IMP: ABNORMAL
SODIUM SERPL-SCNC: 136 MMOL/L (ref 136–145)
SPECIMEN TYPE: ABNORMAL
SPONTANEOUS TIMED, IST: YES
SPONTANEOUS TIMED, IST: YES
TOTAL RESP. RATE, ITRR: 16
TOTAL RESP. RATE, ITRR: 16
TOTAL RESP. RATE, ITRR: 24
WBC # BLD AUTO: 9.7 K/UL (ref 4.6–13.2)

## 2020-04-29 PROCEDURE — 80048 BASIC METABOLIC PNL TOTAL CA: CPT

## 2020-04-29 PROCEDURE — 74011636637 HC RX REV CODE- 636/637: Performed by: INTERNAL MEDICINE

## 2020-04-29 PROCEDURE — 74011250636 HC RX REV CODE- 250/636: Performed by: INTERNAL MEDICINE

## 2020-04-29 PROCEDURE — 74011000258 HC RX REV CODE- 258: Performed by: INTERNAL MEDICINE

## 2020-04-29 PROCEDURE — 74011250637 HC RX REV CODE- 250/637: Performed by: INTERNAL MEDICINE

## 2020-04-29 PROCEDURE — 82803 BLOOD GASES ANY COMBINATION: CPT

## 2020-04-29 PROCEDURE — 77030027138 HC INCENT SPIROMETER -A

## 2020-04-29 PROCEDURE — C9113 INJ PANTOPRAZOLE SODIUM, VIA: HCPCS | Performed by: INTERNAL MEDICINE

## 2020-04-29 PROCEDURE — 65660000001 HC RM ICU INTERMED STEPDOWN

## 2020-04-29 PROCEDURE — 87070 CULTURE OTHR SPECIMN AEROBIC: CPT

## 2020-04-29 PROCEDURE — 82962 GLUCOSE BLOOD TEST: CPT

## 2020-04-29 PROCEDURE — 36600 WITHDRAWAL OF ARTERIAL BLOOD: CPT

## 2020-04-29 PROCEDURE — 94762 N-INVAS EAR/PLS OXIMTRY CONT: CPT

## 2020-04-29 PROCEDURE — 77010033711 HC HIGH FLOW OXYGEN

## 2020-04-29 PROCEDURE — 74011250636 HC RX REV CODE- 250/636: Performed by: HOSPITALIST

## 2020-04-29 PROCEDURE — 94660 CPAP INITIATION&MGMT: CPT

## 2020-04-29 PROCEDURE — 85025 COMPLETE CBC W/AUTO DIFF WBC: CPT

## 2020-04-29 RX ADMIN — ACETAMINOPHEN 650 MG: 325 TABLET, FILM COATED ORAL at 14:00

## 2020-04-29 RX ADMIN — INSULIN LISPRO 2 UNITS: 100 INJECTION, SOLUTION INTRAVENOUS; SUBCUTANEOUS at 21:23

## 2020-04-29 RX ADMIN — CEFTRIAXONE 2 G: 2 INJECTION, POWDER, FOR SOLUTION INTRAMUSCULAR; INTRAVENOUS at 22:13

## 2020-04-29 RX ADMIN — MELATONIN 12 MG: at 21:24

## 2020-04-29 RX ADMIN — INSULIN LISPRO 2 UNITS: 100 INJECTION, SOLUTION INTRAVENOUS; SUBCUTANEOUS at 18:39

## 2020-04-29 RX ADMIN — ACETAMINOPHEN 650 MG: 325 TABLET, FILM COATED ORAL at 20:24

## 2020-04-29 RX ADMIN — PANTOPRAZOLE SODIUM 40 MG: 40 INJECTION, POWDER, FOR SOLUTION INTRAVENOUS at 06:25

## 2020-04-29 RX ADMIN — AZITHROMYCIN MONOHYDRATE 250 MG: 500 INJECTION, POWDER, LYOPHILIZED, FOR SOLUTION INTRAVENOUS at 23:51

## 2020-04-29 RX ADMIN — INSULIN LISPRO 2 UNITS: 100 INJECTION, SOLUTION INTRAVENOUS; SUBCUTANEOUS at 10:15

## 2020-04-29 NOTE — DISCHARGE INSTRUCTIONS
Your A1C  was   Lab Results   Component Value Date/Time    Hemoglobin A1c 10.6 (H) 04/28/2020 04:28 AM    Hemoglobin A1c (POC) 7.7 (A) 06/06/2019 08:57 AM     This lab test reflects that your blood sugar averaged  258 mg/dL over the past 3 months. It is important to follow up with your provider on a routine basis to continue to evaluate your blood sugar discuss any necessary changes in treatment.

## 2020-04-29 NOTE — PROGRESS NOTES
Verbal report received on pt who appears to be resting quietly, in no apparent discomfort. 2258: Telephone call received from pt's wife, pertinent questions answered, pt's wife reminded to bring pt's ear drop, she verbalized understanding.     0028: Pt resting quietly in bed, in no apparent discomfort, pt on high flow with rate decreased from 15L to 10L; tolerating well; O2 at 98%

## 2020-04-29 NOTE — PROGRESS NOTES
2130- Received patient on Airvo(high flow) nasal cannula 30L at 50%fio2. Switched patient to high flow salter nasal cannula @15L, Spo2 100%. HR 75, RR 16 BS clear bilaterally. Patient is alert and responsive eating dinner at this time. RT will continue to wean O2 and monitor respiratory status. 0032- O2 wean from 15L to 10L, Spo2 97%. 0515- Sputum sample obtain. 5541- Placed patient on BIPAP IPAP 18, EPAP, Rate 8 at 40%Fio2. RT explained to the patient due to increase of  PCO2 of 85 and will follow up with an ABG. Patient stated he never had a sleep study done or own a CPAP machine at home. 1- Dr. Pari Bennettr notified and aware of patient status.

## 2020-04-29 NOTE — PROGRESS NOTES
INTERIM UPDATE - [id number] EST on 4/29/2020    Respiratory Therapist reports that ABG returns this AM and shows Patient has pCO2 in 80s while on High Flow Oxygen. Patient switched to BiPAP and Respiratory Therapist reports Patient is tolerating BiPAP. Respiratory Therapist expresses some concern for Sleep Apnea. Plan:  Respiratory Therapist advised to repeat ABG in 1 hour to check for improvement of Hypercapnia.

## 2020-04-29 NOTE — PROGRESS NOTES
1230:  Patient received in room [id number], patient currently on BiPAP, tolerating well.      1400:  Per RT patient removed from BiPAP and placed on Nasal cannula. Tolerated well.    1625:  Patient O2 sats began to drop to low 30s. Patient O2 line was not connected to o2 wall concentrator. 1630:  O2 sats up to 88%    1645:  Patient placed on High flow; tolerating well.      1730:  Patient eating in room. 1900:  Bedside and Verbal shift change report given to Shaniqua Vo RN (oncoming nurse) by [facility] RN (offgoing nurse). Report included the following information SBAR, Intake/Output, Recent Results, Cardiac Rhythm NSR and Alarm Parameters .

## 2020-04-29 NOTE — PROGRESS NOTES
NIV   - On NIV > 8 hours with adjustments made to improve PO2   - Patient easily aroused by voice throughout use   - Patient observed to breath with machine set rate only at times     - patient stated he has not had sleep study or uses Cpap/Bipap at home   - signs of hypercapnia  With no COPD history per patient    - NIV use and needs discussed with patient with him appearing receptive to a possible sleep study    Off NIV at this time    - last ABG on unit     -  Results for Kash Farr (MRN [id number]) as of 4/29/2020 14:02   Ref. Range 4/29/2020 11:08   pH (POC) Latest Ref Range: 7.35 - 7.45   7.371   pCO2 (POC) Latest Ref Range: 35.0 - 45.0 MMHG 66.3 (H)   pO2 (POC) Latest Ref Range: 80 - 100 MMHG 111 (H)   HCO3 (POC) Latest Ref Range: 22 - 26 MMOL/L 39.0 (H)   sO2 (POC) Latest Ref Range: 92 - 97 % 98 (H)   Base excess (POC) Latest Units: mmol/L 13   FIO2 (POC) Latest Units: % 55   Patient temp. Latest Units:   96.0   Specimen type (POC) Latest Units:   ARTERIAL   Site Latest Units:   RIGHT RADIAL   Device: Latest Units:   BIPAP   Total resp. rate Latest Units:   24   PIP (POC) Latest Units:   18   PEEP/CPAP (POC) Latest Units: cmH2O 8   Pressure support Latest Units: cmH2O 10   Allens test (POC) Latest Units:   YES       Plan :  Observe off unit / O2 to keep SPO2 > 94% / Plan discussed with patient    - Per Dr Tiffany Mariee \" - Bicarb levels point to chronic hypercapnea, possible pickwickian?  \"

## 2020-04-29 NOTE — PROGRESS NOTES
0730  Assumed care of pt from KALE Vargas. Sleeping, on BIPAP mask, monitor reveals NSR, SpO2 100%. 0830  Telephone update to wife. 1000  Pt awake, denies c/o. ABG sl improved but remains hypercapneic and relatively hypoxic. 1400  Orders from Dr. Florina Valencia to remove BIPAP. Removed by RT and placed on n/c. Eating noon time meal, phoned wife. C/o discomfort LLE cellulitic area on lower leg with boggy induration and scab. Tylenol given po. Appetite good for noon time meal.  Advised BIPAP will be held x 60 minutes post prandial.  1600  Stable s/p removal of BIPAP, eupneic on O2 high flow 5 lpm.  1800  COVID-19 screen returned negative. Dr Florina Valencia updated, pt informed, isolation d/cd. 1900  Bedside and Verbal shift change report given to Duane Verma RN (oncoming nurse) by Hoa San RN (offgoing nurse). Report included the following information SBAR, Kardex, Intake/Output, MAR, Accordion, Recent Results, Cardiac Rhythm SR, Alarm Parameters  and Quality Measures.

## 2020-04-29 NOTE — DIABETES MGMT
NUTRITIONAL ASSESSMENT GLYCEMIC CONTROL/ PLAN OF CARE     Bal Brannon.           63 y.o.           4/27/2020                 1. Community acquired pneumonia of right lung, unspecified part of lung    2. Hypoxia    3. Cellulitis of left leg    4. SUSPECTED 2019 NOVEL CORONAVIRUS INFECTION    5. Acute kidney injury (nontraumatic) (HCC)    6. Acute hyperglycemia    7. Acute respiratory failure with hypoxia and hypercapnia (HCC)    T2DM   INTERVENTIONS/PLAN:   1. 1800 calorie CHO consistent diet to promote weight and glycemic control. 2. Monitor glycemic control, po intake, labs and weights. ASSESSMENT:   Nutritional Status:  Pt is 185% ideal weight; pt appears well nourished. Pt unable to take po earlier today due to  Bipap but took po this afternoon and it appears from chart review that he tolerated his meal.  Nutrition Diagnoses: Morbid obesity due to excess energy intake as evidenced by BMI - 45.2 kg/m2. Altered nutrition related labs due to T2DM as evidenced by A1C of 10.6%. Diabetes Management:   Recent blood glucose:    4/29:  163, 143  4/28:  238, 228, 261 - received 10 units corrective lispro  Within target range (non-ICU: <140; ICU<180): [x] Yes (4/29)   []  No  Current Insulin regimen:   Corrective lispro, normal insulin sensitivity ACHS  Home medication/insulin regimen: per chart:  Metformin 500 mg BID  Victoza 1.8 mg/d  HbA1c: 10.6% - ave BG ~ 258 mg/d Lover past 3 months  Adequate glycemic control PTA:  [] Yes  [x] No     SUBJECTIVE/OBJECTIVE:   Information obtained from: chart review, RN  Attempted to see pt at 1200 but he was on bipap. RN reports pt not having any po intake thus far today due to Bipap. Noted pt later took 100% lunch meal today (1415).     Diet: CHO consistent    Patient Vitals for the past 100 hrs:   % Diet Eaten   04/29/20 1415 100 %   04/29/20 1200 0 %   04/29/20 0800 0 %       Medications: [x]                Reviewed     Most Recent POC Glucose:   Recent Labs 04/29/20  0300 04/28/20  0429 04/27/20  2101   * 223* 295*      Labs:   Lab Results   Component Value Date/Time    Hemoglobin A1c 10.6 (H) 04/28/2020 04:28 AM     Lab Results   Component Value Date/Time    Hemoglobin A1c 10.6 (H) 04/28/2020 04:28 AM    Hemoglobin A1c 9.5 (H) 01/22/2020 10:10 AM    Hemoglobin A1c 8.6 (H) 07/27/2017 08:36 AM     Lab Results   Component Value Date/Time    Sodium 136 04/29/2020 03:00 AM    Potassium 4.1 04/29/2020 03:00 AM    Chloride 99 (L) 04/29/2020 03:00 AM    CO2 37 (H) 04/29/2020 03:00 AM    Anion gap 0 (L) 04/29/2020 03:00 AM    Glucose 183 (H) 04/29/2020 03:00 AM    BUN 33 (H) 04/29/2020 03:00 AM    Creatinine 0.81 04/29/2020 03:00 AM    Calcium 7.4 (L) 04/29/2020 03:00 AM    Albumin 3.1 (L) 04/28/2020 04:29 AM     Anthropometrics: IBW : 86.4 kg (190 lb 7.6 oz), % IBW (Calculated): 184.72 %, BMI (calculated): 45.2  Wt Readings from Last 1 Encounters:   04/29/20 (!) 159.6 kg (351 lb 13.7 oz)      Last Weight Metrics:  Weight Loss Metrics 4/29/2020 4/27/2020 4/23/2020 1/22/2020 10/16/2019 6/5/2019 3/7/2019   Today's Wt 351 lb 13.7 oz - 353 lb 350 lb 326 lb 12.8 oz 343 lb 6.4 oz 338 lb 3.2 oz   BMI - 45.18 kg/m2 45.32 kg/m2 44.94 kg/m2 41.96 kg/m2 44.09 kg/m2 43.42 kg/m2       Ht Readings from Last 1 Encounters:   04/29/20 6' 2\" (1.88 m)     Estimated Nutrition Needs:  1932 Kcals/day, Protein (g): 129 g Fluid (ml): 1900 ml  Based on:   [x]          Actual BW    []          ABW   []            Adjusted BW           Nutrition Interventions:  1800 calorie consistent CHO diet  Goal:   Blood glucose will be within target range of  mg/dL by 5/2/20. Pt will be aware of meal planning guidelines by 5/5/20.         Nutrition Monitoring and Evaluation      []     Monitor po intake on meal rounds  [x]     Continue inpatient monitoring and intervention  []     Other:    Nutrition Risk:  []   High     [x]  Moderate    []  Minimal/Uncompromised    Sterling Lam RD, CDE   Office: [address] [place] [address] Pager:  [phone]

## 2020-04-29 NOTE — PROGRESS NOTES
Called to give patient's wife an update on his current state. She had no further questions and was pleased with his progress.

## 2020-04-29 NOTE — PROGRESS NOTES
Internal Medicine Progress Note    Patient's Name: Stewart Perdomo. Admit Date: 4/27/2020  Length of Stay: 1      Assessment/Plan     Active Hospital Problems    Diagnosis Date Noted    Community acquired pneumonia 04/28/2020    Acute respiratory failure with hypoxia and hypercapnia (HCC) 04/28/2020    Multifocal pneumonia 04/28/2020    Stage 1 acute kidney injury ([facility] 71/97/8738    Metabolic encephalopathy 37/03/4299    Sepsis ([facility] 04/28/2020    [facility] 04/23/2020    Controlled type 2 diabetes mellitus without complication, without long-term current use of insulin ([facility] 11/07/2018    Obesity, morbid ([facility] 12/14/2017    Essential hypertension, malignant 09/18/2013    CAD (coronary artery disease) 05/09/2011    Hypercholesterolemia      - Afebrile, normal white count  - Cont IVAB  - F/u cult  - Trend CBC  - CoVID-19 pending  - Bicarb levels point to chronic hypercapnea, possible pickwickian?  - Elevated BNP on arrival, possible pulm edema on imaging  - Check echo  - Cr now WNL  - Trend BMP  - Cont acceptable home medications for chronic conditions   - DVT protocol    I have personally reviewed all pertinent labs and films that have officially resulted over the last 24 hours. I have personally checked for all pending labs that are awaiting final results.     Subjective     Pt s/e @ bedside  No major events overnight  Back on BiPAP for hypercapnea early AM  Doing ok and states no complaints at this time  Denies CP or SOB    Objective     Visit Vitals  /77   Pulse 62   Temp 96 °F (35.6 °C)   Resp 15   Ht 6' 2\" (1.88 m)   Wt (!) 159.6 kg (351 lb 13.7 oz)   SpO2 100%   BMI 45.18 kg/m²       Physical Exam:  General Appearance: NAD, conversant on BiPAP  Lungs: Decreased BS with normal respiratory effort  CV: RRR, no m/r/g  Abdomen: soft, non-tender, normal bowel sounds  Extremities: no cyanosis, trace peripheral edema  Neuro: No focal deficits, motor/sensory intact    Lab/Data Reviewed:  CMP:   Lab Results   Component Value Date/Time     04/29/2020 03:00 AM    K 4.1 04/29/2020 03:00 AM    CL 99 (L) 04/29/2020 03:00 AM    CO2 37 (H) 04/29/2020 03:00 AM    AGAP 0 (L) 04/29/2020 03:00 AM     (H) 04/29/2020 03:00 AM    BUN 33 (H) 04/29/2020 03:00 AM    CREA 0.81 04/29/2020 03:00 AM    GFRAA >60 04/29/2020 03:00 AM    GFRNA >60 04/29/2020 03:00 AM    CA 7.4 (L) 04/29/2020 03:00 AM     CBC:   Lab Results   Component Value Date/Time    WBC 9.7 04/29/2020 03:00 AM    HGB 13.7 04/29/2020 03:00 AM    HCT 45.0 04/29/2020 03:00 AM     04/29/2020 03:00 AM       Imaging Reviewed:  No results found.     Medications Reviewed:  Current Facility-Administered Medications   Medication Dose Route Frequency    azithromycin (ZITHROMAX) 250 mg in 0.9% sodium chloride 250 mL IVPB  250 mg IntraVENous Q24H    amLODIPine (NORVASC) tablet 5 mg  5 mg Oral DAILY    aspirin chewable tablet 81 mg  81 mg Oral DAILY    ondansetron (ZOFRAN) injection 4 mg  4 mg IntraVENous Q4H PRN    insulin lispro (HUMALOG) injection   SubCUTAneous AC&HS    glucose chewable tablet 16 g  4 Tab Oral PRN    glucagon (GLUCAGEN) injection 1 mg  1 mg IntraMUSCular PRN    dextrose 10% infusion 125-250 mL  125-250 mL IntraVENous PRN    acetaminophen (TYLENOL) tablet 650 mg  650 mg Oral Q6H PRN    melatonin tablet 12 mg  12 mg Oral QHS PRN    docusate sodium (COLACE) capsule 100 mg  100 mg Oral BID PRN    pantoprazole (PROTONIX) injection 40 mg  40 mg IntraVENous Q24H    hydrocortisone-acetic acid (VOSOL-HC) 1-2 % otic solution 2 Drop (Patient Supplied)  2 Drop Both Ears BID    sodium chloride (NS) flush 5-10 mL  5-10 mL IntraVENous PRN    cefTRIAXone (ROCEPHIN) 2 g in 0.9% sodium chloride (MBP/ADV) 50 mL MBP  2 g IntraVENous Q24H           Lorrayne Ground, DO  Internal Medicine, Hospitalist  Pager: [phone]  [address] [place] [address] Group

## 2020-04-29 NOTE — PROGRESS NOTES
Problem: Diabetes Self-Management  Goal: *Disease process and treatment process  Description: Define diabetes and identify own type of diabetes; list 3 options for treating diabetes. Outcome: Progressing Towards Goal  Goal: *Incorporating nutritional management into lifestyle  Description: Describe effect of type, amount and timing of food on blood glucose; list 3 methods for planning meals. Outcome: Progressing Towards Goal  Goal: *Incorporating physical activity into lifestyle  Description: State effect of exercise on blood glucose levels. Outcome: Progressing Towards Goal  Goal: *Developing strategies to promote health/change behavior  Description: Define the ABC's of diabetes; identify appropriate screenings, schedule and personal plan for screenings. Outcome: Progressing Towards Goal  Goal: *Using medications safely  Description: State effect of diabetes medications on diabetes; name diabetes medication taking, action and side effects. Outcome: Progressing Towards Goal  Goal: *Monitoring blood glucose, interpreting and using results  Description: Identify recommended blood glucose targets  and personal targets. Outcome: Progressing Towards Goal  Goal: *Prevention, detection, treatment of acute complications  Description: List symptoms of hyper- and hypoglycemia; describe how to treat low blood sugar and actions for lowering  high blood glucose level. Outcome: Progressing Towards Goal  Goal: *Prevention, detection and treatment of chronic complications  Description: Define the natural course of diabetes and describe the relationship of blood glucose levels to long term complications of diabetes.   Outcome: Progressing Towards Goal  Goal: *Developing strategies to address psychosocial issues  Description: Describe feelings about living with diabetes; identify support needed and support network  Outcome: Progressing Towards Goal  Goal: *Patient Specific Goal (EDIT GOAL, INSERT TEXT)  Outcome: Progressing Towards Goal     Problem: Patient Education: Go to Patient Education Activity  Goal: Patient/Family Education  Outcome: Progressing Towards Goal     Problem: Pressure Injury - Risk of  Goal: *Prevention of pressure injury  Description: Document Dominick Scale and appropriate interventions in the flowsheet. Outcome: Progressing Towards Goal  Note: Pressure Injury Interventions:  Sensory Interventions: Check visual cues for pain    Moisture Interventions: Absorbent underpads, Check for incontinence Q2 hours and as needed    Activity Interventions: Pressure redistribution bed/mattress(bed type)    Mobility Interventions: HOB 30 degrees or less, Pressure redistribution bed/mattress (bed type)    Nutrition Interventions: Document food/fluid/supplement intake, Offer support with meals,snacks and hydration    Friction and Shear Interventions: HOB 30 degrees or less    Problem: Patient Education: Go to Patient Education Activity  Goal: Patient/Family Education  Outcome: Progressing Towards Goal     Problem: Discharge Planning  Goal: *Discharge to safe environment  Outcome: Progressing Towards Goal     Problem: Falls - Risk of  Goal: *Absence of Falls  Description: Document Tom Fall Risk and appropriate interventions in the flowsheet.   Outcome: Progressing Towards Goal  Note: Fall Risk Interventions:  Mobility Interventions: Communicate number of staff needed for ambulation/transfer, Patient to call before getting OOB      Medication Interventions: Bed/chair exit alarm, Evaluate medications/consider consulting pharmacy, Patient to call before getting OOB, Teach patient to arise slowly    Elimination Interventions: Call light in reach, Patient to call for help with toileting needs, Toilet paper/wipes in reach, Urinal in reach    History of Falls Interventions: Bed/chair exit alarm, Door open when patient unattended, Evaluate medications/consider consulting pharmacy    Problem: Patient Education: Go to Patient Education Activity  Goal: Patient/Family Education  Outcome: Progressing Towards Goal     Problem: Pain  Goal: *Control of Pain  Outcome: Progressing Towards Goal     Problem: Patient Education: Go to Patient Education Activity  Goal: Patient/Family Education  Outcome: Progressing Towards Goal

## 2020-04-29 NOTE — PROGRESS NOTES
Respiratory Therapy Assessment Care Plan    Patient:  Reynold Hawkins. 63 y.o. male 4/29/2020 7:06 AM    Acute respiratory failure with hypoxia and hypercapnia ([facility] [J96.01, J96.02]  Stage 1 acute kidney injury ([facility] [N17.9]  Multifocal pneumonia [J18.9]  Community acquired pneumonia [J18.9]      Chest X-RAY:   Results from Hospital Encounter encounter on 04/27/20   XR CHEST PORT    Impression IMPRESSION:    Right perihilar/suprahilar asymmetrical edema versus pneumonia. Left perihilar  atelectasis versus streaky pneumonia. Mildly prominent cardiac silhouette. Results from [place] encounter on 04/28/15   XR HAND RT MIN 3 V    Impression IMPRESSION:     Degenerative changes involving the carpal bones, 2nd metacarpophalangeal  articulation and the 3rd metacarpophalangeal articulation.        Results from Hospital Encounter encounter on 02/27/12   XR CHEST PA AND LATERAL          Vital Signs:     Visit Vitals  /68   Pulse 63   Temp 96.4 °F (35.8 °C)   Resp 14   Ht 6' 2\" (1.88 m)   Wt (!) 161 kg (355 lb)   SpO2 97%   BMI 45.58 kg/m²         Indications for treatment: Hypercapnia with alter mental status    Plan of care: Support ventilation with NIV to reduce pCO2 / O2 to support hypoxia / Repeat ABG following placement on NIV        Goal: Reduced need for NIV / Assess for continued use of NIV for patient benefit / Educate patient to need and use

## 2020-04-29 NOTE — PROGRESS NOTES
Pt unable to provide sputum specimen at this moment, pt made aware of specimen, he is not coughing at the moment. RT made aware as well, for assistance. 1398: RT able to collect sputum sample from pt, specimen sent to lab    5507: Bedside shift change report given to Rianna Tovar RN (oncoming nurse) by Rory Herrera RN (offgoing nurse).  Report included the following information SBAR, Procedure Summary, Intake/Output, MAR, Recent Results and Cardiac Rhythm SR.

## 2020-04-30 ENCOUNTER — APPOINTMENT (OUTPATIENT)
Dept: NON INVASIVE DIAGNOSTICS | Age: 60
DRG: 871 | End: 2020-04-30
Attending: HOSPITALIST
Payer: COMMERCIAL

## 2020-04-30 LAB
BASOPHILS # BLD: 0 K/UL (ref 0–0.1)
BASOPHILS NFR BLD: 0 % (ref 0–2)
DIFFERENTIAL METHOD BLD: ABNORMAL
ECHO TV REGURGITANT MAX VELOCITY: 294 CM/S
ECHO TV REGURGITANT PEAK GRADIENT: 34.5 MMHG
EOSINOPHIL # BLD: 0.2 K/UL (ref 0–0.4)
EOSINOPHIL NFR BLD: 2 % (ref 0–5)
ERYTHROCYTE [DISTWIDTH] IN BLOOD BY AUTOMATED COUNT: 14.9 % (ref 11.6–14.5)
GLUCOSE BLD STRIP.AUTO-MCNC: 115 MG/DL (ref 70–110)
GLUCOSE BLD STRIP.AUTO-MCNC: 121 MG/DL (ref 70–110)
GLUCOSE BLD STRIP.AUTO-MCNC: 139 MG/DL (ref 70–110)
GLUCOSE BLD STRIP.AUTO-MCNC: 181 MG/DL (ref 70–110)
HCT VFR BLD AUTO: 46.5 % (ref 36–48)
HGB BLD-MCNC: 14.3 G/DL (ref 13–16)
LYMPHOCYTES # BLD: 1.6 K/UL (ref 0.9–3.6)
LYMPHOCYTES NFR BLD: 22 % (ref 21–52)
MCH RBC QN AUTO: 29.8 PG (ref 24–34)
MCHC RBC AUTO-ENTMCNC: 30.8 G/DL (ref 31–37)
MCV RBC AUTO: 96.9 FL (ref 74–97)
MONOCYTES # BLD: 0.9 K/UL (ref 0.05–1.2)
MONOCYTES NFR BLD: 13 % (ref 3–10)
NEUTS SEG # BLD: 4.6 K/UL (ref 1.8–8)
NEUTS SEG NFR BLD: 63 % (ref 40–73)
PLATELET # BLD AUTO: 245 K/UL (ref 135–420)
PMV BLD AUTO: 10 FL (ref 9.2–11.8)
RBC # BLD AUTO: 4.8 M/UL (ref 4.7–5.5)
WBC # BLD AUTO: 7.3 K/UL (ref 4.6–13.2)

## 2020-04-30 PROCEDURE — 85025 COMPLETE CBC W/AUTO DIFF WBC: CPT

## 2020-04-30 PROCEDURE — 82962 GLUCOSE BLOOD TEST: CPT

## 2020-04-30 PROCEDURE — 74011250636 HC RX REV CODE- 250/636: Performed by: HOSPITALIST

## 2020-04-30 PROCEDURE — 74011000250 HC RX REV CODE- 250: Performed by: HOSPITALIST

## 2020-04-30 PROCEDURE — 77010033711 HC HIGH FLOW OXYGEN

## 2020-04-30 PROCEDURE — 74011250636 HC RX REV CODE- 250/636: Performed by: INTERNAL MEDICINE

## 2020-04-30 PROCEDURE — 65660000000 HC RM CCU STEPDOWN

## 2020-04-30 PROCEDURE — 74011636637 HC RX REV CODE- 636/637: Performed by: INTERNAL MEDICINE

## 2020-04-30 PROCEDURE — 74011000258 HC RX REV CODE- 258: Performed by: INTERNAL MEDICINE

## 2020-04-30 PROCEDURE — 94660 CPAP INITIATION&MGMT: CPT

## 2020-04-30 PROCEDURE — C8929 TTE W OR WO FOL WCON,DOPPLER: HCPCS

## 2020-04-30 PROCEDURE — 77030021352 HC CBL LD SYS DISP COVD -B

## 2020-04-30 PROCEDURE — 74011250637 HC RX REV CODE- 250/637: Performed by: INTERNAL MEDICINE

## 2020-04-30 PROCEDURE — C9113 INJ PANTOPRAZOLE SODIUM, VIA: HCPCS | Performed by: INTERNAL MEDICINE

## 2020-04-30 RX ADMIN — CEFTRIAXONE 2 G: 2 INJECTION, POWDER, FOR SOLUTION INTRAMUSCULAR; INTRAVENOUS at 23:34

## 2020-04-30 RX ADMIN — AMLODIPINE BESYLATE 5 MG: 5 TABLET ORAL at 08:36

## 2020-04-30 RX ADMIN — PANTOPRAZOLE SODIUM 40 MG: 40 INJECTION, POWDER, FOR SOLUTION INTRAVENOUS at 05:11

## 2020-04-30 RX ADMIN — INSULIN LISPRO 2 UNITS: 100 INJECTION, SOLUTION INTRAVENOUS; SUBCUTANEOUS at 13:15

## 2020-04-30 RX ADMIN — PERFLUTREN 1 ML: 6.52 INJECTION, SUSPENSION INTRAVENOUS at 09:55

## 2020-04-30 RX ADMIN — ASPIRIN 81 MG CHEWABLE TABLET 81 MG: 81 TABLET CHEWABLE at 08:36

## 2020-04-30 RX ADMIN — ACETAMINOPHEN 650 MG: 325 TABLET, FILM COATED ORAL at 13:21

## 2020-04-30 NOTE — PROGRESS NOTES
Internal Medicine Progress Note    Patient's Name: Uli Grajeda. Admit Date: 4/27/2020  Length of Stay: 2      Assessment/Plan     Active Hospital Problems    Diagnosis Date Noted    Community acquired pneumonia 04/28/2020    Acute respiratory failure with hypoxia and hypercapnia (HCC) 04/28/2020    Multifocal pneumonia 04/28/2020    Stage 1 acute kidney injury ([facility] 90/88/6853    Metabolic encephalopathy 50/46/2339    Sepsis ([facility] 04/28/2020    Peripheral vascular disease ([facility] 04/23/2020    Controlled type 2 diabetes mellitus without complication, without long-term current use of insulin ([facility] 11/07/2018    Obesity, morbid ([facility] 12/14/2017    Essential hypertension, malignant 09/18/2013    CAD (coronary artery disease) 05/09/2011    Hypercholesterolemia      - Afebrile, normal white count  - Cont IVAB  - Cult pending  - Trend CBC  - CoVID-19 neg  - Bicarb levels point to chronic hypercapnea, possible pickwickian?  - Elevated BNP on arrival, possible pulm edema on imaging  - Echo pending  - Cr now WNL  - Trend BMP  - Transfer to tele  - Cont acceptable home medications for chronic conditions   - DVT protocol    I have personally reviewed all pertinent labs and films that have officially resulted over the last 24 hours. I have personally checked for all pending labs that are awaiting final results.     Subjective     Pt s/e @ bedside  No major events overnight  Doing well  Denies CP or SOB    Objective     Visit Vitals  /75   Pulse 76   Temp 98.2 °F (36.8 °C)   Resp 16   Ht 6' 2\" (1.88 m)   Wt (!) 159.2 kg (351 lb)   SpO2 99%   BMI 45.07 kg/m²       Physical Exam:  General Appearance: NAD, conversant  Lungs: Decreased BS with normal respiratory effort  CV: RRR, no m/r/g  Abdomen: soft, non-tender, normal bowel sounds  Extremities: no cyanosis, +1 B/L LE peripheral edema, LLE ecchymoses around thigh/knee  Neuro: No focal deficits, motor/sensory intact    Lab/Data Reviewed:  CMP:   No results found for: NA, K, CL, CO2, AGAP, GLU, BUN, CREA, GFRAA, GFRNA, CA, MG, PHOS, ALB, TBIL, TP, ALB, GLOB, AGRAT, SGOT, ALT, GPT  CBC:   Lab Results   Component Value Date/Time    WBC 7.3 04/30/2020 05:15 AM    HGB 14.3 04/30/2020 05:15 AM    HCT 46.5 04/30/2020 05:15 AM     04/30/2020 05:15 AM       Imaging Reviewed:  No results found.     Medications Reviewed:  Current Facility-Administered Medications   Medication Dose Route Frequency    azithromycin (ZITHROMAX) 250 mg in 0.9% sodium chloride 250 mL IVPB  250 mg IntraVENous Q24H    amLODIPine (NORVASC) tablet 5 mg  5 mg Oral DAILY    aspirin chewable tablet 81 mg  81 mg Oral DAILY    ondansetron (ZOFRAN) injection 4 mg  4 mg IntraVENous Q4H PRN    insulin lispro (HUMALOG) injection   SubCUTAneous AC&HS    glucose chewable tablet 16 g  4 Tab Oral PRN    glucagon (GLUCAGEN) injection 1 mg  1 mg IntraMUSCular PRN    dextrose 10% infusion 125-250 mL  125-250 mL IntraVENous PRN    acetaminophen (TYLENOL) tablet 650 mg  650 mg Oral Q6H PRN    melatonin tablet 12 mg  12 mg Oral QHS PRN    docusate sodium (COLACE) capsule 100 mg  100 mg Oral BID PRN    pantoprazole (PROTONIX) injection 40 mg  40 mg IntraVENous Q24H    hydrocortisone-acetic acid (VOSOL-HC) 1-2 % otic solution 2 Drop (Patient Supplied)  2 Drop Both Ears BID    sodium chloride (NS) flush 5-10 mL  5-10 mL IntraVENous PRN    cefTRIAXone (ROCEPHIN) 2 g in 0.9% sodium chloride (MBP/ADV) 50 mL MBP  2 g IntraVENous Q24H           Mya Osorio DO  Internal Medicine, Hospitalist  Pager: [phone]  [address] [place] [address]

## 2020-04-30 NOTE — PROGRESS NOTES
Problem: Discharge Planning  Goal: *Discharge to safe environment  Outcome: Progressing Towards Goal   Plan is Home with [address]

## 2020-04-30 NOTE — PROGRESS NOTES
Bedside shift change report given to Medhat Saab (oncoming nurse) by Jemma Person RN (offgoing nurse). Report included the following information SBAR, Kardex, Procedure Summary, Intake/Output, MAR, Accordion, Recent Results, Med Rec Status, Cardiac Rhythm NSR, Alarm Parameters , Quality Measures and Dual Neuro Assessment. 2000 Assessment. A&Ox4. Scab just below L knee with increased rigidity around site, warm to touch, 5/10 pain, temp 99.3 orally. Gave 650mg tylenol. Unable to assess genitals, PT refused. Gave 1 frozen meal, finished 100%. 2100 Pain reassessment. 0/10 in L leg.     2200 Resting quietly at this time. 0000 Assessment. No changes. 0400 Assessment. No Changes. 6085 PT wife updated on condition. 0700 Bedside shift change report given to Chrissie HENLEY (oncoming nurse) by Medhat Saab (offgoing nurse). Report included the following information SBAR, Kardex, Procedure Summary, Intake/Output, MAR, Accordion, Recent Results, Med Rec Status, Cardiac Rhythm NSR, Alarm Parameters , Quality Measures and Dual Neuro Assessment.

## 2020-04-30 NOTE — PROGRESS NOTES
TRANSFER - OUT REPORT:    Verbal report given to [facility], RN (name) on Jossue Ache.  being transferred to 2200 (unit) for routine progression of care       Report consisted of patients Situation, Background, Assessment and   Recommendations(SBAR). Information from the following report(s) SBAR, Procedure Summary, Intake/Output and MAR was reviewed with the receiving nurse. Lines:   Peripheral IV 04/27/20 Left Antecubital (Active)   Site Assessment Clean, dry, & intact 4/30/2020  8:00 AM   Phlebitis Assessment 0 4/30/2020  8:00 AM   Infiltration Assessment 0 4/30/2020  8:00 AM   Dressing Status Clean, dry, & intact 4/30/2020  8:00 AM   Dressing Type Tape;Transparent 4/30/2020  8:00 AM   Hub Color/Line Status Infusing 4/30/2020  8:00 AM   Action Taken Open ports on tubing capped 4/30/2020  8:00 AM   Alcohol Cap Used Yes 4/30/2020  8:00 AM        Opportunity for questions and clarification was provided. Patient transported with:   Registered Nurse     Report given to RN, all questions and concerns addressed, will continue to monitor pt.

## 2020-04-30 NOTE — PROGRESS NOTES
04/30/20 0800   Vitals   Temp 98.2 °F (36.8 °C)   Temp Source Oral   Pulse (Heart Rate) 65   Heart Rate Source Monitor   Resp Rate 15   O2 Sat (%) 96 %   Level of Consciousness Alert   /71   MAP (Monitor) 84   BP 1 Location Right arm   BP 1 Method Automatic   BP Patient Position At rest   Cardiac Rhythm NSR   MEWS Score 1   Box Number 2708   Electrodes Replaced Yes   Assumed care for the patient, AXO 4, follows command, no complain at this time will continue to monitor pt.

## 2020-04-30 NOTE — PROGRESS NOTES
NIV   - patient used bipap > 6 hours   - encouraged to use for his self assessment of tolerance    - possible need for sleep study   - patient stated he had study years ago while working for [employer] and believes he does have a little sleep apnea

## 2020-05-01 ENCOUNTER — TELEPHONE (OUTPATIENT)
Dept: FAMILY MEDICINE CLINIC | Age: 60
End: 2020-05-01

## 2020-05-01 VITALS
HEIGHT: 74 IN | BODY MASS INDEX: 40.43 KG/M2 | SYSTOLIC BLOOD PRESSURE: 132 MMHG | TEMPERATURE: 98.2 F | DIASTOLIC BLOOD PRESSURE: 59 MMHG | WEIGHT: 315 LBS | HEART RATE: 82 BPM | RESPIRATION RATE: 18 BRPM | OXYGEN SATURATION: 93 %

## 2020-05-01 DIAGNOSIS — B35.1 ONYCHOMYCOSIS: ICD-10-CM

## 2020-05-01 LAB
BACTERIA SPEC CULT: NORMAL
GLUCOSE BLD STRIP.AUTO-MCNC: 125 MG/DL (ref 70–110)
GLUCOSE BLD STRIP.AUTO-MCNC: 126 MG/DL (ref 70–110)
GLUCOSE BLD STRIP.AUTO-MCNC: 182 MG/DL (ref 70–110)
GRAM STN SPEC: NORMAL
SERVICE CMNT-IMP: NORMAL

## 2020-05-01 PROCEDURE — 74011250636 HC RX REV CODE- 250/636: Performed by: INTERNAL MEDICINE

## 2020-05-01 PROCEDURE — 74011250636 HC RX REV CODE- 250/636: Performed by: HOSPITALIST

## 2020-05-01 PROCEDURE — 74011636637 HC RX REV CODE- 636/637: Performed by: INTERNAL MEDICINE

## 2020-05-01 PROCEDURE — C9113 INJ PANTOPRAZOLE SODIUM, VIA: HCPCS | Performed by: INTERNAL MEDICINE

## 2020-05-01 PROCEDURE — 74011250637 HC RX REV CODE- 250/637: Performed by: INTERNAL MEDICINE

## 2020-05-01 PROCEDURE — 82962 GLUCOSE BLOOD TEST: CPT

## 2020-05-01 RX ORDER — DOXYCYCLINE 100 MG/1
100 CAPSULE ORAL 2 TIMES DAILY
Qty: 10 CAP | Refills: 0 | Status: SHIPPED | OUTPATIENT
Start: 2020-05-01 | End: 2020-05-06

## 2020-05-01 RX ORDER — DOXYCYCLINE 100 MG/1
100 CAPSULE ORAL 2 TIMES DAILY
Qty: 10 CAP | Refills: 0 | Status: SHIPPED | OUTPATIENT
Start: 2020-05-01 | End: 2020-05-01 | Stop reason: SDUPTHER

## 2020-05-01 RX ADMIN — INSULIN LISPRO 2 UNITS: 100 INJECTION, SOLUTION INTRAVENOUS; SUBCUTANEOUS at 12:54

## 2020-05-01 RX ADMIN — AZITHROMYCIN MONOHYDRATE 250 MG: 500 INJECTION, POWDER, LYOPHILIZED, FOR SOLUTION INTRAVENOUS at 03:00

## 2020-05-01 RX ADMIN — PANTOPRAZOLE SODIUM 40 MG: 40 INJECTION, POWDER, FOR SOLUTION INTRAVENOUS at 05:50

## 2020-05-01 RX ADMIN — ASPIRIN 81 MG CHEWABLE TABLET 81 MG: 81 TABLET CHEWABLE at 08:44

## 2020-05-01 RX ADMIN — AMLODIPINE BESYLATE 5 MG: 5 TABLET ORAL at 08:43

## 2020-05-01 NOTE — ROUTINE PROCESS
1630-received, patient from ICU, oriented to room, assessment completed, no distress noted, call bell within reach. Tele-box applied. 1830-resting quietly. 1930-Bedside and Verbal shift change report given to Tolu Koroma (oncoming nurse) by Kahlil Novak (offgoing nurse). Report included the following information SBAR, MAR and Recent Results.

## 2020-05-01 NOTE — ANCILLARY DISCHARGE INSTRUCTIONS
Core Measures Patient, Patient has virtual visit with Dr. Brenden Sevilla on 5/05/2020 at 3:45pm, office will call at 3:30pm to set up appointment.

## 2020-05-01 NOTE — PROGRESS NOTES
[address] pt care from [place], [facility]. Pt in bed, alert and oriented x 4. Not in any form of distress. Denies pain. Frequent use items and call bell within reach. Verbalized understanding to call for assistance. Bed locked in lowest position.

## 2020-05-01 NOTE — PROGRESS NOTES
0730 Rec'd patient up OOB in bedside chair awake alert and oriented denies pain assesed without CP no SOB. LLE erythema r/t cellulitis Admitted with mass to L. leg r/t fall.    Plan d/c   1050 Seen by MD  Plan d/c home

## 2020-05-01 NOTE — PROGRESS NOTES
Problem: Diabetes Self-Management  Goal: *Disease process and treatment process  Description: Define diabetes and identify own type of diabetes; list 3 options for treating diabetes. Outcome: Progressing Towards Goal  Goal: *Incorporating nutritional management into lifestyle  Description: Describe effect of type, amount and timing of food on blood glucose; list 3 methods for planning meals. Outcome: Progressing Towards Goal  Goal: *Incorporating physical activity into lifestyle  Description: State effect of exercise on blood glucose levels. Outcome: Progressing Towards Goal  Goal: *Developing strategies to promote health/change behavior  Description: Define the ABC's of diabetes; identify appropriate screenings, schedule and personal plan for screenings. Outcome: Progressing Towards Goal  Goal: *Using medications safely  Description: State effect of diabetes medications on diabetes; name diabetes medication taking, action and side effects. Outcome: Progressing Towards Goal  Goal: *Monitoring blood glucose, interpreting and using results  Description: Identify recommended blood glucose targets  and personal targets. Outcome: Progressing Towards Goal  Goal: *Prevention, detection, treatment of acute complications  Description: List symptoms of hyper- and hypoglycemia; describe how to treat low blood sugar and actions for lowering  high blood glucose level. Outcome: Progressing Towards Goal  Goal: *Prevention, detection and treatment of chronic complications  Description: Define the natural course of diabetes and describe the relationship of blood glucose levels to long term complications of diabetes.   Outcome: Progressing Towards Goal  Goal: *Developing strategies to address psychosocial issues  Description: Describe feelings about living with diabetes; identify support needed and support network  Outcome: Progressing Towards Goal  Goal: *Insulin pump training  Outcome: Progressing Towards Goal  Goal: *Sick day guidelines  Outcome: Progressing Towards Goal  Goal: *Patient Specific Goal (EDIT GOAL, INSERT TEXT)  Outcome: Progressing Towards Goal     Problem: Pressure Injury - Risk of  Goal: *Prevention of pressure injury  Description: Document Dominick Scale and appropriate interventions in the flowsheet. Outcome: Progressing Towards Goal  Note: Pressure Injury Interventions:  Sensory Interventions: Pressure redistribution bed/mattress (bed type), Assess changes in LOC, Avoid rigorous massage over bony prominences, Check visual cues for pain    Moisture Interventions: Apply protective barrier, creams and emollients    Activity Interventions: Assess need for specialty bed    Mobility Interventions: Pressure redistribution bed/mattress (bed type)    Nutrition Interventions: Document food/fluid/supplement intake    Friction and Shear Interventions: Apply protective barrier, creams and emollients                Problem: Patient Education: Go to Patient Education Activity  Goal: Patient/Family Education  Outcome: Progressing Towards Goal     Problem: Discharge Planning  Goal: *Discharge to safe environment  Outcome: Progressing Towards Goal     Problem: Falls - Risk of  Goal: *Absence of Falls  Description: Document Tom Fall Risk and appropriate interventions in the flowsheet.   Outcome: Progressing Towards Goal  Note: Fall Risk Interventions:  Mobility Interventions: Bed/chair exit alarm         Medication Interventions: Bed/chair exit alarm    Elimination Interventions: Bed/chair exit alarm, Call light in reach    History of Falls Interventions: Room close to nurse's station, Bed/chair exit alarm

## 2020-05-01 NOTE — PROGRESS NOTES
Discharge/Transition Planning    Care Management following and chart reviewed. Pt COVID 19 testing came back neg. Seems to progressing. Plan is Home and [place] if medically needed. Pt was walking all over went down at d/c and stating he works here. Does not need HH. Plan Home    Care Management Interventions  PCP Verified by CM: Yes(virtual visit with Dr Snow Warner yesterday)  Last Visit to PCP: 04/27/20  Palliative Care Criteria Met (RRAT>21 & CHF Dx)?: No  Mode of Transport at Discharge:  Other (see comment)(family)  Transition of Care Consult (CM Consult): Discharge Planning  Physical Therapy Consult: No  Occupational Therapy Consult: No  Speech Therapy Consult: No  Current Support Network: Lives with Spouse  Confirm Follow Up Transport: Self  The Plan for Transition of Care is Related to the Following Treatment Goals : Home with [address] [place] [address]  The Patient and/or Patient Representative was Provided with a Choice of Provider and Agrees with the Discharge Plan?: No  Freedom of Choice List was Provided with Basic Dialogue that Supports the Patient's Individualized Plan of Care/Goals, Treatment Preferences and Shares the Quality Data Associated with the Providers?: No   Resource Information Provided?: No  Discharge Location  Discharge Placement: Home    Jazlyn Jackson RN BSN  Outcomes Manager  Pager # [phone]

## 2020-05-01 NOTE — PROGRESS NOTES
Patient does not want to use Resmed Bipap tonight . RN aware HR76 Oxygen Sat 96% on 2lpm via nasal cannula.
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
## 2020-05-01 NOTE — PROGRESS NOTES
Rec'd awake alert sitting in bedside chair . Denies chest pain  No SOB  VS stable   D/c instructions to patient  Verbalized understanding .  RX called into [facility] per patient request . PIV removed no s/s infiltration   1310 escorted out via W/C with staff

## 2020-05-01 NOTE — DIABETES MGMT
Glycemic Control:   Lab Results   Component Value Date/Time    Hemoglobin A1c 10.6 (H) 04/28/2020 04:28 AM    Hemoglobin A1c (POC) 7.7 (A) 06/06/2019 08:57 AM     equivalent  to ave Blood Glucose of 258 mg/dl for 2-3 months prior to admission    Glucose well controlled on corrective lispro.     Juan MONROE

## 2020-05-02 ENCOUNTER — PATIENT OUTREACH (OUTPATIENT)
Dept: OTHER | Age: 60
End: 2020-05-02

## 2020-05-02 ENCOUNTER — TELEPHONE (OUTPATIENT)
Dept: CASE MANAGEMENT | Age: 60
End: 2020-05-02
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
## 2020-05-03 LAB
BACTERIA SPEC CULT: NORMAL
BACTERIA SPEC CULT: NORMAL
SERVICE CMNT-IMP: NORMAL
SERVICE CMNT-IMP: NORMAL

## 2020-05-04 ENCOUNTER — PATIENT OUTREACH (OUTPATIENT)
Dept: OTHER | Age: 60
End: 2020-05-04

## 2020-05-04 RX ORDER — TERBINAFINE HYDROCHLORIDE 250 MG/1
TABLET ORAL
Qty: 30 TAB | Refills: 2 | Status: SHIPPED | OUTPATIENT
Start: 2020-05-04 | End: 2020-05-12 | Stop reason: SDUPTHER

## 2020-05-04 NOTE — PROGRESS NOTES
5/4/2020, Contacted associate at listed phone number, HIPAA verified. Mr Lawyer Sheppard states was able to contact Associate Services/HR, will need to apply for FMLA/Short Term Disability and plans to keep PCP appt tomorrow, 5/5 to discuss further. Mr Lawyer Sheppard continues to improve, states fatigue is still biggest concern, easy to exhaust with even simple ADL tasks. He did purchase Pulse Ox device and states 96-97%. Instruction provided on importance of Incentive Spirometer, [facility] nurse, [facility] did review proper use and will begin to use at home. Mr Lawyer Sheppard states blood sugar this morning at 131. Goals      Instruct on red flags (ie. fever, increased productive cough, SOB, and chest pain) and when to seek urgent medical treatment. Provided instruction on red flags, importance of F/U appts and continued monitoring of symptoms/seek care and 911 as needed. Associate verbalizes understanding and  has Virtual Visit with PCP scheduled for Tue, 5/5, and agrees to call PCP with any changes or concerns. Provided instruction on 24/7 Nurse [employer], [phone] and agrees to call as needed. 5/4, Provided instruction on importance of incentive spirometer, [facility] nurse did review proper use and agrees to use at home.  Patient verbalizes understanding of self -management goals of living with Diabetes. Instruction provided on importance of Diabetes Management as current A1C at 10.6  Associate verbalizes understanding,  has not focused on appropriate diet and exercise and verbalizes plan to make positive health changes, reports Morning BS at 141 and has taken medication as directed. Reviewed information on [address] [facility]  Please call [phone] and select option #7 to schedule this appointment to take advantage of this service.  Telephone appointments are available Monday thru Friday from 7:30 AM till 5:30 PM.     Associate hernandez he did receive information/letter and plans to call on Monday. 5/4/2020, Reports blood sugar, fasting this morning at 131          Mr Amber Melchor denies further questions or concerns at this time and agrees to F/U call Wed, 5/6 to discuss PCP appt, further symptoms or concerns and continue goal work. See Previous Documentation:  Patient discharged from [facility] admitted on 04/27/2020 and discharged on 05/012020 for Respiratory Failure 2/2 Sepsis/CAP and Metabolic Encephalopathy. Per Discharge Summary:  Acute hypoxic/hypercapnic respiratory failure 2/2 Sepsis/CAP, possible narcotic contribution (oxycodone) - BiPAP until acid/base status back to likely baseline levels. Treated with IVAB with eventual transition to oral doxy at discharge for 5 more days. He was afebrile and without white count on day of discharge. His O2 sats were back to WNL. CoVID-19 ruled out. Cultures were negative. I suspect component of chronic hypercapnea given his baseline elevated bicarbonate levels in the low 30s, possible Pickwickian due to body habitus/probable CHYNA. Recommend outpatient sleep study.  Echo w/ preserved EF, no diastolic dysfunction, evidence of mild pulm HTN  A1C at 10.6 and recent LLE Cellulitis

## 2020-05-05 ENCOUNTER — VIRTUAL VISIT (OUTPATIENT)
Dept: FAMILY MEDICINE CLINIC | Age: 60
End: 2020-05-05

## 2020-05-05 DIAGNOSIS — G47.33 OBSTRUCTIVE SLEEP APNEA SYNDROME: Primary | ICD-10-CM

## 2020-05-05 DIAGNOSIS — J18.9 MULTIFOCAL PNEUMONIA: ICD-10-CM

## 2020-05-05 DIAGNOSIS — L03.116 LEFT LEG CELLULITIS: ICD-10-CM

## 2020-05-05 DIAGNOSIS — E66.01 MORBID OBESITY (HCC): ICD-10-CM

## 2020-05-05 DIAGNOSIS — T36.4X5A ESOPHAGITIS DUE TO DOXYCYCLINE: ICD-10-CM

## 2020-05-05 DIAGNOSIS — K20.80 ESOPHAGITIS DUE TO DOXYCYCLINE: ICD-10-CM

## 2020-05-05 NOTE — PROGRESS NOTES
Randy Darden is a 63 y.o.  male and has    Chief Complaint   Patient presents with   [facility] Follow Up   4/27/2020 admitted to [facility] with pneumonia  5/1/2020 discharged to home    [facility]. Consent:  He and/or health care decision maker is aware that he may receive a bill for this audio/video service, depending on his insurance coverage, and has provided verbal consent to proceed: Yes      Documentation:  I communicated with the patient and/or health care decision maker about hospitalization and pneumonia. Details of this discussion including any medical advice provided: see below      I affirm this is a Patient Initiated Episode with a Patient who has not had a related appointment within my department in the past 7 days or scheduled within the next 24 hours. Total Time: minutes: 11-20 minutes    This visit was conducted as a synchronous telemedicine service rendered via real-time interactive audio and video telecommunications system. On March 11, 2020, the [facility] declared the COVID-19 (Novel Coronavirus) viral disease to be a pandemic. As a result of this emergency, a rapidly evolving situation, practice patterns for physicians, physician assistants, and nurse practitioners are shifting to accommodate the need to treat in conjunction with unprecedented guidance from federal, state, and local authorities which include, but are not limited to, self-quarantines and/or limiting physical proximity to others under any number of circumstances. It is within this context (and with the understanding that this method of patient encounter is in the patient's best interest as well as the health and safety of other patients and the public) that Rema Goldberg is being provided for this patient encounter rather than a face-to-face visit.  This patient encounter is appropriate and reasonable under the circumstances given the patient's particular presentation at this time. Subjective:  He has lump on left lower leg which is painful and has been present for > 2 weeks. He states that there is fluctuance over the lump. There is no redness; he denies warmth; swelling increases when his leg is lowered. My left leg is still hurting quite a bit, still has the lump on it  I have indigestion frequently now, never had it before  Staying tired  When I blow/suck the incentive spirometer all the way to 2000 I have tightness/pain in my chest like my lungs hurt  Still coughing up a little phlegm at times  No wheezing  Ready to get back to normal and work  But I know I have a ways to go yet  Blood sugars are down fasting like:  [phone]  Really working on controlling my intake and calories  Have been walking around my yard a bit but tires me out quickly  Sleeping well    LLE Cellulitis - Improved on Keflex and inpatient IV antibiotics    Acute hypoxic/hypercapnic respiratory failure 2/2 Sepsis/CAP, possible narcotic contribution (oxycodone) - BiPAP until acid/base status back to likely baseline levels. Treated with IVAB with eventual transition to oral doxy at discharge for 5 more days. He was afebrile and without white count on day of discharge. His O2 sats were back to WNL.    Acute metabolic encephalopathy 2/2 Above - Resolved overnight with BiPAP      Cardiovascular Review:  The patient has diabetes, hypertension, hyperlipidemia, coronary artery disease and obesity. Diet and Lifestyle: generally follows a low fat low cholesterol diet, generally follows a low sodium diet, follows a diabetic diet regularly, exercises regularly, nonsmoker  Home BP Monitoring: is well controlled at home, ranging 130's/85's. Pertinent ROS: taking medications as instructed, no medication side effects noted, no TIA's, no chest pain on exertion, no dyspnea on exertion, no swelling of ankles.   Diabetes Mellitus:  He has diabetes mellitus.   Diabetic ROS - medication compliance: compliant all of the time, diabetic diet compliance: compliant most of the time, home glucose monitoring: is performed regularly. Lab review: labs reviewed, I note that glycosylated hemoglobin abnormal with worsening control     ROS   General ROS: negative for - chills, fatigue or fever  Psychological ROS: negative for - anxiety or depression  Ophthalmic ROS: positive for - uses glasses  ENT ROS: negative for - headaches, nasal congestion or sore throat  Endocrine ROS: negative for - polydipsia/polyuria or temperature intolerance  Respiratory ROS: no cough, shortness of breath, or wheezing  Cardiovascular ROS: no chest pain or dyspnea on exertion  Gastrointestinal ROS: no abdominal pain, change in bowel habits, or black or bloody stools  Genito-Urinary ROS: no dysuria, trouble voiding, or hematuria  Musculoskeletal ROS: negative for - joint pain or muscle pain  Neurological ROS: negative for - numbness/tingling or weakness     All other systems reviewed and are negative. Objective: There were no vitals filed for this visit. alert, well appearing, and in no distress, oriented to person, place, and time and morbidly obese  Mental status - normal mood, behavior, speech, dress, motor activity, and thought processes  Chest - normal work of breathing  Neurological - cranial nerves II through XII intact  Ext-  18 in diameter left lower leg over mass  16.5 in diameter right lower leg  LABS   hgb a1c 10.6  CoVID-19 ruled out. Cultures were negative. TESTS  CT chest  IMPRESSION:     1. No convincing evidence of PE.  2. Multifocal airspace disease, upper lobe predominant and asymmetric to the  right suggesting multifocal pneumonia and pulmonary edema. 3. Trace right pleural effusion. 4. Mild mediastinal adenopathy, likely reactive. Assessment/Plan:    1.  Obstructive sleep apnea syndrome  component of chronic hypercapnea given his baseline elevated bicarbonate levels in the low 30s, possible Domenicoian due to body habitus/probable CHYNA. Recommend outpatient sleep study. Echo w/ preserved EF, no diastolic dysfunction, evidence of mild pulm HTN.  - SLEEP MEDICINE REFERRAL    2. Esophagitis due to doxycycline  Monitor symptoms    3. Left leg cellulitis  Improved with abx;     4. Morbid obesity ([address]  Recommend 1800 diabetic diet    5. Multifocal pneumonia  Complete doxycycline course  6. Physical deconditioning  Due to hospitalization; gradual return to activity    Lab review: labs reviewed, I note that glycosylated hemoglobin abnormal high, renal functions normal, hemogram normal      I have discussed the diagnosis with the patient and the intended plan as seen in the above orders. The patient has received an after-visit summary and questions were answered concerning future plans. I have discussed medication side effects and warnings with the patient as well. I have reviewed the plan of care with the patient, accepted their input and they are in agreement with the treatment goals. More than 1/2 of this 40 minute visit was spent in counselling and coordination of care, as described above.

## 2020-05-05 NOTE — PROGRESS NOTES
Trent Chawla presents today for   Chief Complaint   Patient presents with   [facility] Follow Up       Is someone accompanying this pt? na    Is the patient using any DME equipment during OV? na    Depression Screening:  3 most recent PHQ Screens 8/9/2018   Little interest or pleasure in doing things Not at all   Feeling down, depressed, irritable, or hopeless Not at all   Total Score PHQ 2 0       Learning Assessment:  Learning Assessment 3/19/2014   PRIMARY LEARNER Patient   HIGHEST LEVEL OF EDUCATION - PRIMARY LEARNER  2 YEARS Kary PRIMARY LEARNER NONE   CO-LEARNER CAREGIVER No   PRIMARY LANGUAGE ENGLISH   LEARNER PREFERENCE PRIMARY READING     -     -   ANSWERED BY self   RELATIONSHIP SELF       Abuse Screening:  Abuse Screening Questionnaire 3/19/2014   Do you ever feel afraid of your partner? N   Are you in a relationship with someone who physically or mentally threatens you? N   Is it safe for you to go home? Y       Fall Risk  No flowsheet data found. Health Maintenance reviewed and discussed and ordered per Provider. Health Maintenance Due   Topic Date Due    Shingrix Vaccine Age 49> (1 of 2) 07/21/2011    Pneumococcal 0-64 years (1 of 1 - PPSV23) 11/16/2011    Eye Exam Retinal or Dilated  03/06/2020   . Coordination of Care:  1. Have you been to the ER, urgent care clinic since your last visit? Hospitalized since your last visit? Yes, 4/27/20, [place], pneumonia, and sepsis    2. Have you seen or consulted any other health care providers outside of the [address] [place] [address] since your last visit? Include any pap smears or colon screening.  no      Last  Checked na  Last UDS Checked na  Last Pain contract signed: [facility] follow up

## 2020-05-06 ENCOUNTER — PATIENT OUTREACH (OUTPATIENT)
Dept: OTHER | Age: 60
End: 2020-05-06

## 2020-05-06 NOTE — PROGRESS NOTES
5/16/2020, Reached associate at listed phone number, HIPAA verified. Mr Vida Abbott states completed virtual visit with PCP yesterday, 5/5 and will do again on 5/12. Mr Vida hernandez antibiotics are finished, Left leg area still swollen and sore, PCP aware and will continue to monitor for S&S of infection and report concerns to PCP, as [facility] PCP may want ultrasound to explore further. Mr Vida hernandez fatigue is improving, but even simple task of preparing lunch is exhausting, [facility] continues to practice with incentive spirometer and trying to spend some night/sleep time on stomach. PCP has placed referral for sleep study, by not sure when will be able to be scheduled. Mr Vida hernandez continues to make better, healthy choices, monitoring blood sugars and has even ordered a scale to track weight. Mr Vida Abbott agrees to allow this writer/ACM to continue to follow. Goals      Instruct on red flags (ie. fever, increased productive cough, SOB, and chest pain) and when to seek urgent medical treatment. Provided instruction on red flags, importance of F/U appts and continued monitoring of symptoms/seek care and 911 as needed. Associate verbalizes understanding and [facility] has Virtual Visit with PCP scheduled for Tue, 5/5, and agrees to call PCP with any changes or concerns. Provided instruction on 24/7 Nurse [employer], [phone] and agrees to call as needed. 5/4, Provided instruction on importance of incentive spirometer, [facility] nurse did review proper use and agrees to use at home. Kept 5/5 PCP appt(virtual visit)       Patient verbalizes understanding of self -management goals of living with Diabetes.       Instruction provided on importance of Diabetes Management as current A1C at 10.6  Associate verbalizes understanding, [facility] has not focused on appropriate diet and exercise and verbalizes plan to make positive health changes, reports Morning BS at 141 and has taken medication as directed. Reviewed information on [address] [facility]  Please call [phone] and select option #7 to schedule this appointment to take advantage of this service. Telephone appointments are available Monday thru Friday from 7:30 AM till 5:30 PM.     Associate states he did receive information/letter and plans to call on Monday. 5/4/2020, Reports blood sugar, fasting this morning at 131          PLAN: F/U next week to continue JOSE L. Discuss further symptoms or concerns, blood sugars, and provide instruction as needed.    See Previous Documentation:  S/P [facility], admitted on 04/27/2020 and discharged on 05/012020 for Respiratory Failure 2/2 Sepsis/CAP and Metabolic Encephalopathy

## 2020-05-12 ENCOUNTER — VIRTUAL VISIT (OUTPATIENT)
Dept: FAMILY MEDICINE CLINIC | Age: 60
End: 2020-05-12

## 2020-05-12 ENCOUNTER — PATIENT OUTREACH (OUTPATIENT)
Dept: OTHER | Age: 60
End: 2020-05-12

## 2020-05-12 DIAGNOSIS — E66.01 MORBID OBESITY (HCC): ICD-10-CM

## 2020-05-12 DIAGNOSIS — G47.33 OBSTRUCTIVE SLEEP APNEA SYNDROME: ICD-10-CM

## 2020-05-12 DIAGNOSIS — E11.51 TYPE 2 DIABETES MELLITUS WITH PERIPHERAL VASCULAR DISEASE (HCC): ICD-10-CM

## 2020-05-12 DIAGNOSIS — S80.12XS LEG HEMATOMA, LEFT, SEQUELA: Primary | ICD-10-CM

## 2020-05-12 DIAGNOSIS — B35.1 ONYCHOMYCOSIS: ICD-10-CM

## 2020-05-12 DIAGNOSIS — I73.9 PERIPHERAL VASCULAR DISEASE (HCC): ICD-10-CM

## 2020-05-12 RX ORDER — TERBINAFINE HYDROCHLORIDE 250 MG/1
TABLET ORAL
Qty: 30 TAB | Refills: 2 | Status: SHIPPED | OUTPATIENT
Start: 2020-05-12 | End: 2020-09-04 | Stop reason: SDUPTHER

## 2020-05-12 NOTE — PROGRESS NOTES
5/12/2020, Reached associate at listed phone number, HIPAA verified, states back to work, working from home, on conference call at this time. Kept PCP F/U appt, virtual visit earlier today and denies questions or concerns at this time. PLAN: F/U next week to continue JOSE L. Discuss further symptoms or concerns, blood sugars, and provide instruction/assist with personal goal work as needed. See Previous Documentation:  5/16/2020, Reached associate at listed phone number, HIPAA verified. Mr Charlotte hernandez completed virtual visit with PCP yesterday, 5/5 and will do again on 5/12. Mr Charlotte hernandez antibiotics are finished, Left leg area still swollen and sore, PCP aware and will continue to monitor for S&S of infection and report concerns to PCP, as states PCP may want ultrasound to explore further. Mr Charlotte hernandez fatigue is improving, but even simple task of preparing lunch is exhausting, states continues to practice with incentive spirometer and trying to spend some night/sleep time on stomach. PCP has placed referral for sleep study, by not sure when will be able to be scheduled. Mr Charlotte hernandez continues to make better, healthy choices, monitoring blood sugars and has even ordered a scale to track weight. Mr Charlotte Oliver agrees to allow this writer/ACM to continue to follow. Goals                  Instruct on red flags (ie. fever, increased productive cough, SOB, and chest pain) and when to seek urgent medical treatment.           Provided instruction on red flags, importance of F/U appts and continued monitoring of symptoms/seek care and 911 as needed. Associate verbalizes understanding and states has Virtual Visit with PCP scheduled for Tue, 5/5, and agrees to call PCP with any changes or concerns. Provided instruction on 24/7 Nurse [employer], [phone] and agrees to call as needed.   5/4, Provided instruction on importance of incentive spirometer, [facility] nurse did review proper use and agrees to use at home. Kept 5/5 PCP appt(virtual visit)         Patient verbalizes understanding of self -management goals of living with Diabetes.           Instruction provided on importance of Diabetes Management as current A1C at 10.6  Associate verbalizes understanding, states has not focused on appropriate diet and exercise and verbalizes plan to make positive health changes, reports Morning BS at 141 and has taken medication as directed. Reviewed information on [facility]  Please call [phone] and select option #7 to schedule this appointment to take advantage of this service. Telephone appointments are available Monday thru Friday from 7:30 AM till 5:30 PM.     Associate states he did receive information/letter and plans to call on Monday.   5/4/2020, Reports blood sugar, fasting this morning at 131             See Previous Documentation:  S/P [facility], admitted on 04/27/2020 and discharged on 05/012020 for Respiratory Failure 2/2 Sepsis/CAP and Metabolic Encephalopathy

## 2020-05-12 NOTE — PROGRESS NOTES
Jackie Gibson is a 63 y.o.  male and presents with    Chief Complaint   Patient presents with    Diabetes    Sleep Apnea   pt is evaluated via doxy. me  Consent: Jackie Gibson, who was seen by synchronous (real-time) audio-video technology, and/or his healthcare decision maker, is aware that this patient-initiated, Telehealth encounter on 5/12/2020 is a billable service, with coverage as determined by his insurance carrier. He is aware that he may receive a bill and has provided verbal consent to proceed: Yes.   pt is at his home, and I am at [facility]. This visit was conducted as a synchronous telemedicine service rendered via real-time interactive audio and video telecommunications system. On March 11, 2020, the [facility] declared the COVID-19 (Novel Coronavirus) viral disease to be a pandemic. As a result of this emergency, a rapidly evolving situation, practice patterns for physicians, physician assistants, and nurse practitioners are shifting to accommodate the need to treat in conjunction with unprecedented guidance from federal, state, and local authorities which include, but are not limited to, self-quarantines and/or limiting physical proximity to others under any number of circumstances. It is within this context (and with the understanding that this method of patient encounter is in the patient's best interest as well as the health and safety of other patients and the public) that Martha Godinez is being provided for this patient encounter rather than a face-to-face visit. This patient encounter is appropriate and reasonable under the circumstances given the patient's particular presentation at this time. Subjective:  He has returned to work, but working from home. His breathing has improved after treatment for pneumonia. He has increased energy. He has diabetes. [facility] takes his metformin as prescribed.   He has not been adhering to a diabetic diet.  He checks his blood glucose regularly at home with AM blood glucose ranging 120-130.  He endorses intermittent numbness/tingling in b/l feet.  Denies any changes in vision, abd pain, or N/V.     Cardiovascular Review:  The patient has diabetes, hypertension, hyperlipidemia, coronary artery disease and obesity. Diet and Lifestyle: generally follows a low fat low cholesterol diet, generally follows a low sodium diet, follows a diabetic diet regularly, exercises regularly, nonsmoker  Home BP Monitoring: is well controlled at home, ranging 130's/85's. Pertinent ROS: taking medications as instructed, no medication side effects noted, no TIA's, no chest pain on exertion, no dyspnea on exertion, no swelling of ankles.   Diabetes Mellitus:  He has diabetes mellitus. Diabetic ROS - medication compliance: compliant all of the time, diabetic diet compliance: compliant most of the time, home glucose monitoring: is performed regularly.    Lab review: labs reviewed, I note that glycosylated hemoglobin mildly abnormal but acceptable.      ROS   General ROS: negative for - chills, fatigue or fever  Psychological ROS: negative for - anxiety or depression  Ophthalmic ROS: positive for - uses glasses  ENT ROS: negative for - headaches, nasal congestion or sore throat  Endocrine ROS: negative for - polydipsia/polyuria or temperature intolerance  Respiratory ROS: no cough, shortness of breath, or wheezing  Cardiovascular ROS: no chest pain or dyspnea on exertion  Gastrointestinal ROS: no abdominal pain, change in bowel habits, or black or bloody stools  Genito-Urinary ROS: no dysuria, trouble voiding, or hematuria  Musculoskeletal ROS: negative for - joint pain or muscle pain  Neurological ROS: negative for - numbness/tingling or weakness    Objective:  Vitals:    05/12/20 0942   PainSc:   5   PainLoc: Generalized     alert, well appearing, and in no distress, oriented to person, place, and time and morbidly obese  Mental status - normal mood, behavior, speech, dress, motor activity, and thought processes  Chest - normal work of breathing  Skin - old ecchymosis  Neuro - CN II-XII intact    LABS   Glucose 130    Assessment/Plan:    1. Leg hematoma, left, sequela  Continue elevation and ice and heat    2. Type 2 diabetes mellitus with peripheral vascular disease (HCC)  Goal hgb a1c <7    3. Morbid obesity ([facility]  I have reviewed/discussed the above normal BMI with the patient. I have recommended the following interventions: dietary management education, guidance, and counseling and encourage exercise . .        4. Obstructive sleep apnea syndrome  Needs sleep study    5. Peripheral vascular disease ([facility]      6. Onychomycosis  Continue antifungal  - terbinafine HCL (LAMISIL) 250 mg tablet; TAKE ONE TABLET BY MOUTH ONE TIME DAILY  Dispense: 30 Tab; Refill: 2      Lab review: labs reviewed, I note that glucose is better controlled      I have discussed the diagnosis with the patient and the intended plan as seen in the above orders. I have discussed medication side effects and warnings with the patient as well. I have reviewed the plan of care with the patient, accepted their input and they are in agreement with the treatment goals.

## 2020-05-12 NOTE — PROGRESS NOTES
Kiel Jiang presents today for   Chief Complaint   Patient presents with    Diabetes    Sleep Apnea       Is someone accompanying this pt? na    Is the patient using any DME equipment during OV? na    Depression Screening:  3 most recent PHQ Screens 8/9/2018   Little interest or pleasure in doing things Not at all   Feeling down, depressed, irritable, or hopeless Not at all   Total Score PHQ 2 0       Learning Assessment:  Learning Assessment 3/19/2014   PRIMARY LEARNER Patient   HIGHEST LEVEL OF EDUCATION - PRIMARY LEARNER  2 YEARS Kary PRIMARY LEARNER NONE   CO-LEARNER CAREGIVER No   PRIMARY LANGUAGE ENGLISH   LEARNER PREFERENCE PRIMARY READING     -     -   ANSWERED BY self   RELATIONSHIP SELF       Abuse Screening:  Abuse Screening Questionnaire 3/19/2014   Do you ever feel afraid of your partner? N   Are you in a relationship with someone who physically or mentally threatens you? N   Is it safe for you to go home? Y       Fall Risk  No flowsheet data found. Health Maintenance reviewed and discussed and ordered per Provider. Health Maintenance Due   Topic Date Due    Shingrix Vaccine Age 49> (1 of 2) 07/21/2011    Pneumococcal 0-64 years (1 of 1 - PPSV23) 11/16/2011    Eye Exam Retinal or Dilated  03/06/2020   . Coordination of Care:  1. Have you been to the ER, urgent care clinic since your last visit? Hospitalized since your last visit? no    2. Have you seen or consulted any other health care providers outside of the [address] [place] [address] since your last visit? Include any pap smears or colon screening. no      Last  Checked na  Last UDS Checked na  Last Pain contract signed: na    Patient presents in office today for routine care.   Patient concerns: hematoma left leg

## 2020-05-19 ENCOUNTER — PATIENT OUTREACH (OUTPATIENT)
Dept: OTHER | Age: 60
End: 2020-05-19

## 2020-05-19 ENCOUNTER — TELEPHONE (OUTPATIENT)
Dept: PHARMACY | Age: 60
End: 2020-05-19

## 2020-05-19 DIAGNOSIS — I10 ESSENTIAL HYPERTENSION, MALIGNANT: ICD-10-CM

## 2020-05-19 DIAGNOSIS — E11.8 CONTROLLED TYPE 2 DIABETES MELLITUS WITH COMPLICATION, WITHOUT LONG-TERM CURRENT USE OF INSULIN (HCC): ICD-10-CM

## 2020-05-19 NOTE — LETTER
Trell [address] [facility] Helder Infante 10 Phone: [phone], option 7 Balbir Juan [address] [facility] Christoph Zapata [address] [id number]  
 
 
 
 
05/21/20 Dear Balbir Juan, Thank you for taking the time to speak with us for the [facility] [facility]! Here is a summary of some things we talked about: 
 
After your recent pharmacist visit, the below were identified as opportunities to assist you with your diabetes management: 
- Dr. Gerardo Herrera approved the change from daily Victoza to once weekly Ozempic. Please let me know if you have any additional questions. - Talk with Dr. Gerardo Herrera about rechecking your liver function labs. - I did determine you have received the Pneumovax 23 vaccine in the past, so you are up to date. - If needed for further blood sugar control (and tolerated), metformin dose can be increased to 1000 mg twice daily. Current medications eligible for copay waiver, up to $600, through [address] [place] [address] home delivery: 
- aspirin (with prescription), hydrochlorothiazide, Ozempic (replaces Victoza), metformin - [employer] meter and supplies (other options available - let us know if you have any troubles with this meter) - Prescriptions sent to home delivery pharmacy by your provider: aspirin, hydrochlorothiazide, metformin, Ozempic, [employer] meter & supplies Home delivery pharmacy: [phone], option 0 - please allow 2 weeks for processing and shipping prescriptions. Be sure to set up your profile with them if you have not already done so. Requirements to be completed: - Initial requirements (due by 7/1/2020): Requirements met  
 
(continued on next page) - Ongoing requirements (due by 12/31/2020): diabetes education/care coordination (if A1c over 8%), Lipid panel, Urine microalbumin, Influenza vaccination for 2215-3930, Medication adherence over 70% Please work with your provider to ensure that the 2020 requirements are completed by the appropriate dates. Please feel free to contact us with questions. Thank you, Trell [facility] Team 
Telephone: [phone] Option #7 Fax: [phone] Email: [email or web address]

## 2020-05-19 NOTE — TELEPHONE ENCOUNTER
[facility] Diabetes Management Program  =================================================================  Khadar Archibald is a 63 y.o. male enrolled in the [facility] AT [facility]) Employee Diabetes Management Program. Patient provided Brandee Aguila with verbal consent to remain in the program for this year. Medications:  Medication Sig    terbinafine HCL (LAMISIL) 250 mg tablet TAKE ONE TABLET BY MOUTH ONE TIME DAILY    ondansetron (ZOFRAN ODT) 4 mg disintegrating tablet Take 1 Tab by mouth every eight (8) hours as needed for Nausea or Vomiting.  liraglutide (VICTOZA 3-TAQUERIA) 0.6 mg/0.1 mL (18 mg/3 mL) pnij 1.8 mg by SubCUTAneous route daily. - admits to missed doses with daily injection    metFORMIN (GLUCOPHAGE) 500 mg tablet Take 1 Tab by mouth two (2) times daily (with meals). - no ADRs, tolerates well; has not tried higher dose in the past    amLODIPine (NORVASC) 5 mg tablet Take 1 Tab by mouth daily.  hydroCHLOROthiazide (HYDRODIURIL) 25 mg tablet Take 1 Tab by mouth daily.  Insulin Needles, Disposable, (PEN NEEDLE) 32 gauge x 5/32\" ndle Use with victoza    mupirocin (BACTROBAN) 2 % ointment Apply  to affected area daily.  hydrocortisone-acetic acid (VOSOL-HC) otic solution Administer 2 Drops into each ear two (2) times a day. - states was unable to get this due to insurance, was given mometasone solution    mometasone (ELOCON) 0.1 % lotion Apply to ear once a day as needed    aspirin 81 mg chewable tablet Take 81 mg by mouth daily.  - getting OTC      Current Pharmacy: [address] discussed getting set up with [facility] home delivery pharmacy  Current testing supplies/frequency: no claims noted; was under [facility] program, still has meter and strips  Pen needles/syringes: on free pen needles - last filled 10/16/19 x 200 (32G x 4mm)    Has been taking Victoza with \"varied results\". Sometimes forgets daily Victoza (oral pills OK). \"Pain in butt to take everyday\".  Thinks would do better with once weekly option. \"off the rails with diet\" since working from home. Lots of junk food. After hospitalization came home and threw it all away. Allergies:  No Known Allergies     Vitals/Labs:  BP Readings from Last 3 Encounters:   05/01/20 132/59   04/23/20 131/85   01/22/20 138/86     Component      Latest Ref Rng & Units 10/16/2019 7/27/2017          10:45 AM  8:36 AM   Microalbumin,urine random      0 - 3.0 MG/DL 1.17 2.90   Creatinine, urine      30 - 125 mg/dL 144.00 (H) 221.44 (H)   Microalbumin/Creat.  Ratio      0 - 30 mg/g 8 13     Lab Results   Component Value Date/Time    Hemoglobin A1c 10.6 (H) 04/28/2020 04:28 AM    Hemoglobin A1c 9.5 (H) 01/22/2020 10:10 AM    Hemoglobin A1c 8.6 (H) 07/27/2017 08:36 AM    Hemoglobin A1c (POC) 7.7 (A) 06/06/2019 08:57 AM    Hemoglobin A1c (POC) 7.9 03/07/2019 08:54 AM    Hemoglobin A1c (POC) 8.3 02/06/2019 02:32 PM     Lab Results   Component Value Date/Time    Cholesterol, total 208 (H) 10/16/2019 10:45 AM    HDL Cholesterol 41 10/16/2019 10:45 AM    LDL, calculated 133.8 (H) 10/16/2019 10:45 AM    VLDL, calculated 33.2 10/16/2019 10:45 AM    Triglyceride 166 (H) 10/16/2019 10:45 AM    CHOL/HDL Ratio 5.1 (H) 10/16/2019 10:45 AM     Component      Latest Ref Rng & Units 4/28/2020 4/27/2020 7/27/2017           4:29 AM  9:01 PM  8:36 AM   ALT (SGPT)      16 - 61 U/L 242 (H) 287 (H) 28   AST      10 - 38 U/L 143 (H) 223 (H) 14 (L)     The ASCVD Risk score (Anamaria Herrmann, et al., 2013) failed to calculate for the following reasons:    ASCVD risk score not calculated     Lab Results   Component Value Date/Time    Creatinine 0.81 04/29/2020 03:00 AM    GFR est AA >60 04/29/2020 03:00 AM    GFR est non-AA >60 04/29/2020 03:00 AM     Component      Latest Ref Rng & Units 4/29/2020 4/28/2020 4/27/2020 7/27/2017           3:00 AM  4:29 AM  9:01 PM  8:36 AM   GFR est non-AA      >60 ml/min/1.73m2 >60 58 (L) 51 (L) >60       Estimated Creatinine Clearance: 158.9 mL/min (by C-G formula based on SCr of 0.81 mg/dL). Immunizations:  Immunization History   Administered Date(s) Administered    (RETIRED) Pneumococcal Vaccine (Unspecified Type) 2011    Influenza Vaccine 2013, 10/15/2019    Influenza Vaccine Split 2011    TD Vaccine 2008    Tdap 2017      Social History:  Social History     Tobacco Use    Smoking status: Former Smoker     Last attempt to quit: 10/15/1993     Years since quittin.6    Smokeless tobacco: Never Used    Tobacco comment:    Substance Use Topics    Alcohol use: No     ASSESSMENT:  Initial Program Requirements (Y indicates has completed for the year, N indicates needs to be completed by 2020):  Yes - OV with provider for DM (1st)  Yes - A1c (1st)     Ongoing Program Requirements (Y indicates has completed for the year, N indicates needs to be completed by 2020):  Yes - OV with provider for DM (2nd)  No - ACC/diabetes educator visit (if A1c over 8%)  Yes - A1c (2nd)  No - Lipid panel  No - Urine microalbumin  Yes - Pneumococcal vaccination: appears due for Pneumovax 23 per health maintenance - is agreeable if needed. Per review after visit did receive pneumococcal vaccine (unspecified type) 2011; per \"other orders\" tab did confirm this was the PPSV23 vaccine (updated immunization history)  No - Influenza vaccination for 2020  No - Medication adherence over 70%  Yes - On statin or contraindication(s) elevated liver enzymes (over 3 x ULN)  Yes - On ACEi/ARB or contraindication(s) Normal blood pressure, urinary albumin-to-creatinine ratio, and eGFR     Formulary Medication Review:  Non-formulary or medications with cost-effective alternatives: would like to change to covered Bluetooth-enabled glucometer/supplies.      Current medications eligible for copay waiver, up to $600, through [facility]:  - aspirin (with prescription), hydrochlorothiazide, Victoza (or Ozempic), metformin  - Generic ([facility] pharmacy-stocked) insulin syringes and pen needles  - [employer]atrix or [employer] meter and supplies     Diabetes Care:   - Glycemic Goal: <7.0%. Is not at blood glucose goal. Current regimen metformin 500 mg BID, Victoza 1.8 mg daily. Dietary and medication non-adherence seemingly contributing, also noted recent hospitalizations. Metformin refill history: 6/6/19, 1/22/20, 5/1/20 x 90 ds; Victoza refill history: 6/6/19 x 90 ds; 1/22/20, 2/13/20, 5/1/20 x 30 ds (noted some denied claims due to step therapy). Can continue to titrate metformin as needed/tolerated. Future consider to initiate SGLT2i with cardiovascular benefit (PVD/CAD per problem list but pt denied history during discussion today?) - [place] is covered option. Other Considerations:  - Blood Pressure Goal: BP less than 140/90 mmHg due to history of DM: Is at blood pressure goal. Current regimen amlodipine 5 mg daily, HCTZ 25 mg daily. eGFR and UACR WNL. - Lipids: Patient has a history of ASCVD and therefore may be a candidate for high-intensity statin therapy based on updated guidelines. However, last ALT elevated over 3 x ULN (from hospitalization). Pt is resistant to statin therapy due to friends' experiences (\"muscle wasting\"); advised pt would override requirement for this year given elevated liver enzymes, encouraged him to f/u with provider for lab check (he plans to do at physical in September) and continue to discuss pros and cons of statin therapy if liver enzymes return to normal. Do note pt refusal of statins in previous PCP notes. Did advise this requirement is reassessed yearly  - Smoking status: former smoker    PLAN:  - Consideration(s) for provider:   · 90 day Rxs to [facility] home delivery: metformin, HCTZ, aspirin  · Change to Ag[employer] Jazz - BID testing  · Would like to change from Victoza 1.8 mg daily to Ozempic 1 mg once weekly for adherence  · Continue to monitor medication adherence.  Suggest titrating metformin as needed/tolerated. · Repeat LFTs  - DM program gaps identified:   · Initial requirements: Requirements met   · Ongoing requirements: ACC/diabetes educator visit (if A1c over 8%), Lipid panel, Urine microalbumin, Influenza vaccination for 6743-8872, and Medication adherence over 70%   - Education to patient: as above and:   · Benefit through [facility] home delivery and how to use including need to call 7-10 days in advance to initiate refills (no automatic refills); email sent to [email or web address] with pharmacy enrollment form  · Potential benefit of statin therapy if liver enzymes return to normal  · Potential Benefit/indication of PPSV23 if needed however after review appears has already received  · If needed/tolerated metformin dose can be titrated  · Will send letter per pt request  - Follow up: PCP for identified gaps or as scheduled below and Diabetes Educator or Ambulatory Care Coordinator for identified gaps or as scheduled below  - Upcoming appointments:   No future appointments.     Stevie Brady, PharmD, [address] [place] [address] Pharmacist  Dept: toll free [phone], option 7

## 2020-05-19 NOTE — PROGRESS NOTES
5/19/2020, Reached associate at listed phone number, HIPAA verified.  continues to work at home at this time, denies symptoms of COVID. Mr Amber hernandez continues to keep F/U virtual appts, see 5/12 office note, awaiting scheduling of sleep study and working with Pharmacy/Diabetes program.  Mr Amber hernandez wife, Catrina Manuel remains healthcare decision maker, denies need for further ACP at this time. Mr Amber Melchor agrees to return call in 2 weeks to complete JOSE L/goal work. He denies further questions or need for assistance at this time. Mr Amber Melchor agrees to keep this ACM contact information to call with further questions or concerns between calls. PLAN: F/U call in 2 weeks to complete JOSE L. See Previous Documentation:  5/6/2020, Reached associate at listed phone number, HIPAA verified. Mr Amber hernandez completed virtual visit with PCP yesterday, 5/5 and will do again on 5/12. Mr Amber hernandez antibiotics are finished, Left leg area still swollen and sore, PCP aware and will continue to monitor for S&S of infection and report concerns to PCP, as states PCP may want ultrasound to explore further. Mr Amber hernandez fatigue is improving, but even simple task of preparing lunch is exhausting, states continues to practice with incentive spirometer and trying to spend some night/sleep time on stomach. PCP has placed referral for sleep study, by not sure when will be able to be scheduled. Mr Amber hernandez continues to make better, healthy choices, monitoring blood sugars and has even ordered a scale to track weight. Mr Amber Melchor agrees to allow this writer/ACM to continue to follow. Goals                  Instruct on red flags (ie. fever, increased productive cough, SOB, and chest pain) and when to seek urgent medical treatment.           Provided instruction on red flags, importance of F/U appts and continued monitoring of symptoms/seek care and 911 as needed.   Associate verbalizes understanding and  has Virtual Visit with PCP scheduled for Tue, 5/5, and agrees to call PCP with any changes or concerns. Provided instruction on 24/7 Nurse [employer], [phone] and agrees to call as needed. 5/4, Provided instruction on importance of incentive spirometer,  nurse did review proper use and agrees to use at home. Kept 5/5 PCP appt(virtual visit)         Patient verbalizes understanding of self -management goals of living with Diabetes.           Instruction provided on importance of Diabetes Management as current A1C at 10.6  Associate verbalizes understanding,  has not focused on appropriate diet and exercise and verbalizes plan to make positive health changes, reports Morning BS at 141 and has taken medication as directed. Reviewed information on [address] [facility]  Please call [phone] and select option #7 to schedule this appointment to take advantage of this service. Telephone appointments are available Monday thru Friday from 7:30 AM till 5:30 PM.     Associate states he did receive information/letter and plans to call on Monday. 5/4/2020, Reports blood sugar, fasting this morning at 131             PLAN: F/U next week to continue JOSE L. Discuss further symptoms or concerns, blood sugars, and provide instruction as needed.    See Previous Documentation:  S/P D[facility], admitted on 04/27/2020 and discharged on 05/012020 for Respiratory Failure 2/2 Sepsis/CAP and Metabolic Encephalopathy

## 2020-05-19 NOTE — TELEPHONE ENCOUNTER
Tomas Barber MD: patient is currently enrolled in [facility].   · Patient would like 80 day Rxs for covered medications through program sent to [facility] home delivery pharmacy, 90 day supplies preferred:  · Aspirin, hydrochlorothiazide, metformin  · Patient would like to switch to covered Bluetooth-enabled glucometer/supplies ([employer]amatrix Navya Friar) - free through program  · See 5/19/20 pharmacy visit for details     Orders pending for provider convenience, please review/modify & sign if in agreement. Thank you!   Tate Youngblood, PharmD, [address] [place] [address] Pharmacist  Dept: toll free [phone], option 7

## 2020-05-20 ENCOUNTER — TELEPHONE (OUTPATIENT)
Dept: FAMILY MEDICINE CLINIC | Age: 60
End: 2020-05-20

## 2020-05-20 DIAGNOSIS — E11.51 TYPE 2 DIABETES MELLITUS WITH PERIPHERAL VASCULAR DISEASE (HCC): Primary | ICD-10-CM

## 2020-05-20 DIAGNOSIS — E11.8 CONTROLLED TYPE 2 DIABETES MELLITUS WITH COMPLICATION, WITHOUT LONG-TERM CURRENT USE OF INSULIN (HCC): ICD-10-CM

## 2020-05-20 RX ORDER — SEMAGLUTIDE 1.34 MG/ML
1 INJECTION, SOLUTION SUBCUTANEOUS
Qty: 6 PEN | Refills: 1 | Status: CANCELLED | OUTPATIENT
Start: 2020-05-20

## 2020-05-20 RX ORDER — SEMAGLUTIDE 1.34 MG/ML
1 INJECTION, SOLUTION SUBCUTANEOUS
Qty: 2 PEN | Refills: 3 | Status: SHIPPED | OUTPATIENT
Start: 2020-05-20 | End: 2021-01-26

## 2020-05-20 RX ORDER — METFORMIN HYDROCHLORIDE 500 MG/1
500 TABLET ORAL 2 TIMES DAILY WITH MEALS
Qty: 180 TAB | Refills: 3 | Status: SHIPPED | OUTPATIENT
Start: 2020-05-20 | End: 2020-12-07 | Stop reason: DRUGHIGH

## 2020-05-20 RX ORDER — HYDROCHLOROTHIAZIDE 25 MG/1
25 TABLET ORAL DAILY
Qty: 90 TAB | Refills: 3 | Status: SHIPPED | OUTPATIENT
Start: 2020-05-20 | End: 2021-06-17 | Stop reason: SDUPTHER

## 2020-05-20 RX ORDER — INSULIN PUMP SYRINGE, 3 ML
EACH MISCELLANEOUS
Qty: 1 KIT | Refills: 0 | Status: SHIPPED | OUTPATIENT
Start: 2020-05-20 | End: 2022-07-08

## 2020-05-20 RX ORDER — GUAIFENESIN 100 MG/5ML
81 LIQUID (ML) ORAL DAILY
Qty: 90 TAB | Refills: 3 | Status: SHIPPED | OUTPATIENT
Start: 2020-05-20 | End: 2021-06-17 | Stop reason: SDUPTHER

## 2020-05-20 RX ORDER — LANCETS
EACH MISCELLANEOUS
Qty: 200 EACH | Refills: 3 | Status: SHIPPED | OUTPATIENT
Start: 2020-05-20 | End: 2021-06-17 | Stop reason: SDUPTHER

## 2020-05-20 NOTE — TELEPHONE ENCOUNTER
PT called and stated that Dr. John Fernandez sent a wrong prescription to [facility] pharmacy for his fungus on his feet. Pt doesn't know the exact name of the medication but it is not doxycycline. Asking for the right prescription to be sent.

## 2020-05-20 NOTE — TELEPHONE ENCOUNTER
Eliza Castleman, MD: patient is currently enrolled in [facility].     Patient would like to change from Victoza 1.8 mg daily to Ozempic (equivalent dose 1 mg once weekly) to help with adherence if OK with you? · Admits to regular missed doses with daily Victoza and thinks once weekly option would be better for him  · Free for him through DM program  · Trulicity also covered option if you prefer    See 5/19/20 pharmacy visit for details     Order pending for provider convenience, please review/modify & sign if in agreement. Thank you!   Wojciech Cheek, PharmD, [address] [place] [address] Pharmacist  Dept: toll free [phone], option 7

## 2020-05-21 NOTE — TELEPHONE ENCOUNTER
Noted Rxs signed by provider, thank you! Also changed from Victoza to [address] - previously counseled pt on change. Letter sent to patient per his request.     For Pharmacy Admin Tracking Only    PHSO: PHSO Patient?: Yes  Total # of Interventions Recommended: Count: 4  - New Order #: 2 New Medication Order Reason(s): Adherence and Cost Conversion  - Refills Provided #: 1  - Updated Order #: 1 Updated Order Reason(s):  Other  Recommended intervention potential cost savings: 1  Total Interventions Accepted: 4  Time Spent (min): [facility], [facility], PharmD  [address]

## 2020-06-02 ENCOUNTER — PATIENT OUTREACH (OUTPATIENT)
Dept: OTHER | Age: 60
End: 2020-06-02

## 2020-06-02 NOTE — PROGRESS NOTES
6/2/2020, Per chart review, no further ED/Hospitalization, keeping F/U appts and working with Pharmacy/Be Well with Diabetes. Reached associate at listed phone number, HIPAA verified. Offered congratulations on JOSE L and discussed CCM opportunities to continue calls/personal goal work. Mr Price Benoit expressed appreciation of calls/support, however, denies need for CCM at this time, back to work and has good relationship with PCP and support of Pharmacy/Be Well with Diabetes. Mr Price Benoit agrees to keep [facility] contact information to call with any change, concerns or need for CCM assistance. Resolving current episode of JOSE L/ case management. Patient has met patient stated and/or medical goals. Patient consistenly demonstrates understanding of the medical action plan through execution of plan. Appointments with key providers are scheduled and attended. Plan of care is modified and updated to address new challenges and barriers with minimal support from the CM team(proactively uses physicians and community resources) The support system remains current and has been modified as needed. Patient continues to acquire needed resources from the current support system established. Teach back demonstrates that patient understands education for self management of chronic conditions. Patient consistenly communicates understanding of signs,symptoms and complications for all major diagnoses. Patient modifies his/her lifestyle toreduce or avoid risk factors to his/her health. ECM will follow as needed and patient has ECM contact information for future needs.

## 2020-07-08 ENCOUNTER — TELEPHONE (OUTPATIENT)
Dept: PHARMACY | Age: 60
End: 2020-07-08

## 2020-07-08 NOTE — TELEPHONE ENCOUNTER
[address] [place] [address] Diabetes Management Program  =================================================================  Phil Zuniga is a 63 y.o. male enrolled in the [address] [place] [address]) Employee Diabetes Management Program.     Identified care gap(s): A1c over 9%, medication check in    Had [facility] visit 5/19/20  - covered Rxs sent to [facility]  - started [employer]  - changed from Victoza to Travon Aly    DM Program Prescriptions:  Medication Sig    hydroCHLOROthiazide (HYDRODIURIL) 25 mg tablet Take 1 Tab by mouth daily. - confirm filled 6/2/20 by [facility]    metFORMIN (GLUCOPHAGE) 500 mg tablet Take 1 Tab by mouth two (2) times daily (with meals). - confirm filled 5/1/20 by [facility]    aspirin 81 mg chewable tablet Take 1 Tab by mouth daily. - confirm filled 5/21/20 by [facility]    Blood-Glucose Meter GENOVEVA [facility] Wireless 2 Meter kit) monitoring kit Use as directed to monitor blood sugars  - confirm filled 5/21/20 by [facility]    glucose blood VI test strips [facility]) strip Use to test blood sugar 2 times daily  - confirm filled 5/21/20 by [facility]    lancets misc Use to test blood sugar 2 times daily  - confirm filled 5/21/20 by [facility]    Blood Glucose Control, High soln Use to complete control solution test  - confirm filled 5/21/20 by [facility]    semaglutide (Ozempic) 1 mg/dose (2 mg/1.5 mL) sub-q pen 1 mg by SubCUTAneous route every seven (7) days.   - confirm filled 5/21/20 x 28 ds (3mL) by [facility]    Insulin Needles, Disposable, (PEN NEEDLE) 32 gauge x 5/32\" ndle Use with victoza  - on free pen needles, but no longer needs if switched from Victoza to Ozempic      Pertinent Labs:  Lab Results   Component Value Date/Time    Hemoglobin A1c 10.6 (H) 04/28/2020 04:28 AM    Hemoglobin A1c 9.5 (H) 01/22/2020 10:10 AM Hemoglobin A1c 8.6 (H) 07/27/2017 08:36 AM    Hemoglobin A1c (POC) 7.7 (A) 06/06/2019 08:57 AM    Hemoglobin A1c (POC) 7.9 03/07/2019 08:54 AM    Hemoglobin A1c (POC) 8.3 02/06/2019 02:32 PM     Lab Results   Component Value Date/Time    Creatinine 0.81 04/29/2020 03:00 AM    GFR est AA >60 04/29/2020 03:00 AM    GFR est non-AA >60 04/29/2020 03:00 AM     Estimated Creatinine Clearance: 158.9 mL/min (by C-G formula based on SCr of 0.81 mg/dL). Provider: Guerline Montenegro MD. Simon Dao 5/12/20, Next OV to be scheduled (appears due @7/12/20)    Outcomes/Assessment:  - Glycemic Goal: <7.0%. Is not at blood glucose goal. Current regimen metformin 500 mg BID, Ozempic 1 mg once weekly; Victoza 1.8 mg daily changed to Ozempic 1 mg daily @5/20/20 to assist with adherence. Previously not adhering to diabetic diet. · Reduce Pill Hart: n/a  · Therapy Optimization: can continue to titrate metformin as needed/tolerated; future consideration to initiate SGLT2i with cardiovascular benefit (PVD/CAD per problem list but pt denied history during 5/19/20 [facility] [place] [facility] visit?) - Peggy Cutting is covered option. Would like to maximize adherence with GLP-1RA  · Medication changes since last A1c: Victoza changed to Ozempic @5/20/20 to assist with adherence  · Medication adherence assessment:  · Metformin: 1/22/20, 5/1/20 x 90 ds (180 tabs) - slight gap but should have current supply  · Victoza: 1/22/20, 2/13/20, 5/1/20 x 30 ds (noted some denied claims due to step therapy) - gaps noted, per 5/19/20 [facility] visit pt reported troubles remembering to take daily injection  · Ozempic: 5/21/20 x 28 ds - may be overdue for refill, possibly used up Victoza and then switched? Will be coming due soon    Spoke with patient at home number listed to follow up. States his issue today was that he refilled his pill box and realized he had no amlodipine, he thought it would automatically be shipped from home delivery.  He called this morning to request refill, I do see approved claim 7/8/20. Re-emphasized no automatic refills from [address] [place] [address] delivery pharmacy and that he should call 7-10 days in advance to request refills. Pt states he has all other medications and no questions at this time. Has [employer] Jazz testing supplies but has not started using yet, still using up previous supply. Generally testing blood sugars once daily in morning - has been averaging around 130 which is \"pretty good\" for him. Says he is taking metformin BID, still using Victoza once daily and has not yet switched to Ozempic. Realizes he regularly misses doses of Victoza as he does not like taking in the morning so he puts off the injection and then forgets to take it. Says he has about 1.5 weeks left of the Victoza and then will plan to switch to the Ozempic. States he plans to follow up with PCP in August.    Pt had no further questions or concerns at this time. Fasting sugars seem improved however may be able to further improve once he switches to Ozempic. Pt aware to call back in meantime with any issues. He expressed appreciation for outreach.     Celina Larry, PharmD, [address] [place] [address] Pharmacist  Dept: toll free [phone], option 7    For Pharmacy Admin Tracking Only    PHSO: [facility] Patient?: Yes  Recommended intervention potential cost savings: 1  Time Spent (min): 30

## 2020-07-31 ENCOUNTER — TELEPHONE (OUTPATIENT)
Dept: FAMILY MEDICINE CLINIC | Age: 60
End: 2020-07-31

## 2020-08-03 ENCOUNTER — EMPLOYEE WELLNESS (OUTPATIENT)
Dept: OTHER | Facility: CLINIC | Age: 60
End: 2020-08-03

## 2020-08-03 LAB
CHOLEST SERPL-MCNC: 186 MG/DL
EST. AVERAGE GLUCOSE BLD GHB EST-MCNC: 212 MG/DL
GLUCOSE SERPL-MCNC: 177 MG/DL (ref 74–99)
HBA1C MFR BLD: 9 % (ref 4.2–5.6)
HDLC SERPL-MCNC: 36 MG/DL (ref 40–60)
LDLC SERPL CALC-MCNC: 113 MG/DL (ref 0–100)
TRIGL SERPL-MCNC: 185 MG/DL (ref ?–150)

## 2020-08-04 ENCOUNTER — VIRTUAL VISIT (OUTPATIENT)
Dept: FAMILY MEDICINE CLINIC | Age: 60
End: 2020-08-04

## 2020-08-04 DIAGNOSIS — L03.116 CELLULITIS OF LEFT LOWER EXTREMITY: Primary | ICD-10-CM

## 2020-08-04 RX ORDER — CEPHALEXIN 500 MG/1
500 CAPSULE ORAL 4 TIMES DAILY
Qty: 40 CAP | Refills: 0 | Status: SHIPPED | OUTPATIENT
Start: 2020-08-04 | End: 2020-08-18 | Stop reason: SDUPTHER
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
## 2020-08-05 ENCOUNTER — TELEPHONE (OUTPATIENT)
Dept: FAMILY MEDICINE CLINIC | Age: 60
End: 2020-08-05

## 2020-08-05 NOTE — TELEPHONE ENCOUNTER
Patient was contacted multiple times. Left voicemail on the first time patient was contacted informing him that the nurse still with the patient but will get it completed as soon as possible. The 4th call, patient answered and was informed patient that I spoke to someone named Kathleen and she informed me that their system is down that's why they cannot receive any e-scribe. I apologized to the patient and informed him that error was not made in our end. Pt then stated that he had been trying to call and he was aware that his pharmacy's system is down. He also stated that he kept getting hung up on. I then informed him that I was not sure what happened but the office was not hanging up on him and may have been had to do with the phone issue yesterday. I apologized to the patient for the inconvenience and informed him that everything is taken care of and verbal approval was completed. Pt sounded calmed and verbalized understanding.

## 2020-08-05 NOTE — TELEPHONE ENCOUNTER
Pt called in and informed me that his cephALEXin (KEFLEX) 500 mg capsule was never sent to the pharmacy yesterday. I informed him that it was sent yesterday but that I would let the nurse know and see if she could call in a verbal order. He then asked when he could expect the medication to be called in and I advised him that the doctor is seeing patient's currently and that when she was done with patient care she would go ahead and get that sent. Pt then stated that that is unacceptable as it was supposed to be called in yesterday and I apologized and again explained that patient care comes first and he said well I need to come first and I politely placed him on hold as he also began raising his voice to me. Spoke with yamila Fry and she took over call from me Please advise.

## 2020-08-05 NOTE — TELEPHONE ENCOUNTER
Nurse spoke to [facility]. VBO called into pharmacy. This encounter will be closed.

## 2020-08-05 NOTE — TELEPHONE ENCOUNTER
Pt was upset and stated that he is a [employer] employee and he does not appreciate how his being treated. Pt complained that his medication was never sent to his pharmacy. Pt also complained that he was put on hold for a long time. I then apologized to the patient that medication is already sent to his pharmacy and also [facility] was not picking up the phone when contacted. He then stated that it opens at [address] [place] [address]. I informed patient that I will call the pharmacy once it opens and if theres anything needs to be taken care of, it will be done before the end of the day. Pt became upset stating, \"I cannot wait until the end of the day\" I then informed him I did not say that and it will be done before the end of the day and will call the pharmacy once it opened. Pt was also advised that he will be contacted soon. Pt verbalized understanding and tolerated it well.

## 2020-08-06 ENCOUNTER — VIRTUAL VISIT (OUTPATIENT)
Dept: FAMILY MEDICINE CLINIC | Age: 60
End: 2020-08-06

## 2020-08-06 DIAGNOSIS — L03.116 LEFT LEG CELLULITIS: Primary | ICD-10-CM
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
## 2020-08-12 ENCOUNTER — VIRTUAL VISIT (OUTPATIENT)
Dept: FAMILY MEDICINE CLINIC | Age: 60
End: 2020-08-12

## 2020-08-12 DIAGNOSIS — G47.33 OBSTRUCTIVE SLEEP APNEA SYNDROME: ICD-10-CM

## 2020-08-12 DIAGNOSIS — L03.116 LEFT LEG CELLULITIS: Primary | ICD-10-CM

## 2020-08-12 DIAGNOSIS — E11.65 TYPE 2 DIABETES MELLITUS WITH HYPERGLYCEMIA, WITHOUT LONG-TERM CURRENT USE OF INSULIN (HCC): ICD-10-CM

## 2020-08-12 DIAGNOSIS — T63.461A WASP STING, ACCIDENTAL OR UNINTENTIONAL, INITIAL ENCOUNTER: ICD-10-CM

## 2020-08-12 DIAGNOSIS — I10 ESSENTIAL HYPERTENSION, MALIGNANT: ICD-10-CM

## 2020-08-12 DIAGNOSIS — R22.32 NODULE OF FINGER OF LEFT HAND: ICD-10-CM

## 2020-08-12 DIAGNOSIS — E66.01 MORBID OBESITY (HCC): ICD-10-CM

## 2020-08-12 NOTE — PROGRESS NOTES
Waqar Mendez is a 62 y.o.  male and presents with    Chief Complaint   Patient presents with   [address], who was evaluated through a synchronous (real-time) audio-video encounter, and/or his healthcare decision maker, is aware that it is a billable service, with coverage as determined by his insurance carrier. He provided verbal consent to proceed: Yes, and patient identification was verified. It was conducted pursuant to the emergency declaration under the [facility], [address] [place] [address] and the [employer] Act. A caregiver was present when appropriate. Ability to conduct physical exam was limited. I was in the office. The patient was at home. Subjective:  He is following up for lower leg cellulitis after bullous of the lower left leg; the blister was quarter size; he had infection several months ago which was treated as inpatient while he was treated for pneumonia; he started cephalexin 5 days ago. He has had improvement in redness. Edema has improved. He had wasp sting over the past week several times  Cardiovascular Review:  The patient has diabetes, hypertension, hyperlipidemia, coronary artery disease and obesity. Diet and Lifestyle: generally follows a low fat low cholesterol diet, generally follows a low sodium diet, follows a diabetic diet regularly, exercises regularly, nonsmoker  Home BP Monitoring: is well controlled at home, ranging 130's/85's. Pertinent ROS: taking medications as instructed, no medication side effects noted, no TIA's, no chest pain on exertion, no dyspnea on exertion, no swelling of ankles.   Diabetes Mellitus:  He has diabetes mellitus. Diabetic ROS - medication compliance: compliant all of the time, diabetic diet compliance: compliant most of the time, home glucose monitoring: is performed regularly.    Lab review: labs reviewed, I note that glycosylated hemoglobin mildly abnormal but acceptable.     ROS   Constitutional: Negative for appetite change, chills, diaphoresis, fatigue and fever. HENT: Negative for congestion and sore throat. Respiratory: Negative for cough. Cardiovascular: Negative for chest pain and palpitations. Gastrointestinal: Negative for nausea and vomiting. Genitourinary: Negative for dysuria. Musculoskeletal: Negative for back pain. Skin: Positive for wound. Neurological: Negative for dizziness and headaches. All other systems reviewed and are negative. Objective: There were no vitals filed for this visit. alert, well appearing, and in no distress, oriented to person, place, and time and morbidly obese  Mental status - normal mood, behavior, speech, dress, motor activity, and thought processes  Chest - normal work of breathing  Neurological - cranial nerves II through XII intact  Skin - redness decreased    LABS     TESTS      Assessment/Plan:    1. Left leg cellulitis  Complete course of antibiotics    2. Wasp sting, accidental or unintentional, initial encounter  Antihistamine as needed    3. Type 2 diabetes mellitus with hyperglycemia, without long-term current use of insulin ([facility])  Goal hgb a1c <7    4. Morbid obesity ([address]  I have reviewed/discussed the above normal BMI with the patient. I have recommended the following interventions: dietary management education, guidance, and counseling and encourage exercise . .      5. Obstructive sleep apnea syndrome  Referred to sleep medicine    6. Essential hypertension, malignant  Goal <130/80    7. Nodule of finger of left hand  F/u with ortho hand specialist; dr. Mckay Decker      Lab review: no lab studies available for review at time of visit      I have discussed the diagnosis with the patient and the intended plan as seen in the above orders. .  I have discussed medication side effects and warnings with the patient as well.  I have reviewed the plan of care with the patient, accepted their input and they are in agreement with the treatment goals.

## 2020-08-12 NOTE — PROGRESS NOTES
Fortunato Duckworth presents today for   Chief Complaint   Patient presents with    Skin Infection       Is someone accompanying this pt? na    Is the patient using any DME equipment during OV? na    Depression Screening:  3 most recent PHQ Screens 8/9/2018   Little interest or pleasure in doing things Not at all   Feeling down, depressed, irritable, or hopeless Not at all   Total Score PHQ 2 0       Learning Assessment:  Learning Assessment 3/19/2014   PRIMARY LEARNER Patient   HIGHEST LEVEL OF EDUCATION - PRIMARY LEARNER  [facility] PRIMARY LEARNER NONE   CO-LEARNER CAREGIVER No   PRIMARY LANGUAGE ENGLISH   LEARNER PREFERENCE PRIMARY READING     -     -   ANSWERED BY self   RELATIONSHIP SELF       Abuse Screening:  Abuse Screening Questionnaire 3/19/2014   Do you ever feel afraid of your partner? N   Are you in a relationship with someone who physically or mentally threatens you? N   Is it safe for you to go home? Y       Fall Risk  No flowsheet data found. Health Maintenance reviewed and discussed and ordered per Provider. Health Maintenance Due   Topic Date Due    Shingrix Vaccine Age 49> (1 of 2) 07/21/2011    Eye Exam Retinal or Dilated  03/06/2020    A1C test (Diabetic or Prediabetic)  07/28/2020    Influenza Age 5 to Adult  08/01/2020   . Coordination of Care:  1. Have you been to the ER, urgent care clinic since your last visit? Hospitalized since your last visit? no    2. Have you seen or consulted any other health care providers outside of the [address] [place] [address] since your last visit? Include any pap smears or colon screening.  no      Last  Checked na  Last UDS Checked na  Last Pain contract signed: na    Patients concerns today:  Left leg cellulitis

## 2020-08-18 DIAGNOSIS — L03.116 CELLULITIS OF LEFT LOWER EXTREMITY: ICD-10-CM

## 2020-08-18 RX ORDER — CEPHALEXIN 500 MG/1
500 CAPSULE ORAL 4 TIMES DAILY
Qty: 40 CAP | Refills: 0 | Status: SHIPPED | OUTPATIENT
Start: 2020-08-18 | End: 2020-09-04 | Stop reason: SDUPTHER

## 2020-08-21 ENCOUNTER — TELEPHONE (OUTPATIENT)
Dept: PHARMACY | Age: 60
End: 2020-08-21

## 2020-08-26 NOTE — TELEPHONE ENCOUNTER
Reached patient at home number. States on 2 other calls so did not have long to talk. Had questions about refilling meds - advised no automatic refills and [phone] [facility] 0 to speak with someone there to refill through DM program. Did not have time to discuss blood sugars, but pt stated things were \"fine\" with his DM program medications. Asked if he started Ozempic yet - pt states he is still using up Victoza and plans to start next week. Still suspect Victoza non-adherence. The goal of changing to Ozempic was to help with that, but pt wanted to use up supply of Victoza so has not yet started once weekly alternative. Encouraged patient to call back with any questions or issues regarding DM program Rxs or pharmacy benefit. Pt expressed appreciation for follow up.     For Pharmacy Admin Tracking Only    PHSO: PHSO Patient?: Yes  Recommended intervention potential cost savings: 1  Time Spent (min): Prashanth Britton 3, [facility], PharmD  [address]

## 2020-09-03 ENCOUNTER — TELEPHONE (OUTPATIENT)
Dept: FAMILY MEDICINE CLINIC | Age: 60
End: 2020-09-03

## 2020-09-03 NOTE — TELEPHONE ENCOUNTER
PT. Stated his leg is still infected and is requesting another prescription for antibiotic. He stated he busted his leg open again. Please advise.

## 2020-09-04 DIAGNOSIS — B35.1 ONYCHOMYCOSIS: ICD-10-CM

## 2020-09-04 DIAGNOSIS — L03.116 CELLULITIS OF LEFT LOWER EXTREMITY: ICD-10-CM

## 2020-09-04 RX ORDER — TERBINAFINE HYDROCHLORIDE 250 MG/1
TABLET ORAL
Qty: 30 TAB | Refills: 2 | Status: SHIPPED | OUTPATIENT
Start: 2020-09-04 | End: 2020-10-07

## 2020-09-04 RX ORDER — CEPHALEXIN 500 MG/1
500 CAPSULE ORAL 4 TIMES DAILY
Qty: 40 CAP | Refills: 0 | Status: SHIPPED | OUTPATIENT
Start: 2020-09-04 | End: 2020-09-14

## 2020-09-11 DIAGNOSIS — L03.116 LEFT LEG CELLULITIS: Primary | ICD-10-CM

## 2020-09-11 DIAGNOSIS — S81.802A NON-HEALING WOUND OF LOWER EXTREMITY, LEFT, INITIAL ENCOUNTER: ICD-10-CM

## 2020-10-01 ENCOUNTER — PATIENT OUTREACH (OUTPATIENT)
Dept: OTHER | Age: 60
End: 2020-10-01

## 2020-10-01 NOTE — PROGRESS NOTES
Per chart review, no further ED/Hospitalizations noted in >30days. Continues to keep PCP F/U appts, seen 8/12/2020. Be Well with Diabetes(4/28 A1C at 10.6 and 8/3 A1C at 9.0). 10/1/2020, Reached associate at listed phone number, HIPAA verified. Mr Stacey Ignacio allowed this writer/ACM to explain purpose of call, discuss need for further CM assistance with personal health goals/Diabetes. Mr Steve Hernandez understanding, states overall, doing well, has completed BE WELL assessment and screening, continues to work on Management Program and with Pharmacy Program. Mr Ibarra Manual states since infection, continues to struggle with full diabetes control, reports most fasting blood sugars >200. Instruction provided on importance of diabetes control to decrease risks of further complications and to report concerns to PCP/ENDO to discuss possible medication changes or recommendations. Mr Steve Hernandez understanding and agrees to continue to monitor and work with PCP/Be Well with Diabetes(4/28 A1C at 10.6 and 8/3 A1C at 9.0). Mr Stacey Ignacio denies need for ACM/CM assistance at this time, feels well supported and agrees to keep ACM contact information to call as needed.    See Previous JOSE L/documentation: S/P [facility] admitted on 04/27/2020 and discharged on 05/012020 for Respiratory Failure 2/2 Sepsis/CAP and Metabolic Encephalopathy

## 2020-10-07 ENCOUNTER — DOCUMENTATION ONLY (OUTPATIENT)
Dept: PHARMACY | Age: 60
End: 2020-10-07

## 2020-10-07 DIAGNOSIS — B35.1 ONYCHOMYCOSIS: ICD-10-CM

## 2020-10-07 RX ORDER — TERBINAFINE HYDROCHLORIDE 250 MG/1
TABLET ORAL
Qty: 90 TAB | Refills: 0 | Status: SHIPPED | OUTPATIENT
Start: 2020-10-07 | End: 2020-11-27

## 2020-10-07 NOTE — PROGRESS NOTES
The application for Johan Colunga for enrollment into the diabetes management program has been reviewed and accepted on 10/07/20.     Renetta Ta

## 2020-10-08 NOTE — ADDENDUM NOTE
Addendum  created 12/17/18 1012 by John Garrett MD    [address] filed
dr jones anesthesia, hali echo

## 2020-10-09 DIAGNOSIS — R29.898 DECREASED GRIP STRENGTH: Primary | ICD-10-CM

## 2020-10-26 ENCOUNTER — OFFICE VISIT (OUTPATIENT)
Dept: ORTHOPEDIC SURGERY | Age: 60
End: 2020-10-26
Payer: COMMERCIAL

## 2020-10-26 VITALS
SYSTOLIC BLOOD PRESSURE: 153 MMHG | RESPIRATION RATE: 16 BRPM | BODY MASS INDEX: 40.43 KG/M2 | HEIGHT: 74 IN | HEART RATE: 79 BPM | WEIGHT: 315 LBS | OXYGEN SATURATION: 94 % | TEMPERATURE: 97 F | DIASTOLIC BLOOD PRESSURE: 92 MMHG

## 2020-10-26 DIAGNOSIS — M67.442 MUCOUS CYST OF DIGIT OF LEFT HAND: Primary | ICD-10-CM

## 2020-10-26 DIAGNOSIS — M15.1 DEGENERATIVE ARTHRITIS OF DISTAL INTERPHALANGEAL JOINT OF INDEX FINGER OF LEFT HAND: ICD-10-CM

## 2020-10-26 DIAGNOSIS — M15.8 DEGENERATIVE ARTHRITIS OF INTERPHALANGEAL JOINT OF LEFT THUMB: ICD-10-CM

## 2020-10-26 PROCEDURE — 99203 OFFICE O/P NEW LOW 30 MIN: CPT | Performed by: ORTHOPAEDIC SURGERY

## 2020-10-26 PROCEDURE — 73130 X-RAY EXAM OF HAND: CPT | Performed by: ORTHOPAEDIC SURGERY
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
## 2020-11-23 DIAGNOSIS — B35.1 ONYCHOMYCOSIS: ICD-10-CM

## 2020-11-27 RX ORDER — TERBINAFINE HYDROCHLORIDE 250 MG/1
TABLET ORAL
Qty: 30 TAB | Refills: 2 | Status: SHIPPED | OUTPATIENT
Start: 2020-11-27 | End: 2021-04-20

## 2020-12-01 ENCOUNTER — OFFICE VISIT (OUTPATIENT)
Dept: FAMILY MEDICINE CLINIC | Age: 60
End: 2020-12-01
Payer: COMMERCIAL

## 2020-12-01 VITALS
RESPIRATION RATE: 16 BRPM | SYSTOLIC BLOOD PRESSURE: 147 MMHG | BODY MASS INDEX: 40.43 KG/M2 | TEMPERATURE: 98 F | HEART RATE: 86 BPM | DIASTOLIC BLOOD PRESSURE: 87 MMHG | WEIGHT: 315 LBS | OXYGEN SATURATION: 96 % | HEIGHT: 74 IN

## 2020-12-01 DIAGNOSIS — Z01.818 PREOP EXAMINATION: Primary | ICD-10-CM

## 2020-12-01 DIAGNOSIS — Z00.00 PHYSICAL EXAM: ICD-10-CM

## 2020-12-01 DIAGNOSIS — Z01.818 PRE-OP EXAM: ICD-10-CM

## 2020-12-01 DIAGNOSIS — D48.5 NEOPLASM OF UNCERTAIN BEHAVIOR OF SKIN OF BACK: Primary | ICD-10-CM

## 2020-12-01 DIAGNOSIS — E66.01 MORBID OBESITY (HCC): ICD-10-CM

## 2020-12-01 DIAGNOSIS — I10 ESSENTIAL HYPERTENSION, MALIGNANT: ICD-10-CM

## 2020-12-01 DIAGNOSIS — G47.33 OBSTRUCTIVE SLEEP APNEA SYNDROME: ICD-10-CM

## 2020-12-01 DIAGNOSIS — E11.65 TYPE 2 DIABETES MELLITUS WITH HYPERGLYCEMIA, WITHOUT LONG-TERM CURRENT USE OF INSULIN (HCC): ICD-10-CM

## 2020-12-01 PROCEDURE — 21930 EXC BACK LES SC < 3 CM: CPT | Performed by: FAMILY MEDICINE

## 2020-12-01 PROCEDURE — 93000 ELECTROCARDIOGRAM COMPLETE: CPT | Performed by: FAMILY MEDICINE

## 2020-12-01 PROCEDURE — 3052F HG A1C>EQUAL 8.0%<EQUAL 9.0%: CPT | Performed by: FAMILY MEDICINE

## 2020-12-01 PROCEDURE — 99396 PREV VISIT EST AGE 40-64: CPT | Performed by: FAMILY MEDICINE

## 2020-12-01 NOTE — PROGRESS NOTES
Valeria Pickering. presents today for   Chief Complaint   Patient presents with    Mole       Is someone accompanying this pt? no    Is the patient using any DME equipment during OV? no    Depression Screening:  3 most recent PHQ Screens 12/1/2020   Little interest or pleasure in doing things Not at all   Feeling down, depressed, irritable, or hopeless Not at all   Total Score PHQ 2 0       Learning Assessment:  Learning Assessment 3/19/2014   PRIMARY LEARNER Patient   HIGHEST LEVEL OF EDUCATION - PRIMARY LEARNER  2 YEARS [facility] PRIMARY LEARNER NONE   CO-LEARNER CAREGIVER No   PRIMARY LANGUAGE ENGLISH   LEARNER PREFERENCE PRIMARY READING     -     -   ANSWERED BY self   RELATIONSHIP SELF       Abuse Screening:  Abuse Screening Questionnaire 8/12/2020   Do you ever feel afraid of your partner? N   Are you in a relationship with someone who physically or mentally threatens you? N   Is it safe for you to go home? Y       Fall Risk  Fall Risk Assessment, last 12 mths 12/1/2020   Able to walk? Yes   Fall in past 12 months? No       Health Maintenance reviewed and discussed and ordered per Provider. Health Maintenance Due   Topic Date Due    Shingrix Vaccine Age 49> (1 of 2) 07/21/2011    Eye Exam Retinal or Dilated  03/06/2020    MICROALBUMIN Q1  10/16/2020    Lipid Screen  10/16/2020    A1C test (Diabetic or Prediabetic)  11/03/2020   . Coordination of Care:  1. Have you been to the ER, urgent care clinic since your last visit? Hospitalized since your last visit? no    2. Have you seen or consulted any other health care providers outside of the [address] [place] [address] since your last visit? Include any pap smears or colon screening. no      Last  Checked na  Last UDS Checked na  Last Pain contract signed: na    Patient presents in office today for routine care.   Patient concerns: mole on back

## 2020-12-01 NOTE — PROGRESS NOTES
Coreen Arreguin is a 62 y.o.  male and presents with    Chief Complaint   Patient presents with    Mole       Subjective: Well Adult Physical   Patient here for a comprehensive physical exam.The patient reports problems - lesion on back which has increased in size has been pruritic and has been bleeding. His wife is concerned due to the size and color. Do you take any herbs or supplements that were not prescribed by a doctor? no Are you taking calcium supplements? no Are you taking aspirin daily? not applicable  Cardiovascular Review:  The patient has diabetes, hypertension, hyperlipidemia, coronary artery disease and obesity. Diet and Lifestyle: generally follows a low fat low cholesterol diet, generally follows a low sodium diet, follows a diabetic diet regularly, exercises regularly, nonsmoker  Home BP Monitoring: is well controlled at home, ranging 130's/85's. Pertinent ROS: taking medications as instructed, no medication side effects noted, no TIA's, no chest pain on exertion, no dyspnea on exertion, no swelling of ankles.   Diabetes Mellitus:  He has diabetes mellitus. Diabetic ROS - medication compliance: compliant all of the time, diabetic diet compliance: compliant most of the time, home glucose monitoring: is performed regularly. Lab review: labs reviewed, I note that glycosylated hemoglobin mildly abnormal but acceptable.      ROS   Constitutional: Negative for appetite change, chills, diaphoresis, fatigue and fever. HENT: Negative for congestion and sore throat.    Respiratory: Negative for cough.    Cardiovascular: Negative for chest pain and palpitations. Gastrointestinal: Negative for nausea and vomiting. Genitourinary: Negative for dysuria. Musculoskeletal: Negative for back pain. Skin: Positive for lesion on back  Neurological: Negative for dizziness and headaches    All other systems reviewed and are negative.     Objective:  Vitals:    12/01/20 1105   BP: (!) 147/87   Pulse: 86   Resp: 16   Temp: 98 °F (36.7 °C)   TempSrc: Temporal   SpO2: 96%   Weight: 339 lb (153.8 kg)   Height: 6' 2\" (1.88 m)   PainSc:   0 - No pain     BMI 43.53 kg/m²       General appearance  alert, cooperative, no distress, appears stated age   Head  Normocephalic, without obvious abnormality, atraumatic   Eyes  conjunctivae/corneas clear. PERRL, EOM's intact. Ears  normal TM's and external ear canals AU   Nose Nares normal. Septum midline. Mucosa normal. No drainage or sinus tenderness. Throat Lips, mucosa, and tongue normal. Teeth and gums normal   Neck supple, symmetrical, trachea midline, no adenopathy, thyroid: not enlarged, symmetric, no tenderness/mass/nodules, no carotid bruit and no JVD   Back   symmetric, no curvature. ROM normal. No CVA tenderness   Lungs   clear to auscultation bilaterally   Chest wall  no tenderness   Heart  regular rate and rhythm, S1, S2 normal, no murmur, click, rub or gallop   Abdomen   soft, non-tender.  Bowel sounds normal. No masses,  No organomegaly   Genitalia  deferred   Rectal  deferred   Extremities extremities normal, atraumatic, no cyanosis or edema   Pulses 2+ and symmetric   Skin Brown lesion on back raised with irregular border   Lymph nodes Cervical, supraclavicular, and axillary nodes normal.   Neurologic Normal     [facility]  OFFICE PROCEDURE PROGRESS NOTE        Chart reviewed for the following:   Katiana Vega MD, have reviewed the History, Physical and updated the Allergic reactions for Tae R [address] performed immediately prior to start of procedure:   Jerzy DAVALOS MD, have performed the following reviews on Tae R Merlin All. prior to the start of the procedure:            * Patient was identified by name and date of birth   * Agreement on procedure being performed was verified  * Risks and Benefits explained to the patient  * Procedure site verified and marked as necessary  * Patient was positioned for comfort  * Understanding confirmed and consent was signed and verified     Time: .12:02 PM       Date of procedure: 12/1/2020    Procedure performed by:  Ki Tripathi MD    Provider assisted by: Sapna Jhaveri LPN    Patient assisted by: self    How tolerated by patient: tolerated the procedure well with no complications    Comments: none      SUBJECTIVE:   We have  discussed this procedure, including option of not performing surgery, technique of surgery and potential for scarring at a recent visit. PLAN:   After informed consent was obtained, using Betadine for cleansing and 1% Lidocaine with epinephrine for anesthetic, with sterile technique, shave excision was performed. Antibiotic dressing is applied, and wound care instructions provided. Be alert for any signs of cutaneous infection. The procedure was well tolerated without complications. Follow up: the specimen is labeled and sent to pathology for evaluation. LABS   hgb a1c 9 improved to 8.7  TESTS      Assessment/Plan:    1. Neoplasm of uncertain behavior of skin of back  Sent for biopsy  - EXCISION TUMOR SOFT TISSUE BACK/FLANK SUBQ <3CM  - SURGICAL PATHOLOGY; Future    2. Physical exam  Reviewed preventive recommendations; pt needs to improve diet and exercise    3. Obstructive sleep apnea syndrome  Continue cpap    4. Morbid obesity ([address]  I have reviewed/discussed the above normal BMI with the patient. I have recommended the following interventions: dietary management education, guidance, and counseling and encourage exercise . 5. Type 2 diabetes mellitus with hyperglycemia, without long-term current use of insulin (HCC)  Goal hgb a1c <7; increase metformin to 1000 mg twice a day  - HEMOGLOBIN A1C WITH EAG; Future  - LIPID PANEL; Future  - MICROALBUMIN, UR, RAND W/ MICROALB/CREAT RATIO; Future    6. Pre op exam  Not cleared for surgery at this time due to poorly controlled diabetes.     Lab review: labs reviewed, I note that glycosylated hemoglobin abnormal high, orders written for new lab studies as appropriate; see orders      I have discussed the diagnosis with the patient and the intended plan as seen in the above orders. The patient has received an after-visit summary and questions were answered concerning future plans. I have discussed medication side effects and warnings with the patient as well. I have reviewed the plan of care with the patient, accepted their input and they are in agreement with the treatment goals.

## 2020-12-02 LAB
ALBUMIN/CREAT UR: 6 MG/G CREAT (ref 0–29)
BASOPHILS # BLD AUTO: 0 X10E3/UL (ref 0–0.2)
BASOPHILS NFR BLD AUTO: 0 %
BUN SERPL-MCNC: 17 MG/DL (ref 6–24)
BUN/CREAT SERPL: 19 (ref 9–20)
CALCIUM SERPL-MCNC: 8.8 MG/DL (ref 8.7–10.2)
CHLORIDE SERPL-SCNC: 102 MMOL/L (ref 96–106)
CHOLEST SERPL-MCNC: 178 MG/DL (ref 100–199)
CO2 SERPL-SCNC: 27 MMOL/L (ref 20–29)
CREAT SERPL-MCNC: 0.9 MG/DL (ref 0.76–1.27)
CREAT UR-MCNC: 260.9 MG/DL
EOSINOPHIL # BLD AUTO: 0.1 X10E3/UL (ref 0–0.4)
EOSINOPHIL NFR BLD AUTO: 1 %
ERYTHROCYTE [DISTWIDTH] IN BLOOD BY AUTOMATED COUNT: 14.3 % (ref 11.6–15.4)
EST. AVERAGE GLUCOSE BLD GHB EST-MCNC: 203 MG/DL
GLUCOSE SERPL-MCNC: 161 MG/DL (ref 65–99)
HBA1C MFR BLD: 8.7 % (ref 4.8–5.6)
HCT VFR BLD AUTO: 49.9 % (ref 37.5–51)
HDLC SERPL-MCNC: 38 MG/DL
HGB BLD-MCNC: 16.9 G/DL (ref 13–17.7)
IMM GRANULOCYTES # BLD AUTO: 0 X10E3/UL (ref 0–0.1)
IMM GRANULOCYTES NFR BLD AUTO: 1 %
INR PPP: 1 (ref 0.9–1.2)
INTERPRETATION, 910389: NORMAL
LDLC SERPL CALC-MCNC: 106 MG/DL (ref 0–99)
LYMPHOCYTES # BLD AUTO: 1.8 X10E3/UL (ref 0.7–3.1)
LYMPHOCYTES NFR BLD AUTO: 23 %
Lab: NORMAL
MCH RBC QN AUTO: 29.8 PG (ref 26.6–33)
MCHC RBC AUTO-ENTMCNC: 33.9 G/DL (ref 31.5–35.7)
MCV RBC AUTO: 88 FL (ref 79–97)
MICROALBUMIN UR-MCNC: 15.5 UG/ML
MONOCYTES # BLD AUTO: 0.7 X10E3/UL (ref 0.1–0.9)
MONOCYTES NFR BLD AUTO: 9 %
NEUTROPHILS # BLD AUTO: 5 X10E3/UL (ref 1.4–7)
NEUTROPHILS NFR BLD AUTO: 66 %
PLATELET # BLD AUTO: 213 X10E3/UL (ref 150–450)
POTASSIUM SERPL-SCNC: 4 MMOL/L (ref 3.5–5.2)
PROTHROMBIN TIME: 10.7 SEC (ref 9.1–12)
RBC # BLD AUTO: 5.67 X10E6/UL (ref 4.14–5.8)
SODIUM SERPL-SCNC: 142 MMOL/L (ref 134–144)
TRIGL SERPL-MCNC: 193 MG/DL (ref 0–149)
VLDLC SERPL CALC-MCNC: 34 MG/DL (ref 5–40)
WBC # BLD AUTO: 7.7 X10E3/UL (ref 3.4–10.8)

## 2020-12-03 ENCOUNTER — OFFICE VISIT (OUTPATIENT)
Dept: ORTHOPEDIC SURGERY | Age: 60
End: 2020-12-03

## 2020-12-03 ENCOUNTER — HOSPITAL ENCOUNTER (OUTPATIENT)
Dept: PREADMISSION TESTING | Age: 60
Discharge: HOME | End: 2020-12-03
Payer: COMMERCIAL

## 2020-12-03 VITALS
BODY MASS INDEX: 40.43 KG/M2 | DIASTOLIC BLOOD PRESSURE: 98 MMHG | HEART RATE: 77 BPM | SYSTOLIC BLOOD PRESSURE: 144 MMHG | TEMPERATURE: 97 F | RESPIRATION RATE: 16 BRPM | WEIGHT: 315 LBS | OXYGEN SATURATION: 96 % | HEIGHT: 74 IN

## 2020-12-03 DIAGNOSIS — M15.1 DEGENERATIVE ARTHRITIS OF DISTAL INTERPHALANGEAL JOINT OF INDEX FINGER OF LEFT HAND: ICD-10-CM

## 2020-12-03 DIAGNOSIS — Z01.818 PREOP EXAMINATION: ICD-10-CM

## 2020-12-03 DIAGNOSIS — M67.442 MUCOUS CYST OF DIGIT OF LEFT HAND: Primary | ICD-10-CM

## 2020-12-03 DIAGNOSIS — M15.8 DEGENERATIVE ARTHRITIS OF INTERPHALANGEAL JOINT OF LEFT THUMB: ICD-10-CM

## 2020-12-03 PROCEDURE — 36415 COLL VENOUS BLD VENIPUNCTURE: CPT

## 2020-12-03 PROCEDURE — 87635 SARS-COV-2 COVID-19 AMP PRB: CPT
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
## 2020-12-04 LAB — SARS-COV-2, COV2NT: NOT DETECTED

## 2020-12-07 RX ORDER — METFORMIN HYDROCHLORIDE 1000 MG/1
1000 TABLET ORAL 2 TIMES DAILY WITH MEALS
Qty: 180 TAB | Refills: 3 | Status: SHIPPED | OUTPATIENT
Start: 2020-12-07 | End: 2022-01-25 | Stop reason: SDUPTHER

## 2020-12-14 LAB
CPT CODES, 490044: NORMAL
CPT DISCLAIMER: NORMAL
CYTOLOGY SPEC DOC CYTO: NORMAL
DIAGNOSIS SYNOPSIS:: NORMAL
DX ICD CODE: NORMAL
DX ICD CODE: NORMAL
PATH REPORT.GROSS SPEC: NORMAL
PATH REPORT.MICROSCOPIC SPEC OTHER STN: NORMAL
PATH REPORT.RELEVANT HX SPEC: NORMAL
PATHOLOGIST NAME: NORMAL
PDF IMAGE, 807507: NORMAL
SPECIMEN SOURCE: NORMAL

## 2020-12-21 ENCOUNTER — PATIENT MESSAGE (OUTPATIENT)
Dept: PHARMACY | Age: 60
End: 2020-12-21

## 2020-12-21 NOTE — LETTER
[facility] [address] [facility] [address] Phone: toll free [phone] option 7 
 
 
 
Mr. Sree Shell. [address] [facility] [id number] Thanks so much for taking the first step towards better health.  
   
Congratulations! You have completed the requirements needed to maintain enrollment in the [facility] Diabetes Management Program for 2020 and you will automatically be re-enrolled into the [facility] Diabetes Management Program for 2021.  
   
You do not need to re-apply for 2021. Your waived copay allowance for specific medications and pharmacy-related supplies received via mail will reset to $600 on 1/01/2021.  
   
To maintain your benefit throughout the year and be eligible to receive the benefit next year, you must make sure you complete all the following ongoing requirements:  
   
          Requirements to be completed between Jan. 1 and June 30:  
   
1. Visit with your physician (First yearly visit) 2. Meet with a [facility] Team Member by phone or in person at a hospital location (This visit may be conducted prior to the start of the requirements period.) 3. First A1C  
   
          Requirements to be completed between July 1 and Dec. 31  
1. Visit with your physician (Second yearly visit) 2. Second A1C  
3. Flu vaccination  
   
          Requirements to be completed throughout year:  
   
1. Lipid panel (once yearly) 2. Urine albumin (once yearly) 3. Pneumonia Vaccination (as indicated) 4. Take diabetes medication as prescribed as well as cholesterol (Statin) or high blood pressure (ACE/ARB), if needed. 5. 70% adherence is required of diabetes medications 6. Provide documentation if cholesterol and/or high blood pressure medications are not needed.  
   
          Requirements if A1C is greater than 8 percent: Engage with a [address] [place] [address] diabetes educator at one of our hospital locations or an ambulatory care coordinator by phone, if your A1c is greater than 8 percent.  
   
You will have to submit documentation of completion of requirements if your Physician/diabetes educator/ambulatory care coordinator does not use the [facility] electronic charting system or if you have your lab/urine tests done outside of [facility] [place]. Return the documentation to [email or web address] or by fax at [phone]  
   
If requirements(s) are not met by the dates listed above you will be disqualified from the program and the credit valued at $600 towards your diabetic medications and supplies will be revoked. You will be able to reapply the following calendar year.  
[address] [facility] Team  
[phone] Option #7 Email: [email or web address] Fax Number: [phone]

## 2021-01-25 ENCOUNTER — TELEPHONE (OUTPATIENT)
Dept: PHARMACY | Age: 61
End: 2021-01-25

## 2021-01-25 NOTE — TELEPHONE ENCOUNTER
Called patient to schedule pharmacist appointment to discuss medications for Diabetes Management Program.      Scheduled for 2/2 at [address]  [facility] [place].   [address] [facility]  Toll free: [phone], option 7

## 2021-02-02 ENCOUNTER — TELEPHONE (OUTPATIENT)
Dept: PHARMACY | Age: 61
End: 2021-02-02

## 2021-02-02 RX ORDER — DOCUSATE SODIUM 100 MG/1
400 CAPSULE, LIQUID FILLED ORAL DAILY
COMMUNITY
End: 2022-03-15

## 2021-02-02 NOTE — TELEPHONE ENCOUNTER
POPULATION HEALTH CLINICAL PHARMACY REVIEW - Be Well with Diabetes    Luis Frederick is a 62 y.o. male enrolled in the [facility] Employee Diabetes Program. Patient provided Gerald Palm with verbal consent to remain in the program for this year. Medications:   Current Outpatient Medications    Medication Sig Notes    Ozempic 1 mg/dose (2 mg/1.5 mL) sub-q pen INJECT 1MG UNDER THE SKIN EVERY 7 DAYS     amLODIPine (NORVASC) 5 mg tablet TAKE 1 TABLET BY MOUTH DAILY.  naproxen (NAPROSYN) 375 mg tablet TAKE 1 TABLET TWICE DAILY WITH MEALS     metFORMIN (GLUCOPHAGE) 1,000 mg tablet Take 1 Tab by mouth two (2) times daily (with meals).  terbinafine HCL (LAMISIL) 250 mg tablet TAKE ONE TABLET BY MOUTH ONE TIME DAILY     hydroCHLOROthiazide (HYDRODIURIL) 25 mg tablet Take 1 Tab by mouth daily.  aspirin 81 mg chewable tablet Take 1 Tab by mouth daily.  Blood-Glucose Meter (L[employer] 2 Meter kit) monitoring kit Use as directed to monitor blood sugars     glucose blood VI test strips Jessybradley Culp) strip Use to test blood sugar 2 times daily     lancets misc Use to test blood sugar 2 times daily     Blood Glucose Control, High soln Use to complete control solution test     ondansetron (ZOFRAN ODT) 4 mg disintegrating tablet Take 1 Tab by mouth every eight (8) hours as needed for Nausea or Vomiting.  Insulin Needles, Disposable, (PEN NEEDLE) 32 gauge x 5/32\" ndle Use with victoza No longer needs; removed    mupirocin (BACTROBAN) 2 % ointment Apply  to affected area daily.      mometasone (ELOCON) 0.1 % lotion Apply to ear once a day as needed      Additional medication: docusate daily    Current Pharmacy: [facility]  Current testing supplies/frequency: Agamatrjared Culp at home, hasn't used yet; still has [place] supplies yet that he is using up  Pen needles/syringes: n/a, Ozempic comes with pen needles    Allergies:  No Known Allergies Vitals/Labs:  BP Readings from Last 3 Encounters:   12/03/20 (!) 144/98   12/01/20 (!) 147/87   10/26/20 (!) 153/92     Lab Results   Component Value Date/Time    Microalbumin/Creat ratio (mg/g creat) 8 10/16/2019 10:45 AM    Microalb/Creat ratio (ug/mg creat.) 6 12/01/2020 12:31 PM    Microalbumin,urine random 1.17 10/16/2019 10:45 AM     Lab Results   Component Value Date/Time    Hemoglobin A1c 8.7 (H) 12/01/2020 12:31 PM    Hemoglobin A1c 9.0 (H) 08/03/2020 10:12 AM    Hemoglobin A1c 10.6 (H) 04/28/2020 04:28 AM    Hemoglobin A1c (POC) 7.7 (A) 06/06/2019 08:57 AM    Hemoglobin A1c (POC) 7.9 03/07/2019 08:54 AM    Hemoglobin A1c (POC) 8.3 02/06/2019 02:32 PM     Lab Results   Component Value Date/Time    Cholesterol, total 178 12/01/2020 12:31 PM    HDL Cholesterol 38 (L) 12/01/2020 12:31 PM    LDL, calculated 106 (H) 12/01/2020 12:31 PM    LDL, calculated 113 (H) 08/03/2020 10:12 AM    VLDL, calculated 34 12/01/2020 12:31 PM    VLDL, calculated 33.2 10/16/2019 10:45 AM    Triglyceride 193 (H) 12/01/2020 12:31 PM    CHOL/HDL Ratio 5.1 (H) 10/16/2019 10:45 AM     ALT (SGPT)   Date Value Ref Range Status   04/28/2020 242 (H) 16 - 61 U/L Final     AST (SGOT)   Date Value Ref Range Status   04/28/2020 143 (H) 10 - 38 U/L Final     The ASCVD Risk score (Bindu Lunsford, et al., 2013) failed to calculate for the following reasons:    ASCVD risk score not calculated     Lab Results   Component Value Date/Time    Creatinine, POC 1.2 12/11/2013 12:27 PM    Creatinine 0.90 12/01/2020 12:31 PM     Estimated Creatinine Clearance: 138.5 mL/min (by C-G formula based on SCr of 0.9 mg/dL).     Immunizations:  Immunization History   Administered Date(s) Administered    (RETIRED) Pneumococcal Vaccine (Unspecified Type) 09/21/2011    Influenza Vaccine 09/18/2013, 10/15/2019, 10/19/2020    Influenza Vaccine Split 09/21/2011    Pneumococcal Polysaccharide (PPSV-23) 09/21/2011    TD Vaccine 05/09/2008    Tdap 07/27/2017      Social History:  Social History     Tobacco Use    Smoking status: Former Smoker     Quit date: 10/15/1993     Years since quittin.3    Smokeless tobacco: Never Used    Tobacco comment:    Substance Use Topics    Alcohol use: No     ASSESSMENT:  Initial Program Requirements (Y indicates has completed for the year, N indicates needs to be completed by 2021): No - Provider Visit for DM (1st)  No - A1c (1st)     Ongoing Program Requirements (Y indicates has completed for the year, N indicates needs to be completed by 2021): No - Provider Visit for DM (2nd)  No - ACC/diabetes educator visit (if A1c over 8%)  No - A1c (2nd)  No - Lipid panel  No - Urine microalbumin  Yes - Pneumococcal vaccination: Up to date, not needed again until   No - Influenza vaccination for 2021  n/a - Medication adherence over 70%; 84.08% 2020  Yes - On statin or contraindication(s) Active liver disease; unexplained persistent elevations of serum transaminases, or ALT over 3 times upper limit normal  Yes - On ACEi/ARB or contraindication(s) Normal blood pressure, urinary albumin-to-creatinine ratio, and eGFR . Discussed benefits if would need additional agent in future to help manage blood pressure    Current medications eligible for copay waiver, up to $600, through [employer]:  - amlodipine, aspirin (with prescription), hydrochlorothiazide, metformin, Ozempic  - Agamatrix Jazz test strips and lancets     Diabetes Care:    Type 2 DM   - Glycemic Goal: <7.0%. Is not at blood glucose goal. A1C 2020 8. 7%This was an improvement for patient AND his metformin dose was increased at that time. - Metformin increased 21 to metformin 1000 mg BID  - Switched from Victoza to [address] in 2020 to help increase adherence with the once weekly dosing. He states this has helped adherence. Takes every Monday morning. States he is in a good routine with his other oral medications as well.   - Home blood sugar records:  fasting range: 120-130; 127 this morning   Fasting average 120-130, today 127  - Eye exam current (within one year): states he is due for one, did not get last year due to [address] exam current (within one year): yes Dr. Walter Acharya and wound care specialist as well (was seeing for his leg)  - Medication compliance: compliant most of the time  - Current exercise: states he stays active; he states he walks around 2 miles per day and on the weekend even further. Some weight lifting     Last Refills:  Ozempic- 1/26 84 ds, 11/23/20 56 ds, 10/14/20 28 ds  Metformin 1000mg BID 12/8/20 90 ds, 500 mg 9/8/20 90 ds    PLAN:  - DM program gaps identified:   · Initial requirements: Provider visit with provider for DM (1st) and A1c (1st)   · Ongoing requirements: Provider visit for DM (2nd), ACC/diabetes educator visit (if A1c over 8%), A1c (2nd), Lipid panel, Urine microalbumin, Influenza vaccination for 3618-8544 and Medication adherence over 70%   - Education to patient: Addressed diet and exercise, Discussed foot care., Reminded to get yearly retinal exam. and Benefit/indication for ACE/ARB in patients with diabetes   - Follow up: PCP for identified gaps or as scheduled below  - Upcoming appointments:   No future appointments. Chelsey Cast, PharmD, [address]  Direct: [phone]  Department, toll free: [phone], option 7    CLINICAL PHARMACY CONSULT: MED RECONCILIATION/REVIEW ADDENDUM    For Pharmacy Admin Tracking Only    PHSO: PHSO Patient?: Yes  Total # of Interventions Recommended: Count: 1  - Updated Order #: 1 Updated Order Reason(s):  Other  Recommended intervention potential cost savings: 1  Total Interventions Accepted: 1  Time Spent (min): 40

## 2021-02-08 DIAGNOSIS — I10 ESSENTIAL HYPERTENSION, MALIGNANT: ICD-10-CM

## 2021-02-11 RX ORDER — AMLODIPINE BESYLATE 5 MG/1
TABLET ORAL
Qty: 90 TAB | Refills: 2 | Status: SHIPPED | OUTPATIENT
Start: 2021-02-11 | End: 2022-07-26 | Stop reason: SDUPTHER

## 2021-03-11 ENCOUNTER — PATIENT OUTREACH (OUTPATIENT)
Dept: OTHER | Age: 61
End: 2021-03-11
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
## 2021-03-22 ENCOUNTER — PATIENT OUTREACH (OUTPATIENT)
Dept: OTHER | Age: 61
End: 2021-03-22
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
## 2021-03-23 ENCOUNTER — PATIENT OUTREACH (OUTPATIENT)
Dept: OTHER | Age: 61
End: 2021-03-23

## 2021-03-23 NOTE — PROGRESS NOTES
3/23/2021, Returned call to associate, HIPAA verified, discussed participation in the [employer] with Diabetes and if any interest to speak to [employer] chief product officer. Mr Karina Rodriguez agrees to speak with [employer]/chief product officer and allows this writer/ACM to pass along the preferred phone #,   [phone]. Mr Karina Rodriguez denies any further questions or concerns at this time and agrees to keep ACM contact information to call as needed.

## 2021-04-16 DIAGNOSIS — B35.1 ONYCHOMYCOSIS: ICD-10-CM

## 2021-04-20 RX ORDER — TERBINAFINE HYDROCHLORIDE 250 MG/1
TABLET ORAL
Qty: 30 TAB | Refills: 2 | Status: SHIPPED | OUTPATIENT
Start: 2021-04-20 | End: 2021-08-08

## 2021-04-20 RX ORDER — NAPROXEN 375 MG/1
TABLET ORAL
Qty: 180 TAB | Refills: 3 | Status: SHIPPED | OUTPATIENT
Start: 2021-04-20 | End: 2022-07-08

## 2021-06-03 ENCOUNTER — TELEPHONE (OUTPATIENT)
Dept: PHARMACY | Age: 61
End: 2021-06-03

## 2021-06-03 NOTE — TELEPHONE ENCOUNTER
Stevie Pacheco MD  - would patient benefit from ACEI due to history of DM and elevated BP  - could do combination tablet of lisinopril/hctz which is covered at no cost through DM program  - Arch Miss is also covered at no cost if patient's next A1c is still not at goal    Thank you,  Frank MenjivarD, [address] Pharmacist  Direct: [phone]  Department: [phone], option 7  ==================================================================================  POPULATION HEALTH CLINICAL PHARMACY REVIEW - Be Well with Diabetes    Mark Cotton is a 62 y.o. male enrolled in the [address] [place], [place] [address]Th [facility] Be Well with Diabetes Program.. The goal of this voluntary program is to help employees and covered dependents reach their health maintenance goals in regards to their diabetes diagnosis. According to our records, patient is missing the following requirement(s) that must be completed by July 1, 2021 to avoid discharge from the program:     First diabetes visit with your physician in 2021    First A1c result in 2021     *Documentation of any requirements completed or reviewed outside of the [facility] record (and not available in [facility]) will need to be faxed or emailed to     Plan:    Attempt made to reach patient by telephone to review above. Spoke to patient - verbalized understanding. Patient states he will call his doctor today to schedule DM visit. Metformin was increased to 1000 mg BID in Dec- is over due to fill- check on adherence. Need new A1c for further recommendations since no new A1c since med change.      Last BP's in the system were high- may need to consider ACEI    Rosemary Gold PharmD, [address] Pharmacist  Direct: [phone]  Department: [phone], option 7  ===============================================================  For Pharmacy Admin Tracking Only     Gap Closed?: Yes   Time Spent (min): 5

## 2021-06-09 NOTE — TELEPHONE ENCOUNTER
Thank you doctor. Patient has made apt with you for next week and I am hoping you can discuss lisinopril/hctz and synjardy if needed. Thanks!    Luz Ng, PharmD, [address] [place] [address] Clinical Pharmacist  Direct: [phone]  Department: [phone], option 7

## 2021-06-17 ENCOUNTER — OFFICE VISIT (OUTPATIENT)
Dept: FAMILY MEDICINE CLINIC | Age: 61
End: 2021-06-17
Payer: COMMERCIAL

## 2021-06-17 VITALS
HEART RATE: 82 BPM | HEIGHT: 74 IN | RESPIRATION RATE: 16 BRPM | WEIGHT: 315 LBS | BODY MASS INDEX: 40.43 KG/M2 | DIASTOLIC BLOOD PRESSURE: 88 MMHG | SYSTOLIC BLOOD PRESSURE: 127 MMHG | OXYGEN SATURATION: 96 % | TEMPERATURE: 98.1 F

## 2021-06-17 DIAGNOSIS — E66.01 MORBID OBESITY (HCC): ICD-10-CM

## 2021-06-17 DIAGNOSIS — I73.9 PERIPHERAL VASCULAR DISEASE (HCC): ICD-10-CM

## 2021-06-17 DIAGNOSIS — I10 ESSENTIAL HYPERTENSION, MALIGNANT: Primary | ICD-10-CM

## 2021-06-17 DIAGNOSIS — E11.51 TYPE 2 DIABETES MELLITUS WITH PERIPHERAL VASCULAR DISEASE (HCC): ICD-10-CM

## 2021-06-17 DIAGNOSIS — E11.65 TYPE 2 DIABETES MELLITUS WITH HYPERGLYCEMIA, WITHOUT LONG-TERM CURRENT USE OF INSULIN (HCC): ICD-10-CM

## 2021-06-17 PROCEDURE — 99214 OFFICE O/P EST MOD 30 MIN: CPT | Performed by: FAMILY MEDICINE

## 2021-06-17 RX ORDER — SEMAGLUTIDE 1.34 MG/ML
1 INJECTION, SOLUTION SUBCUTANEOUS
Qty: 3 BOX | Refills: 4 | Status: SHIPPED | OUTPATIENT
Start: 2021-06-17 | End: 2022-03-15 | Stop reason: SDUPTHER

## 2021-06-17 RX ORDER — BLOOD SUGAR DIAGNOSTIC
STRIP MISCELLANEOUS
Qty: 200 STRIP | Refills: 3 | Status: SHIPPED | OUTPATIENT
Start: 2021-06-17 | End: 2022-07-08

## 2021-06-17 RX ORDER — LANCETS
EACH MISCELLANEOUS
Qty: 200 EACH | Refills: 3 | Status: SHIPPED | OUTPATIENT
Start: 2021-06-17 | End: 2022-07-08

## 2021-06-17 RX ORDER — GUAIFENESIN 100 MG/5ML
81 LIQUID (ML) ORAL DAILY
Qty: 90 TABLET | Refills: 3 | Status: SHIPPED | OUTPATIENT
Start: 2021-06-17

## 2021-06-17 RX ORDER — HYDROCHLOROTHIAZIDE 25 MG/1
25 TABLET ORAL DAILY
Qty: 90 TABLET | Refills: 3 | Status: SHIPPED | OUTPATIENT
Start: 2021-06-17 | End: 2021-11-17

## 2021-06-17 NOTE — PROGRESS NOTES
Rodriguephilly Steven. presents today for   Chief Complaint   Patient presents with    Diabetes    Sleep Apnea    Cough       Is someone accompanying this pt? no    Is the patient using any DME equipment during OV? no    Depression Screening:  3 most recent PHQ Screens 6/17/2021   Little interest or pleasure in doing things Not at all   Feeling down, depressed, irritable, or hopeless Not at all   Total Score PHQ 2 0       Learning Assessment:  Learning Assessment 3/19/2014   PRIMARY LEARNER Patient   HIGHEST LEVEL OF EDUCATION - PRIMARY LEARNER  2 YEARS [facility] PRIMARY LEARNER NONE   CO-LEARNER CAREGIVER No   PRIMARY LANGUAGE ENGLISH   LEARNER PREFERENCE PRIMARY READING     -     -   ANSWERED BY self   RELATIONSHIP SELF       Abuse Screening:  Abuse Screening Questionnaire 8/12/2020   Do you ever feel afraid of your partner? N   Are you in a relationship with someone who physically or mentally threatens you? N   Is it safe for you to go home? Y       Fall Risk  Fall Risk Assessment, last 12 mths 12/1/2020   Able to walk? Yes   Fall in past 12 months? No       Health Maintenance reviewed and discussed and ordered per Provider. Health Maintenance Due   Topic Date Due    COVID-19 Vaccine (1) Never done    Shingrix Vaccine Age 50> (1 of 2) Never done    Eye Exam Retinal or Dilated  03/06/2020    Foot Exam Q1  01/22/2021   . Coordination of Care:  1. Have you been to the ER, urgent care clinic since your last visit? Hospitalized since your last visit? no    2. Have you seen or consulted any other health care providers outside of the [address] [place] [address] since your last visit? Include any pap smears or colon screening. no      Last  Checked na  Last UDS Checked na  Last Pain contract signed: na    Patient presents in office today for routine care. Patient concerns: lab work due, and a cough for 2 months.

## 2021-06-17 NOTE — PROGRESS NOTES
Bren Camacho is a 62 y.o.  male and presents with    Chief Complaint   Patient presents with    Diabetes    Sleep Apnea    Cough     Subjective:  Cardiovascular Review:  The patient has diabetes, hypertension, hyperlipidemia, coronary artery disease and obesity. Diet and Lifestyle: generally follows a low fat low cholesterol diet, generally follows a low sodium diet, follows a diabetic diet regularly, exercises regularly, nonsmoker  Home BP Monitoring: is well controlled at home, ranging 130's/85's. Pertinent ROS: taking medications as instructed, no medication side effects noted, no TIA's, no chest pain on exertion, no dyspnea on exertion, no swelling of ankles.   Diabetes Mellitus:  He has diabetes mellitus. Diabetic ROS - medication compliance: compliant all of the time, diabetic diet compliance: compliant most of the time, home glucose monitoring: is performed regularly. He reports that he has average 110  Lab review: labs reviewed, I note that glycosylated hemoglobin mildly abnormal but acceptable.      ROS   Constitutional: Negative for appetite change, chills, diaphoresis, fatigue and fever. HENT: Negative for congestion and sore throat.    Respiratory: Negative for cough.    Cardiovascular: Negative for chest pain and palpitations. Gastrointestinal: Negative for nausea and vomiting. Genitourinary: Negative for dysuria. Musculoskeletal: Negative for back pain. Skin: Positive for lesion on back  Neurological: Negative for dizziness and headaches     All other systems reviewed and are negative.     Objective:  Vitals:    06/17/21 1030   BP: 127/88   Pulse: 82   Resp: 16   Temp: 98.1 °F (36.7 °C)   TempSrc: Temporal   SpO2: 96%   Weight: 338 lb (153.3 kg)   Height: 6' 2\" (1.88 m)   PainSc:   0 - No pain       alert, well appearing, and in no distress, oriented to person, place, and time and morbidly obese  Mental status - normal mood, behavior, speech, dress, motor activity, and thought processes  Chest - clear to auscultation, no wheezes, rales or rhonchi, symmetric air entry  Heart - normal rate, regular rhythm, normal S1, S2, no murmurs, rubs, clicks or gallops  Neurological - cranial nerves II through XII intact    Diabetic foot exam:     Left Foot:   Visual Exam: normal    Pulse DP: 2+ (normal)   Filament test: normal sensation       Right Foot:   Visual Exam: normal    Pulse DP: 2+ (normal)   Filament test: normal sensation     LABS     TESTS      Assessment/Plan:    1. Type 2 diabetes mellitus with hyperglycemia, without long-term current use of insulin ([facility])  Goal hgb a1c <7; assess for improvement  -  DIABETES FOOT EXAM  - HEMOGLOBIN A1C WITH EAG; Future  - glucose blood VI test strips [facility] strip; Use to test blood sugar 2 times daily  Dispense: 200 Strip; Refill: 3  - lancets misc; Use to test blood sugar 2 times daily  Dispense: 200 Each; Refill: 3    2. Peripheral vascular disease ([facility]  Continue treatment to control blood pressure and keep platelet function blocked    3. Morbid obesity ([facility]  I have reviewed/discussed the above normal BMI with the patient. I have recommended the following interventions: dietary management education, guidance, and counseling, encourage exercise and monitor weight . .        4. Essential hypertension, malignant  Goal <130/80  - hydroCHLOROthiazide (HYDRODIURIL) 25 mg tablet; Take 1 Tablet by mouth daily. Dispense: 90 Tablet; Refill: 3  - aspirin 81 mg chewable tablet; Take 1 Tablet by mouth daily. Dispense: 90 Tablet; Refill: 3      Lab review: orders written for new lab studies as appropriate; see orders      I have discussed the diagnosis with the patient and the intended plan as seen in the above orders. The patient has received an after-visit summary and questions were answered concerning future plans. I have discussed medication side effects and warnings with the patient as well.  I have reviewed the plan of care with the patient, accepted their input and they are in agreement with the treatment goals.

## 2021-06-18 LAB
EST. AVERAGE GLUCOSE BLD GHB EST-MCNC: 174 MG/DL
HBA1C MFR BLD: 7.7 % (ref 4.8–5.6)

## 2021-08-07 DIAGNOSIS — B35.1 ONYCHOMYCOSIS: ICD-10-CM

## 2021-08-08 RX ORDER — TERBINAFINE HYDROCHLORIDE 250 MG/1
TABLET ORAL
Qty: 30 TABLET | Refills: 2 | Status: SHIPPED | OUTPATIENT
Start: 2021-08-08 | End: 2021-11-17

## 2021-08-12 ENCOUNTER — PATIENT MESSAGE (OUTPATIENT)
Dept: PHARMACY | Age: 61
End: 2021-08-12

## 2021-08-12 NOTE — LETTER
[address]  [address] [facility]  Phone: toll free [phone] Option #3        Mr. Jesus Escudero. [address]        This is a friendly message to advise that the phone number for the [facility] Be Well With Diabetes Program has changed. The new phone number is [phone] Option #3. Please be sure to contact us at [phone] Option #3 for any questions or concerns regarding the [facility] Be Well With Diabetes Program.       T[facility] [address] Team   [phone] Option #3   Email: [email or web address]   Fax Number: [phone]

## 2021-08-26 NOTE — TELEPHONE ENCOUNTER
[email or web address] message not read by patient. Letter mailed.     Fracisco Juan, [address]   Department, toll free: [phone] Option #3

## 2021-09-16 LAB
CHOLEST SERPL-MCNC: 193 MG/DL
GLUCOSE SERPL-MCNC: 123 MG/DL (ref 74–99)
HDLC SERPL-MCNC: 42 MG/DL (ref 40–60)
LDLC SERPL CALC-MCNC: 132 MG/DL (ref 0–100)
TRIGL SERPL-MCNC: 95 MG/DL (ref ?–150)

## 2021-12-16 ENCOUNTER — TELEPHONE (OUTPATIENT)
Dept: PHARMACY | Age: 61
End: 2021-12-16

## 2021-12-16 NOTE — TELEPHONE ENCOUNTER
[address] [facility] Employee Diabetes Program    Tae Ang. is a 62 y.o. male enrolled in the [facility] Diabetes Program. The goal of this voluntary program is to help employees and covered dependents reach their health maintenance goals in regards to their diabetes diagnosis. According to our records, patient is missing the following requirement(s) that must be completed by December 31, 2021 to avoid discharge from the program:     Second diabetes visit with your physician in 2021   Second A1c result in 2021   Urine protein/microalbumin    Plan:    Attempt made to reach patient by telephone to review above. Spoke to patient - verbalized understanding. Has to cancel dr visit due to covid exposure. Patient was exposed to someone who was positive so pcp wanted to move his appt to Jan. He states he also had A1c from be well but all I see is lipid panel from be well. Patient assumed be well did a1c. He tried to get appt in Dec but pcp couldn't get appt until Jan 24th. Fasting was 110 this morning, yesterday was 129. Generally 120-125. He has changed his diet also and stopped sweets and coffee all day. Will over ride 2nd visit- has appt Jan 25th  Will over ride 2nd A1c- he asked be well ppl to do it and they did not  Will over ride urine micro- would have been ordered at Dec 16th he had scheduled but had to cancel due to covid exposure.      Patient thanked us and states he really likes this helpful program!    Jesusita Martin, PharmD, [address] Pharmacist  Department: [phone]    For Pharmacy Admin Tracking Only     Gap Closed?: Yes   Time Spent (min): 15

## 2022-01-25 ENCOUNTER — OFFICE VISIT (OUTPATIENT)
Dept: FAMILY MEDICINE CLINIC | Age: 62
End: 2022-01-25
Payer: COMMERCIAL

## 2022-01-25 VITALS
SYSTOLIC BLOOD PRESSURE: 159 MMHG | HEART RATE: 86 BPM | BODY MASS INDEX: 40.43 KG/M2 | OXYGEN SATURATION: 96 % | DIASTOLIC BLOOD PRESSURE: 88 MMHG | TEMPERATURE: 97.4 F | RESPIRATION RATE: 16 BRPM | HEIGHT: 74 IN | WEIGHT: 315 LBS

## 2022-01-25 DIAGNOSIS — B37.2 SKIN CANDIDIASIS: ICD-10-CM

## 2022-01-25 DIAGNOSIS — E11.51 TYPE 2 DIABETES MELLITUS WITH PERIPHERAL VASCULAR DISEASE (HCC): ICD-10-CM

## 2022-01-25 DIAGNOSIS — E11.65 TYPE 2 DIABETES MELLITUS WITH HYPERGLYCEMIA, WITHOUT LONG-TERM CURRENT USE OF INSULIN (HCC): ICD-10-CM

## 2022-01-25 DIAGNOSIS — Z23 ENCOUNTER FOR IMMUNIZATION: ICD-10-CM

## 2022-01-25 DIAGNOSIS — Z00.00 ANNUAL PHYSICAL EXAM: ICD-10-CM

## 2022-01-25 DIAGNOSIS — L30.4 INTERTRIGO: ICD-10-CM

## 2022-01-25 DIAGNOSIS — I73.9 PERIPHERAL VASCULAR DISEASE (HCC): ICD-10-CM

## 2022-01-25 DIAGNOSIS — I10 ESSENTIAL HYPERTENSION, MALIGNANT: ICD-10-CM

## 2022-01-25 DIAGNOSIS — E66.01 MORBID OBESITY (HCC): ICD-10-CM

## 2022-01-25 DIAGNOSIS — Z80.0 FAMILY HISTORY OF COLON CANCER IN MOTHER: ICD-10-CM

## 2022-01-25 DIAGNOSIS — Z12.11 COLON CANCER SCREENING: Primary | ICD-10-CM

## 2022-01-25 DIAGNOSIS — E78.00 HYPERCHOLESTEROLEMIA: ICD-10-CM

## 2022-01-25 DIAGNOSIS — Z23 NEED FOR SHINGLES VACCINE: ICD-10-CM

## 2022-01-25 PROCEDURE — 90732 PPSV23 VACC 2 YRS+ SUBQ/IM: CPT | Performed by: FAMILY MEDICINE

## 2022-01-25 PROCEDURE — 90750 HZV VACC RECOMBINANT IM: CPT | Performed by: FAMILY MEDICINE

## 2022-01-25 PROCEDURE — 99396 PREV VISIT EST AGE 40-64: CPT | Performed by: FAMILY MEDICINE

## 2022-01-25 PROCEDURE — 90471 IMMUNIZATION ADMIN: CPT | Performed by: FAMILY MEDICINE

## 2022-01-25 PROCEDURE — 3051F HG A1C>EQUAL 7.0%<8.0%: CPT | Performed by: FAMILY MEDICINE

## 2022-01-25 PROCEDURE — 90472 IMMUNIZATION ADMIN EACH ADD: CPT | Performed by: FAMILY MEDICINE

## 2022-01-25 RX ORDER — METFORMIN HYDROCHLORIDE 1000 MG/1
1000 TABLET ORAL 2 TIMES DAILY WITH MEALS
Qty: 180 TABLET | Refills: 3 | Status: SHIPPED | OUTPATIENT
Start: 2022-01-25 | End: 2022-09-07 | Stop reason: SDUPTHER

## 2022-01-25 RX ORDER — NYSTATIN 100000 U/G
CREAM TOPICAL 2 TIMES DAILY
Qty: 30 G | Refills: 0 | Status: SHIPPED | OUTPATIENT
Start: 2022-01-25 | End: 2022-03-15

## 2022-01-25 NOTE — PROGRESS NOTES
Sabrina Hubbard is a 62 y.o.  male and presents with    Chief Complaint   Patient presents with    Diabetes    Sleep Apnea    Hypertension    Complete Physical       Subjective: Well Adult Physical   Patient here for a comprehensive physical exam.The patient reports problems - type 2 diabetes mellitus, hypertension, hypercholesterolemia, CAD, morbid obesity  Do you take any herbs or supplements that were not prescribed by a doctor? no Are you taking calcium supplements? no Are you taking aspirin daily? yes    He c/o skin rash behind ears with foul smell and flaking. He has been washing with soap and water. He has ear drops for otitis externa and has applied this medication and it caused burning but it decreases flaking. Cardiovascular Review:  The patient has diabetes, hypertension, hyperlipidemia, coronary artery disease and obesity. Diet and Lifestyle: generally follows a low fat low cholesterol diet, generally follows a low sodium diet, follows a diabetic diet regularly, exercises regularly, nonsmoker  Home BP Monitoring: is well controlled at home, ranging 130's/85's. Pertinent ROS: taking medications as instructed, no medication side effects noted, no TIA's, no chest pain on exertion, no dyspnea on exertion, no swelling of ankles.   Diabetes Mellitus:  He has diabetes mellitus. Diabetic ROS - medication compliance: compliant all of the time, diabetic diet compliance: compliant most of the time, home glucose monitoring: is performed regularly. He reports that he has average 110  Lab review: labs reviewed, I note that glycosylated hemoglobin mildly abnormal but acceptable.      ROS   Constitutional: Negative for appetite change, chills, diaphoresis, fatigue and fever. HENT: Negative for congestion and sore throat.    Respiratory: Negative for cough.    Cardiovascular: Negative for chest pain and palpitations. Gastrointestinal: Negative for nausea and vomiting.    Genitourinary: Negative for dysuria. Musculoskeletal: Negative for back pain. Skin: Positive for lesion on back  Neurological: Negative for dizziness and headaches     All other systems reviewed and are negative. Objective:    Visit Vitals  BP (!) 159/88 (BP 1 Location: Right arm, BP Patient Position: Sitting, BP Cuff Size: Adult) Comment: did not take b/p med   Pulse 86   Temp 97.4 °F (36.3 °C) (Temporal)   Resp 16   Ht 6' 2\" (1.88 m)   Wt 336 lb (152.4 kg)   SpO2 96%   BMI 43.14 kg/m²       General appearance  alert, cooperative, no distress, appears stated age   Head  Normocephalic, without obvious abnormality, atraumatic   Eyes  conjunctivae/corneas clear. PERRL, EOM's intact. Ears  normal TM's and external ear canals AU   Nose Nares normal. Septum midline. Mucosa normal. No drainage or sinus tenderness. Throat Lips, mucosa, and tongue normal. Teeth and gums normal   Neck supple, symmetrical, trachea midline, no adenopathy, thyroid: not enlarged, symmetric, no tenderness/mass/nodules and no JVD   Back   symmetric, no curvature. ROM normal. No CVA tenderness   Lungs   clear to auscultation bilaterally   Chest wall  no tenderness   Heart  regular rate and rhythm, S1, S2 normal, no murmur, click, rub or gallop   Abdomen   soft, non-tender. Bowel sounds normal. No masses,  No organomegaly   Genitalia  deferred   Rectal  deferred   Extremities extremities normal, atraumatic, no cyanosis or edema   Pulses 2+ and symmetric   Skin Skin color, texture, turgor normal. No rashes or lesions   Lymph nodes Cervical, supraclavicular, and axillary nodes normal.   Neurologic Normal     LABS     TESTS      Assessment/Plan:    1. Type 2 diabetes mellitus with peripheral vascular disease (HCC)  Goal hgb a1c <7; assess control  - MICROALBUMIN, UR, RAND W/ MICROALB/CREAT RATIO; Future  - HEMOGLOBIN A1C WITH EAG; Future    2. Peripheral vascular disease (HCC)  Encourage exercise and compression stockings    3.  Colon cancer screening    - REFERRAL TO GASTROENTEROLOGY    4. Annual physical exam  Reviewed preventive recommendations    5. Intertrigo  Start topical antifungal  - nystatin (MYCOSTATIN) topical cream; Apply  to affected area two (2) times a day. Dispense: 30 g; Refill: 0    6. Morbid obesity ([address]  I have reviewed/discussed the above normal BMI with the patient. I have recommended the following interventions: dietary management education, guidance, and counseling and encourage exercise . .        7. Hypercholesterolemia  Assess control of condition  - LIPID PANEL; Future    8. Family history of colon cancer in mother      5. Skin candidiasis  Start topical antifungal  - nystatin (MYCOSTATIN) topical cream; Apply  to affected area two (2) times a day. Dispense: 30 g; Refill: 0    10. Type 2 diabetes mellitus with hyperglycemia, without long-term current use of insulin (HCC)    - metFORMIN (GLUCOPHAGE) 1,000 mg tablet; Take 1 Tablet by mouth two (2) times daily (with meals). Dispense: 180 Tablet; Refill: 3      Lab review: orders written for new lab studies as appropriate; see orders      I have discussed the diagnosis with the patient and the intended plan as seen in the above orders. The patient has received an after-visit summary and questions were answered concerning future plans. I have discussed medication side effects and warnings with the patient as well. I have reviewed the plan of care with the patient, accepted their input and they are in agreement with the treatment goals.

## 2022-01-25 NOTE — PROGRESS NOTES
Jordana Barnard. presents today for   Chief Complaint   Patient presents with    Diabetes    Sleep Apnea    Hypertension       Is someone accompanying this pt? no    Is the patient using any DME equipment during OV? no    Depression Screening:  3 most recent PHQ Screens 1/25/2022   Little interest or pleasure in doing things Not at all   Feeling down, depressed, irritable, or hopeless Not at all   Total Score PHQ 2 0       Learning Assessment:  Learning Assessment 3/19/2014   PRIMARY LEARNER Patient   HIGHEST LEVEL OF EDUCATION - PRIMARY LEARNER  [facility] PR[facility]ARN[facility] NONE   CO-LEARNER CAREGIVER No   PRIMARY LANGUAGE ENGLISH   LEARNER PREFERENCE PRIMARY READING     -     -   ANSWERED BY self   RELATIONSHIP SELF       Abuse Screening:  Abuse Screening Questionnaire 8/12/2020   Do you ever feel afraid of your partner? N   Are you in a relationship with someone who physically or mentally threatens you? N   Is it safe for you to go home? Y       Fall Risk  Fall Risk Assessment, last 12 mths 12/1/2020   Able to walk? Yes   Fall in past 12 months? No       Health Maintenance reviewed and discussed and ordered per Provider. Health Maintenance Due   Topic Date Due    COVID-19 Vaccine (1) Never done    Shingrix Vaccine Age 50> (1 of 2) Never done    Eye Exam Retinal or Dilated  03/06/2020    Flu Vaccine (1) 09/01/2021    MICROALBUMIN Q1  12/01/2021    Lipid Screen  12/01/2021   . Coordination of Care:  1. Have you been to the ER, urgent care clinic since your last visit? Hospitalized since your last visit? no    2. Have you seen or consulted any other health care providers outside of the [address] [place] [address] since your last visit? Include any pap smears or colon screening. no      Last  Checked na  Last UDS Checked na  Last Pain contract signed: na    Patient presents in office today for routine care.   Patient concerns: rash behind ears, lab work, and med refills

## 2022-01-26 ENCOUNTER — TELEPHONE (OUTPATIENT)
Dept: PHARMACY | Age: 62
End: 2022-01-26

## 2022-01-26 LAB
ALBUMIN SERPL-MCNC: 4.4 G/DL (ref 3.8–4.9)
ALBUMIN/CREAT UR: 7 MG/G CREAT (ref 0–29)
ALBUMIN/GLOB SERPL: 1.9 {RATIO} (ref 1.2–2.2)
ALP SERPL-CCNC: 85 IU/L (ref 44–121)
ALT SERPL-CCNC: 18 IU/L (ref 0–44)
AST SERPL-CCNC: 18 IU/L (ref 0–40)
BILIRUB SERPL-MCNC: 0.5 MG/DL (ref 0–1.2)
BUN SERPL-MCNC: 15 MG/DL (ref 8–27)
BUN/CREAT SERPL: 14 (ref 10–24)
CALCIUM SERPL-MCNC: 9 MG/DL (ref 8.6–10.2)
CHLORIDE SERPL-SCNC: 101 MMOL/L (ref 96–106)
CHOLEST SERPL-MCNC: 214 MG/DL (ref 100–199)
CO2 SERPL-SCNC: 27 MMOL/L (ref 20–29)
CREAT SERPL-MCNC: 1.04 MG/DL (ref 0.76–1.27)
CREAT UR-MCNC: 199 MG/DL
EST. AVERAGE GLUCOSE BLD GHB EST-MCNC: 166 MG/DL
GLOBULIN SER CALC-MCNC: 2.3 G/DL (ref 1.5–4.5)
GLUCOSE SERPL-MCNC: 132 MG/DL (ref 65–99)
HBA1C MFR BLD: 7.4 % (ref 4.8–5.6)
HDLC SERPL-MCNC: 39 MG/DL
IMP & REVIEW OF LAB RESULTS: NORMAL
LDLC SERPL CALC-MCNC: 145 MG/DL (ref 0–99)
MICROALBUMIN UR-MCNC: 14.9 UG/ML
POTASSIUM SERPL-SCNC: 4.1 MMOL/L (ref 3.5–5.2)
PROT SERPL-MCNC: 6.7 G/DL (ref 6–8.5)
SODIUM SERPL-SCNC: 141 MMOL/L (ref 134–144)
TRIGL SERPL-MCNC: 168 MG/DL (ref 0–149)
VLDLC SERPL CALC-MCNC: 30 MG/DL (ref 5–40)

## 2022-01-26 NOTE — TELEPHONE ENCOUNTER
Patient called back and requested appt on 1/31 at 330pm    GEOVANNA Tan, PharmD, [facility]  Phone: [phone]

## 2022-01-26 NOTE — TELEPHONE ENCOUNTER
Left message on voicemail for patient to call us back at [phone] option 3 to schedule your 2022 Be Well With Diabetes annual pharmacist telephone appointment.

## 2022-01-26 NOTE — TELEPHONE ENCOUNTER
For [place] in place: No   Recommendation Provided To: Patient/Caregiver: 1 via Telephone   Intervention Detail: Scheduled Appointment   Gap Closed?: Yes   Intervention Accepted By: Patient/Caregiver: 1   Time Spent (min): 5

## 2022-01-29 ENCOUNTER — PATIENT MESSAGE (OUTPATIENT)
Dept: FAMILY MEDICINE CLINIC | Age: 62
End: 2022-01-29

## 2022-01-31 ENCOUNTER — TELEPHONE (OUTPATIENT)
Dept: PHARMACY | Age: 62
End: 2022-01-31

## 2022-01-31 NOTE — TELEPHONE ENCOUNTER
[facility] CLINICAL PHARMACY REVIEW - Be Well with Diabetes    Frank Lloyd is a 62 y.o. male enrolled in the [address] [place] [address],[address] Employee Diabetes Program. Patient provided Hallie Solano with verbal consent to remain in the program for this year. Medications:   Current Outpatient Medications   Medication Sig    bisacodyL 5 mg tab Take 1 Tablet by mouth daily as needed for PRN Reason (Other) (constipation).  Ozempic 1 mg/dose (4 mg/3 mL) pnij INJECT 1MG UNDER THE SKIN EVERY 7 DAYS    terbinafine HCL (LAMISIL) 250 mg tablet TAKE 1 TABLET BY MOUTH ONE TIME A DAY    metFORMIN (GLUCOPHAGE) 1,000 mg tablet Take 1 Tablet by mouth two (2) times daily (with meals).  hydroCHLOROthiazide (HYDRODIURIL) 25 mg tablet TAKE 1 TABLET BY MOUTH ONE TIME A DAY    aspirin 81 mg chewable tablet Take 1 Tablet by mouth daily.  glucose blood VI test strips Carmen Rahe[employer] Jazz) strip Use to test blood sugar 2 times daily    lancets misc Use to test blood sugar 2 times daily    naproxen (NAPROSYN) 375 mg tablet TAKE 1 TABLET TWICE DAILY WITH MEALS    amLODIPine (NORVASC) 5 mg tablet TAKE 1 TABLET BY MOUTH DAILY.  Blood-Glucose Meter ([employer] 2 Meter kit) monitoring kit Use as directed to monitor blood sugars    Blood Glucose Control, High soln Use to complete control solution test    mupirocin (BACTROBAN) 2 % ointment Apply  to affected area daily.  mometasone (ELOCON) 0.1 % lotion Apply to ear once a day as needed     No current facility-administered medications for this visit.      Current Pharmacy: [facility]  Current testing supplies/frequency: Ag[employer] Jazz 2-3x/day    Allergies:  No Known Allergies     Vitals/Labs:  BP Readings from Last 3 Encounters:   01/25/22 (!) 159/88   06/17/21 127/88   12/03/20 (!) 144/98     Lab Results   Component Value Date/Time    Microalb/Creat ratio (ug/mg creat.) 7 01/25/2022 11:38 AM     Lab Results   Component Value Date/Time Hemoglobin A1c 7.4 (H) 2022 11:38 AM    Hemoglobin A1c 7.7 (H) 2021 11:04 AM    Hemoglobin A1c 8.7 (H) 2020 12:31 PM     Lab Results   Component Value Date/Time    Cholesterol, total 214 (H) 2022 11:38 AM    HDL Cholesterol 39 (L) 2022 11:38 AM    LDL, calculated 145 (H) 2022 11:38 AM    Triglyceride 168 (H) 2022 11:38 AM     ALT (SGPT)   Date Value Ref Range Status   2022 18 0 - 44 IU/L Final     AST (SGOT)   Date Value Ref Range Status   2022 18 0 - 40 IU/L Final     The 10-year ASCVD risk score (Maame Brandon, et al., 2013) is: 32.1%    Values used to calculate the score:      Age: 62 years      Sex: Male      Is Non- : No      Diabetic: Yes      Tobacco smoker: No      Systolic Blood Pressure: 489 mmHg      Is BP treated: Yes      HDL Cholesterol: 39 mg/dL      Total Cholesterol: 214 mg/dL     Lab Results   Component Value Date/Time    Creatinine 1.04 2022 11:38 AM     Estimated Creatinine Clearance: 117.8 mL/min (by C-G formula based on SCr of 1.04 mg/dL).     Lab Results   Component Value Date/Time    GFR est non-AA 78 2022 11:38 AM     Immunizations:  Immunization History   Administered Date(s) Administered    (RETIRED) Pneumococcal Vaccine (Unspecified Type) 2011    COVID-19, Pfizer Purple top, DILUTE for use, 12+ yrs, 30mcg/0.3mL dose 2021, 2021, 12/10/2021    Influenza Vaccine 2013, 10/15/2019, 10/19/2020, 2021    Influenza Vaccine Split 2011    Pneumococcal Polysaccharide (PPSV-23) 2011, 2022    TD Vaccine 2008    Tdap 2017    Zoster Recombinant 2022      Social History:  Social History     Tobacco Use    Smoking status: Former Smoker     Quit date: 10/15/1993     Years since quittin.3    Smokeless tobacco: Never Used    Tobacco comment:    Substance Use Topics    Alcohol use: No     ASSESSMENT:  Initial Program Requirements (Y indicates has completed for the year, N indicates needs to be completed by 07/01/2022): Yes - Provider Visit for DM (1st)  Yes - A1c (1st)     Ongoing Program Requirements (Y indicates has completed for the year, N indicates needs to be completed by 12/31/2022): No - Provider Visit for DM (2nd)  No - ACC/diabetes educator visit (if A1c over 8%) - not currently needed  No - A1c (2nd)  Yes - Lipid panel  Yes - Urine microalbumin  Yes - Pneumococcal vaccination: UTD  No - Influenza vaccination for Fall 2022  No - Medication adherence over 70%  No - On statin or contraindication(s)   No - On ACEi/ARB or contraindication(s)     Current medications eligible for copay waiver, up to $600, through [employer]:  - Ozempic, metformin, hydrochlorothiazide, amlodipine, aspirin  - Agamatrix Jazz    Diabetes Care:   - Glycemic Goal: <7.0%. Is not at blood glucose goal   - Current symptoms/problems include none at this time, does have eye issues when BG very high  - Any episodes of hypoglycemia? no  - Therapy Optimization: pt has issue with constipation so discussed while metformin typically causes diarrhea, could consider different formulation to see if would help - pt reports metformin working well so does not want to mess with  - Medication compliance: compliant most of the time  - Diet compliance: pt has been avoiding cheese, eats veggies/ruffage, has yogurt w/ probiotic for breakfast, has probiotic drinks, eats fruits like plums and prunes  - Blood Pressure: has been elevated  - Lipids: Patient is not prescribed high-intensity statin therapy.   - Other: has family history of constipation, no longer taking sodium bicarbonate, was wondering if terbinafine is causing constipation; send [employer] message if any meds could be causing GI issues    PLAN:  - Consideration(s) for provider:   · Consider statin  · Consider ACEi/ARB  · New glucometer  - DM program gaps identified:   · Initial requirements: Requirements met  · Ongoing requirements: Provider visit for DM (2nd), A1c (2nd), Influenza vaccination for [id number] and Medication adherence over 70%  - Education to patient: Discussed general issues about diabetes pathophysiology and management., Addressed diet and exercise, Addressed medication adherence, Overview of Be Well With Diabetes program and Overview of HHP  - Follow up: PCP for identified gaps or as scheduled below  - Upcoming appointments:   Date Time Provider Fátima Rosemary   7/25/2022 10:00 AM Bruce Arnold MD DMA BS AMB     Gilbert Varela, PharmD, BCACP, [facility] [address]  Department, toll free: [phone], option 3    =======================================================    For Pharmacy Admin Tracking Only   Recommendation Provided To: Provider: 2 via Note to Provider    Intervention Detail: New Rx: 2, reason: Needs Additional Therapy   Gap Closed?: Yes   Intervention Accepted By: Provider: 1   Time Spent (min): 30

## 2022-02-14 RX ORDER — SEMAGLUTIDE 1.34 MG/ML
INJECTION, SOLUTION SUBCUTANEOUS
Qty: 12 EACH | Refills: 3 | Status: SHIPPED | OUTPATIENT
Start: 2022-02-14 | End: 2023-02-14

## 2022-03-15 ENCOUNTER — PATIENT MESSAGE (OUTPATIENT)
Dept: PHARMACY | Age: 62
End: 2022-03-15

## 2022-03-15 ENCOUNTER — TELEPHONE (OUTPATIENT)
Dept: PHARMACY | Age: 62
End: 2022-03-15

## 2022-03-15 DIAGNOSIS — I25.10 CORONARY ARTERY DISEASE INVOLVING NATIVE CORONARY ARTERY OF NATIVE HEART WITHOUT ANGINA PECTORIS: Primary | ICD-10-CM

## 2022-03-15 DIAGNOSIS — E78.00 HYPERCHOLESTEROLEMIA: ICD-10-CM

## 2022-03-15 NOTE — TELEPHONE ENCOUNTER
Dr. Zen Bettencourt MD,    Your patient is currently enrolled in the Be Well with Diabetes program. After your patient's recent visit with a [address] [place] [address]  Clinical Pharmacist, the below were identified as opportunities to assist with their diabetes management (if patient is not eligible for below recommendations, please reply with reason/contraindication):   · Consider initiating high-intensity statin therapy, such as rosuvastatin 20mg once daily, given patient's age, diabetes diagnosis, ASCVD risk score >20%, and elevated cholesterol. · If patient's blood pressure remains elevated, consider addition of ACEi or ARB. See pharmacist note from encounter dated 01/31/2022 for complete details.     Thank you,  Sheila Dozier, PharmD, BCACP, [facility] [facility]  Department, toll free: [phone], option 3

## 2022-03-16 RX ORDER — ROSUVASTATIN CALCIUM 10 MG/1
10 TABLET, COATED ORAL
Qty: 90 TABLET | Refills: 3 | Status: SHIPPED | OUTPATIENT
Start: 2022-03-16 | End: 2022-07-08

## 2022-03-16 NOTE — TELEPHONE ENCOUNTER
POPULATION HEALTH CLINICAL PHARMACY REVIEW - BE WELL WITH DIABETES    Routed message from provider: Thank you for the update.  We will make some changes. Additionally, provider ordered rosuvastatin for patient and sent MyChart message to patient.      Vickie Machado, PharmD, BCACP, [facility]  Department, toll free: [phone], option 3

## 2022-03-17 DIAGNOSIS — H60.543 ECZEMA OF BOTH EXTERNAL EARS: ICD-10-CM

## 2022-03-18 PROBLEM — E11.9 CONTROLLED TYPE 2 DIABETES MELLITUS WITHOUT COMPLICATION, WITHOUT LONG-TERM CURRENT USE OF INSULIN (HCC): Status: ACTIVE | Noted: 2018-11-07

## 2022-03-18 RX ORDER — MOMETASONE FUROATE 1 MG/ML
SOLUTION TOPICAL
Qty: 30 ML | Refills: 12 | Status: SHIPPED | OUTPATIENT
Start: 2022-03-18 | End: 2022-07-08

## 2022-03-19 PROBLEM — J96.02 ACUTE RESPIRATORY FAILURE WITH HYPOXIA AND HYPERCAPNIA (HCC): Status: ACTIVE | Noted: 2020-04-28

## 2022-03-19 PROBLEM — E66.01 OBESITY, MORBID (HCC): Status: ACTIVE | Noted: 2017-12-14

## 2022-03-19 PROBLEM — E11.51 TYPE 2 DIABETES MELLITUS WITH PERIPHERAL VASCULAR DISEASE (HCC): Status: ACTIVE | Noted: 2020-04-23

## 2022-03-19 PROBLEM — J96.01 ACUTE RESPIRATORY FAILURE WITH HYPOXIA AND HYPERCAPNIA (HCC): Status: ACTIVE | Noted: 2020-04-28

## 2022-03-19 PROBLEM — G93.41 METABOLIC ENCEPHALOPATHY: Status: ACTIVE | Noted: 2020-04-28

## 2022-03-19 PROBLEM — H60.543 ECZEMA OF BOTH EXTERNAL EARS: Status: ACTIVE | Noted: 2017-07-27

## 2022-03-19 PROBLEM — N17.9 STAGE 1 ACUTE KIDNEY INJURY (HCC): Status: ACTIVE | Noted: 2020-04-28

## 2022-03-19 PROBLEM — A41.9 SEPSIS (HCC): Status: ACTIVE | Noted: 2020-04-28

## 2022-03-20 PROBLEM — I73.9 PERIPHERAL VASCULAR DISEASE (HCC): Status: ACTIVE | Noted: 2020-04-23

## 2022-03-20 PROBLEM — J18.9 COMMUNITY ACQUIRED PNEUMONIA: Status: ACTIVE | Noted: 2020-04-28

## 2022-03-20 PROBLEM — J18.9 MULTIFOCAL PNEUMONIA: Status: ACTIVE | Noted: 2020-04-28

## 2022-04-20 LAB
EST. AVERAGE GLUCOSE BLD GHB EST-MCNC: 166 MG/DL
HBA1C MFR BLD: 7.4 % (ref 4.2–5.6)

## 2022-04-27 LAB
CHOLEST SERPL-MCNC: 194 MG/DL
GLUCOSE SERPL-MCNC: 130 MG/DL (ref 74–99)
HDLC SERPL-MCNC: 38 MG/DL (ref 40–60)
LDLC SERPL CALC-MCNC: 117.2 MG/DL (ref 0–100)
TRIGL SERPL-MCNC: 194 MG/DL (ref ?–150)

## 2022-05-04 DIAGNOSIS — I10 ESSENTIAL HYPERTENSION, MALIGNANT: ICD-10-CM

## 2022-05-04 DIAGNOSIS — E11.51 TYPE 2 DIABETES MELLITUS WITH PERIPHERAL VASCULAR DISEASE (HCC): Primary | ICD-10-CM

## 2022-05-04 RX ORDER — LOSARTAN POTASSIUM 25 MG/1
25 TABLET ORAL DAILY
Qty: 90 TABLET | Refills: 3 | Status: SHIPPED | OUTPATIENT
Start: 2022-05-04

## 2022-05-04 NOTE — PROGRESS NOTES
Blood pressure not at goal; pt has diabetes and should be on an ACE or an ARB. I will start losartan 25 mg to improve his blood pressure. He also needs to exercise and follow a DASH diet.

## 2022-07-26 ENCOUNTER — OFFICE VISIT (OUTPATIENT)
Dept: FAMILY MEDICINE CLINIC | Age: 62
End: 2022-07-26
Payer: COMMERCIAL

## 2022-07-26 VITALS
HEART RATE: 88 BPM | DIASTOLIC BLOOD PRESSURE: 87 MMHG | OXYGEN SATURATION: 96 % | HEIGHT: 74 IN | WEIGHT: 315 LBS | TEMPERATURE: 97.6 F | SYSTOLIC BLOOD PRESSURE: 125 MMHG | BODY MASS INDEX: 40.43 KG/M2 | RESPIRATION RATE: 16 BRPM

## 2022-07-26 DIAGNOSIS — I10 ESSENTIAL HYPERTENSION, MALIGNANT: ICD-10-CM

## 2022-07-26 DIAGNOSIS — D12.4 ADENOMATOUS POLYP OF DESCENDING COLON: Primary | ICD-10-CM

## 2022-07-26 DIAGNOSIS — E11.51 TYPE 2 DIABETES MELLITUS WITH PERIPHERAL VASCULAR DISEASE (HCC): ICD-10-CM

## 2022-07-26 DIAGNOSIS — R05.9 COUGH: ICD-10-CM

## 2022-07-26 PROCEDURE — 3051F HG A1C>EQUAL 7.0%<8.0%: CPT | Performed by: FAMILY MEDICINE

## 2022-07-26 PROCEDURE — 99214 OFFICE O/P EST MOD 30 MIN: CPT | Performed by: FAMILY MEDICINE

## 2022-07-26 RX ORDER — MONTELUKAST SODIUM 10 MG/1
10 TABLET ORAL DAILY
Qty: 30 TABLET | Refills: 5 | Status: SHIPPED | OUTPATIENT
Start: 2022-07-26

## 2022-07-26 RX ORDER — AMLODIPINE BESYLATE 5 MG/1
5 TABLET ORAL DAILY
Qty: 90 TABLET | Refills: 4 | Status: SHIPPED | OUTPATIENT
Start: 2022-07-26 | End: 2023-07-26

## 2022-07-26 NOTE — PROGRESS NOTES
Jossue Ivy. presents today for   Chief Complaint   Patient presents with    Diabetes    Hypertension       Is someone accompanying this pt? no    Is the patient using any DME equipment during OV? no    Depression Screening:  3 most recent PHQ Screens 7/26/2022   Little interest or pleasure in doing things Not at all   Feeling down, depressed, irritable, or hopeless Not at all   Total Score PHQ 2 0       Learning Assessment:  Learning Assessment 3/19/2014   PRIMARY LEARNER Patient   HIGHEST LEVEL OF EDUCATION - PRIMARY LEARNER  [facility] PRIMARY LEARNER NONE   CO-LEARNER CAREGIVER No   PRIMARY LANGUAGE ENGLISH   LEARNER PREFERENCE PRIMARY READING     -     -   ANSWERED BY self   RELATIONSHIP SELF       Abuse Screening:  Abuse Screening Questionnaire 8/12/2020   Do you ever feel afraid of your partner? N   Are you in a relationship with someone who physically or mentally threatens you? N   Is it safe for you to go home? Y       Fall Risk  Fall Risk Assessment, last 12 mths 12/1/2020   Able to walk? Yes   Fall in past 12 months? No       Health Maintenance reviewed and discussed and ordered per Provider. Health Maintenance Due   Topic Date Due    Eye Exam Retinal or Dilated  03/06/2020    Shingrix Vaccine Age 50> (2 of 2) 03/22/2022    COVID-19 Vaccine (4 - Booster for [employer] series) 04/10/2022    Foot Exam Q1  06/17/2022   . Coordination of Care:  1. Have you been to the ER, urgent care clinic since your last visit? Hospitalized since your last visit? no    2. Have you seen or consulted any other health care providers outside of the [address] [place] [address] since your last visit? Include any pap smears or colon screening. no      Last  Checked na  Last UDS Checked na  Last Pain contract signed: na    Patient presents in office today for routine care.   Patient concerns: check up

## 2022-07-26 NOTE — PROGRESS NOTES
Reynold Hawkins. is a 65 y.o.  male and presents with    Chief Complaint   Patient presents with    Diabetes    Hypertension       Subjective:    Cardiovascular Review:  The patient has diabetes, hypertension, hyperlipidemia, coronary artery disease and obesity. Diet and Lifestyle: generally follows a low fat low cholesterol diet, generally follows a low sodium diet, follows a diabetic diet regularly, exercises regularly, nonsmoker  Home BP Monitoring: is well controlled at home, ranging 130's/85's. Pertinent ROS: taking medications as instructed, no medication side effects noted, no TIA's, no chest pain on exertion, no dyspnea on exertion, no swelling of ankles. Diabetes Mellitus:  He has diabetes mellitus. Diabetic ROS - medication compliance: compliant all of the time, diabetic diet compliance: compliant most of the time, home glucose monitoring: is performed regularly. He reports that he has average 110  Lab review: labs reviewed, I note that glycosylated hemoglobin mildly abnormal but acceptable. ROS   Constitutional: Negative for appetite change, chills, diaphoresis, fatigue and fever. HENT: Negative for congestion and sore throat. Respiratory: Positive for cough with yellow phlegm for 3 weeks  Cardiovascular: Negative for chest pain and palpitations. Gastrointestinal: Negative for nausea and vomiting. Genitourinary: Negative for dysuria. Musculoskeletal: Negative for back pain. Skin: Positive for lesion on back  Neurological: Negative for dizziness and headaches     All other systems reviewed and are negative.     Objective:  Vitals:    07/26/22 1032   BP: 125/87   Pulse: 88   Resp: 16   Temp: 97.6 °F (36.4 °C)   TempSrc: Temporal   SpO2: 96%   Weight: 335 lb (152 kg)   Height: 6' 2\" (1.88 m)   PainSc:   0 - No pain       alert, well appearing, and in no distress, oriented to person, place, and time, and obese  Mental status - normal mood, behavior, speech, dress, motor activity, and thought processes  Chest - clear to auscultation, no wheezes, rales or rhonchi, symmetric air entry  Heart - normal rate, regular rhythm, normal S1, S2, no murmurs, rubs, clicks or gallops  Neurological - cranial nerves II through XII intact    LABS   Hgb a1c 7.4    TESTS      Assessment/Plan:    1. Essential hypertension, malignant  Goal <130/80; improved  - amLODIPine (NORVASC) 5 mg tablet; Take 1 Tablet by mouth in the morning. Dispense: 90 Tablet; Refill: 4    2. Adenomatous polyp of descending colon  Schedule colonoscopy in 5 years    3. Type 2 diabetes mellitus with peripheral vascular disease (HCC)  Goal hgb a1c <7; improved    4. Cough  Start treatment  - montelukast (SINGULAIR) 10 mg tablet; Take 1 Tablet by mouth in the morning. Dispense: 30 Tablet; Refill: 5      Lab review: labs reviewed, I note that glycosylated hemoglobin mildly abnormal but acceptable, lipids LDL result does not yet meet goal but improved      I have discussed the diagnosis with the patient and the intended plan as seen in the above orders. The patient has received an after-visit summary and questions were answered concerning future plans. I have discussed medication side effects and warnings with the patient as well. I have reviewed the plan of care with the patient, accepted their input and they are in agreement with the treatment goals. Scarlett Argueta

## 2022-08-10 DIAGNOSIS — U07.1 COVID-19: Primary | ICD-10-CM

## 2022-08-10 RX ORDER — NIRMATRELVIR AND RITONAVIR 300-100 MG
KIT ORAL
Qty: 1 BOX | Refills: 0 | Status: SHIPPED | OUTPATIENT
Start: 2022-08-10

## 2022-08-10 RX ORDER — NIRMATRELVIR AND RITONAVIR 300-100 MG
3 KIT ORAL EVERY 12 HOURS
Qty: 14 BOX | Refills: 0 | OUTPATIENT
Start: 2022-08-10

## 2022-08-10 NOTE — PROGRESS NOTES
Monday 8/8/2022 he developed symptoms such as runny nose, headache, coughing, wheezing and body aches. Patient says he tested positive yesterday 8/9/2022 for COVID. He would like to try the paxlovid COVID antiviral.  Patient says he believes he got COVID from visiting his father in [place] at a nursing home. His wife had COVID last last week and she did well on the paxlovid. Patient sounded to be in no distress by phone.

## 2022-09-06 DIAGNOSIS — E11.65 TYPE 2 DIABETES MELLITUS WITH HYPERGLYCEMIA, WITHOUT LONG-TERM CURRENT USE OF INSULIN (HCC): ICD-10-CM

## 2022-09-07 RX ORDER — NAPROXEN 375 MG/1
TABLET ORAL
Qty: 180 TABLET | Refills: 3 | Status: SHIPPED | OUTPATIENT
Start: 2022-09-07

## 2022-09-07 RX ORDER — METFORMIN HYDROCHLORIDE 1000 MG/1
TABLET ORAL
Qty: 180 TABLET | Refills: 3 | Status: SHIPPED | OUTPATIENT
Start: 2022-09-07

## 2022-09-08 RX ORDER — INSULIN PUMP SYRINGE, 3 ML
EACH MISCELLANEOUS
Qty: 1 KIT | Refills: 0 | Status: SHIPPED | OUTPATIENT
Start: 2022-09-08

## 2022-09-12 DIAGNOSIS — E11.65 TYPE 2 DIABETES MELLITUS WITH HYPERGLYCEMIA, WITHOUT LONG-TERM CURRENT USE OF INSULIN (HCC): ICD-10-CM

## 2022-09-15 ENCOUNTER — TELEPHONE (OUTPATIENT)
Dept: FAMILY MEDICINE CLINIC | Age: 62
End: 2022-09-15

## 2022-09-15 RX ORDER — BLOOD SUGAR DIAGNOSTIC
STRIP MISCELLANEOUS
Qty: 100 STRIP | Refills: 3 | Status: SHIPPED | OUTPATIENT
Start: 2022-09-15

## 2022-09-15 RX ORDER — LANCETS 33 GAUGE
EACH MISCELLANEOUS
Qty: 200 EACH | Refills: 3 | Status: SHIPPED | OUTPATIENT
Start: 2022-09-15

## 2022-09-15 NOTE — TELEPHONE ENCOUNTER
Called [facility]  and spoke with Lesly Colin to let her know that the quantity suppose to be 200.

## 2022-09-15 NOTE — TELEPHONE ENCOUNTER
Nevin Garza with [facility] is calling regarding the Rx for Lancet. She states the Rx quantity came over as 200 and she wants to know if the Test Strips be a quantity of 200, as well, so that they match? Please call to advise ASAP. Thank you.

## 2023-01-05 DIAGNOSIS — I10 ESSENTIAL HYPERTENSION, MALIGNANT: ICD-10-CM

## 2023-01-05 DIAGNOSIS — B35.1 ONYCHOMYCOSIS: ICD-10-CM

## 2023-01-05 DIAGNOSIS — R05.9 COUGH: ICD-10-CM

## 2023-01-06 DIAGNOSIS — I10 ESSENTIAL HYPERTENSION, MALIGNANT: ICD-10-CM

## 2023-01-06 RX ORDER — HYDROCHLOROTHIAZIDE 25 MG/1
TABLET ORAL
Qty: 90 TABLET | Refills: 3 | Status: SHIPPED | OUTPATIENT
Start: 2023-01-06

## 2023-01-06 RX ORDER — MONTELUKAST SODIUM 10 MG/1
10 TABLET ORAL DAILY
Qty: 30 TABLET | Refills: 5 | Status: SHIPPED | OUTPATIENT
Start: 2023-01-06

## 2023-01-06 RX ORDER — GUAIFENESIN 100 MG/5ML
LIQUID (ML) ORAL
Qty: 90 TABLET | Refills: 3 | Status: SHIPPED | OUTPATIENT
Start: 2023-01-06

## 2023-01-06 RX ORDER — TERBINAFINE HYDROCHLORIDE 250 MG/1
TABLET ORAL
Qty: 30 TABLET | Refills: 2 | Status: SHIPPED | OUTPATIENT
Start: 2023-01-06

## 2023-01-13 NOTE — PROGRESS NOTES
Problem: Diabetes Self-Management  Goal: *Disease process and treatment process  Description: Define diabetes and identify own type of diabetes; list 3 options for treating diabetes. Outcome: Progressing Towards Goal  Goal: *Incorporating nutritional management into lifestyle  Description: Describe effect of type, amount and timing of food on blood glucose; list 3 methods for planning meals. Outcome: Progressing Towards Goal  Goal: *Incorporating physical activity into lifestyle  Description: State effect of exercise on blood glucose levels. Outcome: Progressing Towards Goal  Goal: *Developing strategies to promote health/change behavior  Description: Define the ABC's of diabetes; identify appropriate screenings, schedule and personal plan for screenings. Outcome: Progressing Towards Goal  Goal: *Using medications safely  Description: State effect of diabetes medications on diabetes; name diabetes medication taking, action and side effects. Outcome: Progressing Towards Goal  Goal: *Monitoring blood glucose, interpreting and using results  Description: Identify recommended blood glucose targets  and personal targets. Outcome: Progressing Towards Goal  Goal: *Prevention, detection, treatment of acute complications  Description: List symptoms of hyper- and hypoglycemia; describe how to treat low blood sugar and actions for lowering  high blood glucose level. Outcome: Progressing Towards Goal  Goal: *Prevention, detection and treatment of chronic complications  Description: Define the natural course of diabetes and describe the relationship of blood glucose levels to long term complications of diabetes.   Outcome: Progressing Towards Goal  Goal: *Developing strategies to address psychosocial issues  Description: Describe feelings about living with diabetes; identify support needed and support network  Outcome: Progressing Towards Goal  Goal: *Insulin pump training  Outcome: Progressing Towards Goal  Goal: *Sick day guidelines  Outcome: Progressing Towards Goal  Goal: *Patient Specific Goal (EDIT GOAL, INSERT TEXT)  Outcome: Progressing Towards Goal     Problem: Patient Education: Go to Patient Education Activity  Goal: Patient/Family Education  Outcome: Progressing Towards Goal     Problem: Pressure Injury - Risk of  Goal: *Prevention of pressure injury  Description: Document Dominick Scale and appropriate interventions in the flowsheet. Outcome: Progressing Towards Goal  Note: Pressure Injury Interventions:  Sensory Interventions: Pressure redistribution bed/mattress (bed type)    Moisture Interventions: Apply protective barrier, creams and emollients    Activity Interventions: Assess need for specialty bed    Mobility Interventions: Pressure redistribution bed/mattress (bed type)    Nutrition Interventions: Document food/fluid/supplement intake    Friction and Shear Interventions: Apply protective barrier, creams and emollients                Problem: Patient Education: Go to Patient Education Activity  Goal: Patient/Family Education  Outcome: Progressing Towards Goal     Problem: Discharge Planning  Goal: *Discharge to safe environment  Outcome: Progressing Towards Goal     Problem: Falls - Risk of  Goal: *Absence of Falls  Description: Document Tom Fall Risk and appropriate interventions in the flowsheet. Outcome: Progressing Towards Goal  Note: Fall Risk Interventions:  Mobility Interventions: Bed/chair exit alarm         Medication Interventions: Bed/chair exit alarm    Elimination Interventions: Call light in reach, Urinal in reach    History of Falls Interventions:  Investigate reason for fall, Door open when patient unattended         Problem: Patient Education: Go to Patient Education Activity  Goal: Patient/Family Education  Outcome: Progressing Towards Goal     Problem: Pain  Goal: *Control of Pain  Outcome: Progressing Towards Goal     Problem: Patient Education: Go to Patient Education Activity  Goal: Patient/Family Education  Outcome: Progressing Towards Goal     Problem: Gas Exchange - Impaired  Goal: *Absence of hypoxia  Outcome: Progressing Towards Goal     Problem: Diabetes Maintenance:Admission  Goal: *Blood glucose 80 to 180 mg/dl  Outcome: Progressing Towards Goal Simple / Intermediate / Complex Repair - Final Wound Length In Cm: 0

## 2023-01-19 ENCOUNTER — TELEPHONE (OUTPATIENT)
Dept: PHARMACY | Age: 63
End: 2023-01-19

## 2023-01-19 NOTE — TELEPHONE ENCOUNTER
2023 Annual Pharmacist Visit **Patient is [facility]**     Called patient to schedule 2023 yearly pharmacist appointment to discuss medications for Diabetes Management Program.     Spoke to patient and appointment scheduled for 1/24/23 at 1:30PM       Nargis Manzano CPhT.    [address] [facility] free: [phone]      For Pharmacy Admin Tracking Only    Program: [address] in place: No  Recommendation Provided To: Patient/Caregiver: 1 via Telephone  Intervention Detail: Scheduled Appointment  Intervention Accepted By: Patient/Caregiver: 1  Gap Closed?: Yes  Time Spent (min): 10

## 2023-01-24 ENCOUNTER — TELEPHONE (OUTPATIENT)
Dept: PHARMACY | Age: 63
End: 2023-01-24

## 2023-01-24 ENCOUNTER — OFFICE VISIT (OUTPATIENT)
Dept: FAMILY MEDICINE CLINIC | Age: 63
End: 2023-01-24
Payer: COMMERCIAL

## 2023-01-24 VITALS
HEART RATE: 95 BPM | RESPIRATION RATE: 17 BRPM | BODY MASS INDEX: 40.43 KG/M2 | DIASTOLIC BLOOD PRESSURE: 83 MMHG | WEIGHT: 315 LBS | SYSTOLIC BLOOD PRESSURE: 139 MMHG | OXYGEN SATURATION: 93 % | HEIGHT: 74 IN | TEMPERATURE: 97.3 F

## 2023-01-24 DIAGNOSIS — G56.01 CARPAL TUNNEL SYNDROME OF RIGHT WRIST: ICD-10-CM

## 2023-01-24 DIAGNOSIS — Z00.00 ANNUAL PHYSICAL EXAM: ICD-10-CM

## 2023-01-24 DIAGNOSIS — R06.09 DYSPNEA ON EXERTION: Primary | ICD-10-CM

## 2023-01-24 DIAGNOSIS — Z23 NEED FOR VACCINATION AGAINST STREPTOCOCCUS PNEUMONIAE: ICD-10-CM

## 2023-01-24 DIAGNOSIS — E11.51 TYPE 2 DIABETES MELLITUS WITH PERIPHERAL VASCULAR DISEASE (HCC): ICD-10-CM

## 2023-01-24 DIAGNOSIS — E66.01 OBESITY, CLASS III, BMI 40-49.9 (MORBID OBESITY) (HCC): ICD-10-CM

## 2023-01-24 DIAGNOSIS — Z23 NEED FOR SHINGLES VACCINE: ICD-10-CM

## 2023-01-24 PROBLEM — J18.9 MULTIFOCAL PNEUMONIA: Status: RESOLVED | Noted: 2020-04-28 | Resolved: 2023-01-24

## 2023-01-24 PROBLEM — Z87.01 HISTORY OF PNEUMONIA: Status: ACTIVE | Noted: 2020-04-28

## 2023-01-24 PROBLEM — J96.02 ACUTE RESPIRATORY FAILURE WITH HYPOXIA AND HYPERCAPNIA (HCC): Status: RESOLVED | Noted: 2020-04-28 | Resolved: 2023-01-24

## 2023-01-24 PROBLEM — J96.01 ACUTE RESPIRATORY FAILURE WITH HYPOXIA AND HYPERCAPNIA (HCC): Status: RESOLVED | Noted: 2020-04-28 | Resolved: 2023-01-24

## 2023-01-24 PROCEDURE — 3075F SYST BP GE 130 - 139MM HG: CPT | Performed by: FAMILY MEDICINE

## 2023-01-24 PROCEDURE — 3079F DIAST BP 80-89 MM HG: CPT | Performed by: FAMILY MEDICINE

## 2023-01-24 PROCEDURE — 99396 PREV VISIT EST AGE 40-64: CPT | Performed by: FAMILY MEDICINE

## 2023-01-24 PROCEDURE — 90471 IMMUNIZATION ADMIN: CPT | Performed by: FAMILY MEDICINE

## 2023-01-24 PROCEDURE — 90472 IMMUNIZATION ADMIN EACH ADD: CPT | Performed by: FAMILY MEDICINE

## 2023-01-24 PROCEDURE — 90750 HZV VACC RECOMBINANT IM: CPT | Performed by: FAMILY MEDICINE

## 2023-01-24 PROCEDURE — 93000 ELECTROCARDIOGRAM COMPLETE: CPT | Performed by: FAMILY MEDICINE

## 2023-01-24 PROCEDURE — 90677 PCV20 VACCINE IM: CPT | Performed by: FAMILY MEDICINE

## 2023-01-24 RX ORDER — MULTIVITAMIN
1 TABLET ORAL DAILY
COMMUNITY

## 2023-01-24 RX ORDER — ASCORBIC ACID 500 MG
500 TABLET ORAL 2 TIMES DAILY
COMMUNITY

## 2023-01-24 NOTE — LETTER
[address] [facility] [address]  [address], [place]        Natividad Erazo. [address] [facility]           01/24/23     Dear Natividad Erazo.,    The [facility] Team has attempted to contact you for your scheduled Diabetes Management telephone appointment but was unable to reach you. One of the requirements to participate in the [facility] Be Well With Diabetes Program is to complete this appointment. We would like to work with you and your doctor to:  - Review your medications, including over-the-counter and herbal medications  - Answer questions about your medications and how to get the most benefit from them  - Identify potential drug interactions or side effects and help fix them  - Identify preferred medications that are equally effective, but available at a lower cost to you  - Help you reach the necessary requirements to remain enrolled in the [facility] Be Well With Diabetes Program offered by [facility]    Please call [phone] and select option #3 to reschedule this appointment to take advantage of this service.       Sincerely,   [facility] Team  Telephone [phone], Option #3   Fax [phone]

## 2023-01-24 NOTE — TELEPHONE ENCOUNTER
1/24/2023  [facility] CLINICAL PHARMACY REVIEW - Be Well with Diabetes    Reynold Hawkins. is a 65 y.o. male enrolled in the [address] [place], [place] [address]Th [facility] Employee Diabetes Program. Patient provided Dinorah Soto with verbal consent to remain in the program for this year. Patient enrolled 10/07/2020. Insurance through the following employer: [address] [place] [address]    Medications:   Current Outpatient Medications   Medication Sig    multivitamin (ONE A DAY) tablet Take 1 Tablet by mouth daily. ascorbic acid, vitamin C, (Vitamin C) 500 mg tablet Take 500 mg by mouth two (2) times a day. hydroCHLOROthiazide (HYDRODIURIL) 25 mg tablet TAKE 1 TABLET BY MOUTH ONE TIME A DAY    terbinafine HCL (LAMISIL) 250 mg tablet TAKE 1 TABLET BY MOUTH ONE TIME A DAY    montelukast (SINGULAIR) 10 mg tablet TAKE 1 TABLET BY MOUTH IN THE MORNING    aspirin 81 mg chewable tablet CHEW AND SWALLOW 1 TABLET BY MOUTH ONE TIME A DAY    lancets (Ultra Thin Lancets) 33 gauge misc USE TO TEST BLOOD SUGAR TWO TIMES A DAY    glucose blood VI test strips (WaveSense Jazz) strip USE TO TEST BLOOD SUGAR TWO TIMES A DAY    Blood-Glucose Meter ([employer] Wireless 2 Meter kit) monitoring kit USE AS DIRECTED TO MONITOR BLOOD SUGARS    metFORMIN (GLUCOPHAGE) 1,000 mg tablet TAKE 1 TABLET BY MOUTH 2 TIMES A DAY WITH MEALS    naproxen (NAPROSYN) 375 mg tablet TAKE 1 TABLET BY MOUTH 2 TIMES A DAY WITH MEALS    amLODIPine (NORVASC) 5 mg tablet Take 1 Tablet by mouth in the morning. losartan (COZAAR) 25 mg tablet Take 1 Tablet by mouth daily. Ozempic 1 mg/dose (4 mg/3 mL) pnij INJECT 1MG UNDER THE SKIN EVERY 7 DAYS     No current facility-administered medications for this visit. Current Pharmacy: [facility]  Current testing supplies/frequency: Valdo Culp twice a day   Had to get a new glucometer- P helped him do that- the glucometer stopped working properly.     Allergies:  No Known Allergies Vitals/Labs:    BP Readings from Last 3 Encounters:   01/24/23 139/83   07/26/22 125/87   07/12/22 (!) 116/59     Ht Readings from Last 1 Encounters:   01/24/23 6' 2\" (1.88 m)     Wt Readings from Last 3 Encounters:   01/24/23 341 lb 9.6 oz (154.9 kg)   07/26/22 335 lb (152 kg)   07/12/22 330 lb 4 oz (149.8 kg)       Lab Results   Component Value Date/Time    Microalbumin/Creat ratio (mg/g creat) 8 10/16/2019 10:45 AM    Microalb/Creat ratio (ug/mg creat.) 7 01/25/2022 11:38 AM    Microalbumin,urine random 1.17 10/16/2019 10:45 AM     Lab Results   Component Value Date/Time    Hemoglobin A1c 7.4 (H) 04/20/2022 09:00 AM    Hemoglobin A1c 7.4 (H) 01/25/2022 11:38 AM    Hemoglobin A1c 7.7 (H) 06/17/2021 11:04 AM    Hemoglobin A1c (POC) 7.7 (A) 06/06/2019 08:57 AM    Hemoglobin A1c (POC) 7.9 03/07/2019 08:54 AM    Hemoglobin A1c (POC) 8.3 02/06/2019 02:32 PM     NOTE A1c <9%    Lab Results   Component Value Date/Time    Cholesterol, total 194 04/20/2022 09:00 AM    HDL Cholesterol 38 (L) 04/20/2022 09:00 AM    LDL, calculated 117.2 (H) 04/20/2022 09:00 AM    VLDL, calculated 30 01/25/2022 11:38 AM    VLDL, calculated 33.2 10/16/2019 10:45 AM    Triglyceride 194 (H) 04/20/2022 09:00 AM    CHOL/HDL Ratio 5.1 (H) 10/16/2019 10:45 AM     ALT (SGPT)   Date Value Ref Range Status   01/25/2022 18 0 - 44 IU/L Final     AST (SGOT)   Date Value Ref Range Status   01/25/2022 18 0 - 40 IU/L Final     The 10-year ASCVD risk score (Antony WALKER, et al., 2019) is: 26.3%    Values used to calculate the score:      Age: 65 years      Sex: Male      Is Non- : No      Diabetic: Yes      Tobacco smoker: No      Systolic Blood Pressure: 066 mmHg      Is BP treated: Yes      HDL Cholesterol: 38 MG/DL      Total Cholesterol: 194 MG/DL     Lab Results   Component Value Date/Time    Creatinine, POC 1.2 12/11/2013 12:27 PM    Creatinine 1.04 01/25/2022 11:38 AM     CrCL ~117 mL/min (calculated using sCr 1.04 mg/dL, Ht 188cm, aBW 111 kg, using C-G equation) (Pt overweight used ABW- 111 kg- bw-154.9kg)  Lab Results   Component Value Date/Time    GFR est non-AA 78 2022 11:38 AM    GFR est AA 90 2022 11:38 AM     Immunizations:  Immunization History   Administered Date(s) Administered    (RETIRED) Pneumococcal Vaccine (Unspecified Type) 2011    COVID-19, PFIZER PURPLE top, DILUTE for use, (age 15 y+), IM, 30mcg/0.3mL 2021, 2021, 12/10/2021    Influenza Vaccine 2013, 10/15/2019, 10/19/2020, 2021, 2021    Influenza Vaccine Split 2011    Pneumococcal Polysaccharide (PPSV-23) 2011, 2022    TD Vaccine 2008    Tdap 2017    Zoster Recombinant 2022      Social History:  Social History     Tobacco Use    Smoking status: Former     Types: Cigarettes     Quit date: 10/15/1993     Years since quittin.2    Smokeless tobacco: Never    Tobacco comments:        Substance Use Topics    Alcohol use: No     ASSESSMENT:  Initial Program Requirements (Y indicates has completed for the year, N indicates needs to be completed by 2023):PCP appt today 2023  YES - Provider Visit for DM (1st)  NO - A1c (1st) - says future    Ongoing Program Requirements (Y indicates has completed for the year, N indicates needs to be completed by 2023):   NO - Provider Visit for DM (2nd)  YES - ACC/diabetes educator visit (if A1c over 8%)  NO - A1c (2nd)  NO - Lipid panel  NO - Urine microalbumin  NO - Pneumococcal vaccination: [id number]  NO - Influenza vaccination for 2023  NO - Medication adherence over 70%  NO - On statin or contraindication(s) Not identified  YES - On ACEi/ARB or contraindication(s) Losartan     Formulary Medication Review:  Non-formulary or medications with cost-effective alternatives:      Current medications eligible for copay waiver, up to $600, through [id number]:  - aspirin, metformin, amlodipine, losartan, ozempic   - Valdo Culp     Diabetes Care:   - Glycemic Goal: <7.0%. Stable and at blood glucose goal. Type 2 DM under acceptable control as evidenced by A1c.  - Current symptoms/problems include none  - Home blood sugar records:  fasting range: 110-135  - Any episodes of hypoglycemia? no  - Known diabetic complications: cardiovascular disease  - Eye exam current (within one year): yes  - Foot exam current (within one year): yes  - Therapy Optimization: on Metformin + Ozempic  - Medication changes since last A1c: none  - Daily aspirin? Yes  - Medication compliance: compliant all of the time  - Diet compliance: compliant most of the time  - Current exercise: 30 minutes 3 times a week, and wants to do more   - What might prevent you from meeting your goal?: aware of what he needs to do   - Patient plan for overcoming barriers: N/A       Other Considerations:  - Blood Pressure Goal: BP less than 130/80 mmHg due to history of DM: Is not at blood pressure goal but previously at target on current regimen. .   - Lipids: Patient is not prescribed high-intensity statin therapy. - Smoking status: Quit 30 years ago    Patient is very good with compliance, it is very rare. He is very aware of how is BG levels work, and how his diet affects him. Had PCP appt today with labs except for lipids done. No changes to medications at this time.     PLAN:  - Consideration(s) for provider:   Patient not currently on a high-intensity statin- LDL goal <70  - DM program gaps identified:   Initial requirements: Requirements met   Ongoing requirements: Provider visit for DM (2nd), A1c (2nd), Lipid panel, Pneumococcal vaccination: [id number], Influenza vaccination for [id number], and Medication adherence over 70%   - Education to patient: Addressed diet and exercise, Overview of Be Well With Diabetes program, Overview of HHP, and Reminder A1c and lipid panel can be completed for free at Be Well screenings   - Follow up: PCP for identified gaps or as scheduled below  - Upcoming appointments:   Future Appointments   Date Time Provider Fátima Riley   1/24/2023  8:45 AM MD LYNDSAY Bender   1/24/2023  1:30 PM BS PHARMACY RICRCLARI Blanco, PharmD, [address] [place] [address], toll free: [phone] Option 2  For Pharmacy Admin Tracking Only    Program: [address] Closed?: Yes  Time Spent (min): 30

## 2023-01-24 NOTE — PROGRESS NOTES
Aida Bolton is a 65 y.o. male (: 1960) presenting to address:    Chief Complaint   Patient presents with    Shortness of Breath    Complete Physical    Nausea       There were no vitals filed for this visit. Hearing/Vision:     Vision Screening    Right eye Left eye Both eyes   Without correction 20/20 20/0 20/20   With correction          Learning Assessment:     Learning Assessment 3/19/2014   PRIMARY LEARNER Patient   HIGHEST LEVEL OF EDUCATION - PRIMARY LEARNER  2 YEARS OF COLLEGE   BARRIERS PRIMARY LEARNER NONE   CO-LEARNER CAREGIVER No   PRIMARY LANGUAGE ENGLISH   LEARNER PREFERENCE PRIMARY READING     -     -   ANSWERED BY self   RELATIONSHIP SELF     Depression Screening:     3 most recent PHQ Screens 2023   Little interest or pleasure in doing things Not at all   Feeling down, depressed, irritable, or hopeless Not at all   Total Score PHQ 2 0     Fall Risk Assessment:     Fall Risk Assessment, last 12 mths 2020   Able to walk? Yes   Fall in past 12 months? No     Abuse Screening:     Abuse Screening Questionnaire 2020   Do you ever feel afraid of your partner? N   Are you in a relationship with someone who physically or mentally threatens you? N   Is it safe for you to go home? Y     ADL Assessment:   No flowsheet data found. Coordination of Care Questionaire:     1. \"Have you been to the ER, urgent care clinic since your last visit? Hospitalized since your last visit? \" No    2. \"Have you seen or consulted any other health care providers outside of the [address] [place] [address] since your last visit? \" No     3. For patients aged 39-70: Has the patient had a colonoscopy / FIT/ Cologuard? Yes - no Care Gap present      If the patient is female:    4. For patients aged 41-77: Has the patient had a mammogram within the past 2 years? No      5. For patients aged 21-65: Has the patient had a pap smear?  NA - based on age or sex

## 2023-01-24 NOTE — PROGRESS NOTES
Buster Guerra. is a 65 y.o.  male and presents with    Chief Complaint   Patient presents with    Shortness of Breath    Complete Physical    Nausea    Cough     Subjective: Well Adult Physical   Patient here for a comprehensive physical exam.The patient reports problems - increasing shortness of breath noticed last week when he was hiking; he has been able to keep up with the group. He is worried because his father had MI at age 76. He has history of pneumonia 3 years ago. He has h/o covid 5 months ago; he had flu 1 month ago; he has chronic cough associated with h/o covid. Do you take any herbs or supplements that were not prescribed by a doctor? no Are you taking calcium supplements? no Are you taking aspirin daily? yes  Cardiovascular Review:  The patient has diabetes, hypertension, hyperlipidemia, coronary artery disease and obesity. Diet and Lifestyle: generally follows a low fat low cholesterol diet, generally follows a low sodium diet, follows a diabetic diet regularly, exercises regularly, nonsmoker  Home BP Monitoring: is well controlled at home, ranging 130's/85's. Pertinent ROS: taking medications as instructed, no medication side effects noted, no TIA's, no chest pain on exertion, no dyspnea on exertion, no swelling of ankles. Diabetes Mellitus:  He has diabetes mellitus. Diabetic ROS - medication compliance: compliant all of the time, diabetic diet compliance: compliant most of the time, home glucose monitoring: is performed regularly. He reports that he has average 110  Lab review: labs reviewed, I note that glycosylated hemoglobin mildly abnormal but acceptable. ROS   Constitutional: Negative for appetite change, chills, diaphoresis, fatigue and fever. HENT: Negative for congestion and sore throat.     Respiratory: Positive for cough with yellow phlegm for several months; increase dyspnea with exertion  Cardiovascular: Negative for chest pain and palpitations. Gastrointestinal: Negative for nausea and vomiting. Genitourinary: Negative for dysuria. Musculoskeletal: Negative for back pain. Skin: Positive for lesion on back; he is taking   Neurological: Negative for dizziness and headaches    All other systems reviewed and are negative. Objective:    Visit Vitals  /83 (BP 1 Location: Right upper arm, BP Patient Position: Sitting, BP Cuff Size: Adult)   Pulse 95   Temp 97.3 °F (36.3 °C) (Temporal)   Resp 17   Ht 6' 2\" (1.88 m)   Wt 341 lb 9.6 oz (154.9 kg)   SpO2 93%   BMI 43.86 kg/m²       General appearance  alert, cooperative, no distress, appears stated age   Head  Normocephalic, without obvious abnormality, atraumatic   Eyes  conjunctivae/corneas clear. PERRL, EOM's intact. Ears  normal TM's and external ear canals AU   Nose Nares normal. Septum midline. Mucosa normal. No drainage or sinus tenderness. Throat Lips, mucosa, and tongue normal. Teeth and gums normal   Neck supple, symmetrical, trachea midline, no adenopathy, thyroid: not enlarged, symmetric, no tenderness/mass/nodules, no carotid bruit and no JVD   Back   symmetric, no curvature. ROM normal. No CVA tenderness   Lungs   clear to auscultation bilaterally   Chest wall  no tenderness   Heart  regular rate and rhythm, S1, S2 normal, no murmur, click, rub or gallop   Abdomen   soft, non-tender.  Bowel sounds normal. No masses,  No organomegaly   Genitalia  deferred   Rectal  deferred   Extremities extremities normal, atraumatic, no cyanosis or edema   Pulses 2+ and symmetric   Skin Skin color, texture, turgor normal. No rashes or lesions; toenail thickening   Lymph nodes Cervical, supraclavicular, and axillary nodes normal.   Neurologic Normal     Diabetic foot exam:     Left Foot:   Visual Exam: normal    Pulse DP: 2+ (normal)   Filament test: reduced sensation       Right Foot:   Visual Exam: normal    Pulse DP: 2+ (normal)   Filament test: reduced sensation      LABS     TESTS  EKG NSR with left axis deviation    Assessment/Plan:    1. Dyspnea on exertion  No evidence of ischemia on EkG; no lower extremity edema; ddx: heart failure, copd, stenosis  - AMB POC EKG ROUTINE W/ 12 LEADS, INTER & REP  - REFERRAL TO CARDIOLOGY    2. Obesity, Class III, BMI 40-49.9 (morbid obesity) ([facility] [address]  I have reviewed/discussed the above normal BMI with the patient. I have recommended the following interventions: dietary management education, guidance, and counseling and monitor weight . .        3. Type 2 diabetes mellitus with peripheral vascular disease (HCC)  Goal hgb a1c <7; assess for efficacy of treatment and renal involvement  -  DIABETES FOOT EXAM  - MICROALBUMIN, UR, RAND W/ MICROALB/CREAT RATIO; Future  - METABOLIC PANEL, COMPREHENSIVE; Future  - HEMOGLOBIN A1C WITH EAG; Future    4. Carpal tunnel syndrome of right wrist  Recommend nighttime bracing    5. Annual physical exam  Reviewed preventive recommendations    6. Need for shingles vaccine    - ZOSTER, SHINGRIX, (18 YRS +), IM    7. Need for vaccination against Streptococcus pneumoniae    - PNEUMOCOCCAL, PCV20, PREVNAR 20, (AGE 18 YRS+), IM, PF      Lab review: orders written for new lab studies as appropriate; see orders      I have discussed the diagnosis with the patient and the intended plan as seen in the above orders. The patient has received an after-visit summary and questions were answered concerning future plans. I have discussed medication side effects and warnings with the patient as well. I have reviewed the plan of care with the patient, accepted their input and they are in agreement with the treatment goals.

## 2023-01-26 LAB
ALBUMIN SERPL-MCNC: 4.3 G/DL (ref 3.8–4.8)
ALBUMIN/CREAT UR: 4 MG/G CREAT (ref 0–29)
ALBUMIN/GLOB SERPL: 1.9 {RATIO} (ref 1.2–2.2)
ALP SERPL-CCNC: 84 IU/L (ref 44–121)
ALT SERPL-CCNC: 14 IU/L (ref 0–44)
AST SERPL-CCNC: 16 IU/L (ref 0–40)
BILIRUB SERPL-MCNC: 0.5 MG/DL (ref 0–1.2)
BUN SERPL-MCNC: 18 MG/DL (ref 8–27)
BUN/CREAT SERPL: 16 (ref 10–24)
CALCIUM SERPL-MCNC: 9.3 MG/DL (ref 8.6–10.2)
CHLORIDE SERPL-SCNC: 99 MMOL/L (ref 96–106)
CO2 SERPL-SCNC: 25 MMOL/L (ref 20–29)
CREAT SERPL-MCNC: 1.12 MG/DL (ref 0.76–1.27)
CREAT UR-MCNC: 285.2 MG/DL
EGFR: 75 ML/MIN/1.73
EST. AVERAGE GLUCOSE BLD GHB EST-MCNC: 166 MG/DL
GLOBULIN SER CALC-MCNC: 2.3 G/DL (ref 1.5–4.5)
GLUCOSE SERPL-MCNC: 113 MG/DL (ref 70–99)
HBA1C MFR BLD: 7.4 % (ref 4.8–5.6)
MICROALBUMIN UR-MCNC: 12.7 UG/ML
POTASSIUM SERPL-SCNC: 4.3 MMOL/L (ref 3.5–5.2)
PROT SERPL-MCNC: 6.6 G/DL (ref 6–8.5)
SODIUM SERPL-SCNC: 140 MMOL/L (ref 134–144)

## 2023-02-08 ENCOUNTER — TRANSCRIBE ORDERS (OUTPATIENT)
Facility: HOSPITAL | Age: 63
End: 2023-02-08

## 2023-02-08 DIAGNOSIS — R06.09 DYSPNEA ON EXERTION: Primary | ICD-10-CM

## 2023-02-10 ENCOUNTER — PATIENT MESSAGE (OUTPATIENT)
Dept: FAMILY MEDICINE CLINIC | Age: 63
End: 2023-02-10

## 2023-02-13 DIAGNOSIS — R06.09 DYSPNEA ON EXERTION: Primary | ICD-10-CM

## 2023-02-15 RX ORDER — TERBINAFINE HYDROCHLORIDE 250 MG/1
TABLET ORAL
COMMUNITY
Start: 2022-01-29

## 2023-02-15 RX ORDER — MONTELUKAST SODIUM 10 MG/1
10 TABLET ORAL DAILY
COMMUNITY
Start: 2022-07-26

## 2023-02-15 RX ORDER — LOSARTAN POTASSIUM 25 MG/1
25 TABLET ORAL DAILY
COMMUNITY
Start: 2022-05-04

## 2023-02-15 RX ORDER — NAPROXEN 375 MG/1
TABLET ORAL
COMMUNITY
Start: 2022-09-07

## 2023-02-15 RX ORDER — ASPIRIN 81 MG/1
81 TABLET, CHEWABLE ORAL DAILY
COMMUNITY
Start: 2021-06-17

## 2023-02-15 RX ORDER — HYDROCHLOROTHIAZIDE 25 MG/1
TABLET ORAL
COMMUNITY
Start: 2021-11-17

## 2023-02-15 RX ORDER — AMLODIPINE BESYLATE 5 MG/1
5 TABLET ORAL DAILY
COMMUNITY
Start: 2022-07-26 | End: 2023-07-26

## 2023-02-16 RX ORDER — SEMAGLUTIDE 1.34 MG/ML
INJECTION, SOLUTION SUBCUTANEOUS
Qty: 9 ML | Refills: 3 | Status: SHIPPED | OUTPATIENT
Start: 2023-02-16

## 2023-03-21 ENCOUNTER — HOSPITAL ENCOUNTER (OUTPATIENT)
Facility: HOSPITAL | Age: 63
Discharge: HOME | End: 2023-03-23
Payer: COMMERCIAL

## 2023-03-21 VITALS
WEIGHT: 315 LBS | BODY MASS INDEX: 40.43 KG/M2 | DIASTOLIC BLOOD PRESSURE: 83 MMHG | HEIGHT: 74 IN | SYSTOLIC BLOOD PRESSURE: 139 MMHG

## 2023-03-21 DIAGNOSIS — R06.09 DYSPNEA ON EXERTION: ICD-10-CM

## 2023-03-21 LAB
ECHO AO ASC DIAM: 4.3 CM
ECHO AO ASCENDING AORTA INDEX: 1.58 CM/M2
ECHO AO ROOT DIAM: 3.9 CM
ECHO AO ROOT INDEX: 1.43 CM/M2
ECHO AV AREA PEAK VELOCITY: 3.9 CM2
ECHO AV AREA VTI: 4 CM2
ECHO AV AREA/BSA PEAK VELOCITY: 1.4 CM2/M2
ECHO AV AREA/BSA VTI: 1.5 CM2/M2
ECHO AV MEAN GRADIENT: 3 MMHG
ECHO AV MEAN VELOCITY: 0.9 M/S
ECHO AV PEAK GRADIENT: 5 MMHG
ECHO AV PEAK VELOCITY: 1.1 M/S
ECHO AV VELOCITY RATIO: 0.82
ECHO AV VTI: 21.7 CM
ECHO BSA: 2.84 M2
ECHO EST RA PRESSURE: 8 MMHG
ECHO LA VOL 2C: 64 ML (ref 18–58)
ECHO LA VOL 2C: 67 ML (ref 18–58)
ECHO LA VOL 4C: 62 ML (ref 18–58)
ECHO LA VOL 4C: 69 ML (ref 18–58)
ECHO LA VOLUME AREA LENGTH: 71 ML
ECHO LA VOLUME INDEX AREA LENGTH: 26 ML/M2 (ref 16–34)
ECHO LV E' LATERAL VELOCITY: 9 CM/S
ECHO LV E' SEPTAL VELOCITY: 7 CM/S
ECHO LV FRACTIONAL SHORTENING: 26 % (ref 28–44)
ECHO LV INTERNAL DIMENSION DIASTOLE INDEX: 1.83 CM/M2
ECHO LV INTERNAL DIMENSION DIASTOLIC: 5 CM (ref 4.2–5.9)
ECHO LV INTERNAL DIMENSION SYSTOLIC INDEX: 1.36 CM/M2
ECHO LV INTERNAL DIMENSION SYSTOLIC: 3.7 CM
ECHO LV IVSD: 1.2 CM (ref 0.6–1)
ECHO LV MASS 2D: 247.6 G (ref 88–224)
ECHO LV MASS INDEX 2D: 90.7 G/M2 (ref 49–115)
ECHO LV POSTERIOR WALL DIASTOLIC: 1.3 CM (ref 0.6–1)
ECHO LV RELATIVE WALL THICKNESS RATIO: 0.52
ECHO LVOT AREA: 4.9 CM2
ECHO LVOT AV VTI INDEX: 0.81
ECHO LVOT DIAM: 2.5 CM
ECHO LVOT MEAN GRADIENT: 2 MMHG
ECHO LVOT PEAK GRADIENT: 3 MMHG
ECHO LVOT PEAK VELOCITY: 0.9 M/S
ECHO LVOT STROKE VOLUME INDEX: 31.6 ML/M2
ECHO LVOT SV: 86.4 ML
ECHO LVOT VTI: 17.6 CM
ECHO MV A VELOCITY: 0.86 M/S
ECHO MV E DECELERATION TIME (DT): 125.4 MS
ECHO MV E VELOCITY: 0.59 M/S
ECHO MV E/A RATIO: 0.69
ECHO MV E/E' LATERAL: 6.56
ECHO MV E/E' RATIO (AVERAGED): 7.49
ECHO MV E/E' SEPTAL: 8.43
ECHO PV MAX VELOCITY: 1.2 M/S
ECHO PV PEAK GRADIENT: 5 MMHG
ECHO RIGHT VENTRICULAR SYSTOLIC PRESSURE (RVSP): 40 MMHG
ECHO RV FREE WALL PEAK S': 11 CM/S
ECHO RV TAPSE: 2 CM (ref 1.7–?)
ECHO TV REGURGITANT MAX VELOCITY: 2.85 M/S
ECHO TV REGURGITANT PEAK GRADIENT: 32 MMHG
LV EF: 53 %
LVEF MODALITY: ABNORMAL

## 2023-03-21 PROCEDURE — C8929 TTE W OR WO FOL WCON,DOPPLER: HCPCS

## 2023-03-21 PROCEDURE — 6360000004 HC RX CONTRAST MEDICATION: Performed by: FAMILY MEDICINE

## 2023-03-21 PROCEDURE — 93306 TTE W/DOPPLER COMPLETE: CPT | Performed by: INTERNAL MEDICINE

## 2023-03-21 RX ADMIN — PERFLUTREN 2 ML: 6.52 INJECTION, SUSPENSION INTRAVENOUS at 15:37

## 2023-04-05 ENCOUNTER — CLINICAL DOCUMENTATION (OUTPATIENT)
Dept: PHARMACY | Facility: CLINIC | Age: 63
End: 2023-04-05

## 2023-04-05 NOTE — PROGRESS NOTES
Pharmacy Pop Care Documentation:      The application for John Sorensen for enrollment into the diabetes management program has been reviewed and accepted on 4/5/23.     [facility]

## 2023-04-18 ENCOUNTER — OFFICE VISIT (OUTPATIENT)
Age: 63
End: 2023-04-18
Payer: COMMERCIAL

## 2023-04-18 VITALS
OXYGEN SATURATION: 94 % | SYSTOLIC BLOOD PRESSURE: 132 MMHG | DIASTOLIC BLOOD PRESSURE: 86 MMHG | BODY MASS INDEX: 44.42 KG/M2 | WEIGHT: 315 LBS | HEART RATE: 99 BPM | TEMPERATURE: 98.6 F

## 2023-04-18 DIAGNOSIS — R06.09 DYSPNEA ON EXERTION: Primary | ICD-10-CM

## 2023-04-18 DIAGNOSIS — R94.31 ABNORMAL EKG: ICD-10-CM

## 2023-04-18 PROCEDURE — 99204 OFFICE O/P NEW MOD 45 MIN: CPT | Performed by: INTERNAL MEDICINE

## 2023-04-18 PROCEDURE — 3075F SYST BP GE 130 - 139MM HG: CPT | Performed by: INTERNAL MEDICINE

## 2023-04-18 PROCEDURE — 3079F DIAST BP 80-89 MM HG: CPT | Performed by: INTERNAL MEDICINE

## 2023-04-18 NOTE — PROGRESS NOTES
Medication's patient's would liked removed has been marked not taking to be removed per Verbal order and read back per Idalmis Bettencourt MD
Aortic Valve: Trace regurgitation. No stenosis. Mitral Valve: No regurgitation. No stenosis noted. Tricuspid Valve: Trace regurgitation. No stenosis noted. Mildly elevated RVSP. The estimated RVSP is 40 mmHg. Pulmonary Arteries: Mild pulmonary hypertension present. Aorta: Dilated aortic root. Ao root diameter is 3.9 cm. Dilated ascending aorta. Ao ascending diameter is 4.3 cm. Descending aorta is not well-visualized. IMPRESSION & PLAN:  Goldie Aguero.  is 65 y.o. male with    Dyspnea and fatigue: According to patient and the spouse, symptoms may be having dyspnea on exertion  EKG showed late R wave transition and possible old anteroseptal infarct with Q wave in V1 and V2. He does not have a typical chest pain or chest tightness however he has risk factor according to wife he occasionally grabs his chest at nighttime during the sleep. During the daytime he does not have any chest discomfort. Underlying CAD should be ruled out. We will proceed with stress test    Hypertension: Currently on amlodipine and hydrochlorothiazide. .  /89. Salt restriction, dietary potassium, weight loss and exercise discussed    Sleep apnea:  Patient was diagnosed with sleep apnea more than 10 years ago and he was not able to tolerate machine. He is willing to establish care with sleep specialist.  We will refer him. His pulmonary hypertension is most likely sleep apnea. Obesity: Current weight 346 pounds with BMI of 45. Importance of diet and exercise was discussed with patient. This plan was discussed with patient who is in agreement. Thank you for allowing me to participate in patient care. Please feel free to call me if you have any question or concern. Teresa Riojas MD  Please note: This document has been produced using voice recognition software. Unrecognized errors in transcription may be present.

## 2023-04-18 NOTE — PATIENT INSTRUCTIONS
Testing   Nuclear Stress    Please call [facility] central scheduling at [phone] to schedule testing. Nuclear Stress Instructions-Nothing to eat or drink past midnight and no medicaitons the morning of cardiac testing. **call office 3-5 days after testing is completed for results** Please ensure testing is completed prior to scheduled follow up appointment.  If it is not completed your appointment may be rescheduled**        New Location Address- projected for the month of May 2023    [address] [facility] [address], [place] [address]

## 2023-04-21 LAB
CHOLEST SERPL-MCNC: 197 MG/DL
EST. AVERAGE GLUCOSE BLD GHB EST-MCNC: 166 MG/DL
GLUCOSE SERPL-MCNC: 140 MG/DL (ref 74–99)
HBA1C MFR BLD: 7.4 % (ref 4.2–5.6)
HDLC SERPL-MCNC: 44 MG/DL (ref 40–60)
LDLC SERPL CALC-MCNC: 118.8 MG/DL (ref 0–100)
TRIGL SERPL-MCNC: 171 MG/DL (ref ?–150)

## 2023-04-24 ENCOUNTER — OFFICE VISIT (OUTPATIENT)
Facility: CLINIC | Age: 63
End: 2023-04-24
Payer: COMMERCIAL

## 2023-04-24 ENCOUNTER — TELEPHONE (OUTPATIENT)
Dept: PHARMACY | Facility: CLINIC | Age: 63
End: 2023-04-24

## 2023-04-24 VITALS
TEMPERATURE: 97.3 F | OXYGEN SATURATION: 95 % | DIASTOLIC BLOOD PRESSURE: 76 MMHG | SYSTOLIC BLOOD PRESSURE: 130 MMHG | HEART RATE: 71 BPM | WEIGHT: 315 LBS | HEIGHT: 74 IN | BODY MASS INDEX: 40.43 KG/M2 | RESPIRATION RATE: 16 BRPM

## 2023-04-24 DIAGNOSIS — I10 ESSENTIAL (PRIMARY) HYPERTENSION: ICD-10-CM

## 2023-04-24 DIAGNOSIS — E78.00 HYPERCHOLESTEROLEMIA: ICD-10-CM

## 2023-04-24 DIAGNOSIS — G47.33 OBSTRUCTIVE SLEEP APNEA: ICD-10-CM

## 2023-04-24 DIAGNOSIS — D23.22 BENIGN NEOPLASM OF SKIN OF LEFT EAR: ICD-10-CM

## 2023-04-24 DIAGNOSIS — Z00.00 ANNUAL PHYSICAL EXAM: Primary | ICD-10-CM

## 2023-04-24 DIAGNOSIS — K59.01 SLOW TRANSIT CONSTIPATION: ICD-10-CM

## 2023-04-24 DIAGNOSIS — E66.01 MORBID (SEVERE) OBESITY DUE TO EXCESS CALORIES (HCC): ICD-10-CM

## 2023-04-24 DIAGNOSIS — E11.51 TYPE 2 DIABETES MELLITUS WITH DIABETIC PERIPHERAL ANGIOPATHY WITHOUT GANGRENE, WITHOUT LONG-TERM CURRENT USE OF INSULIN (HCC): ICD-10-CM

## 2023-04-24 PROCEDURE — 99396 PREV VISIT EST AGE 40-64: CPT | Performed by: FAMILY MEDICINE

## 2023-04-24 PROCEDURE — 3075F SYST BP GE 130 - 139MM HG: CPT | Performed by: FAMILY MEDICINE

## 2023-04-24 PROCEDURE — 3078F DIAST BP <80 MM HG: CPT | Performed by: FAMILY MEDICINE

## 2023-04-24 RX ORDER — SEMAGLUTIDE 2.68 MG/ML
2 INJECTION, SOLUTION SUBCUTANEOUS
Qty: 9 ML | Refills: 4 | Status: SHIPPED | OUTPATIENT
Start: 2023-04-24

## 2023-04-24 SDOH — ECONOMIC STABILITY: FOOD INSECURITY: WITHIN THE PAST 12 MONTHS, THE FOOD YOU BOUGHT JUST DIDN'T LAST AND YOU DIDN'T HAVE MONEY TO GET MORE.: NEVER TRUE

## 2023-04-24 SDOH — ECONOMIC STABILITY: INCOME INSECURITY: HOW HARD IS IT FOR YOU TO PAY FOR THE VERY BASICS LIKE FOOD, HOUSING, MEDICAL CARE, AND HEATING?: NOT VERY HARD

## 2023-04-24 SDOH — ECONOMIC STABILITY: HOUSING INSECURITY
IN THE LAST 12 MONTHS, WAS THERE A TIME WHEN YOU DID NOT HAVE A STEADY PLACE TO SLEEP OR SLEPT IN A SHELTER (INCLUDING NOW)?: NO

## 2023-04-24 SDOH — ECONOMIC STABILITY: FOOD INSECURITY: WITHIN THE PAST 12 MONTHS, YOU WORRIED THAT YOUR FOOD WOULD RUN OUT BEFORE YOU GOT MONEY TO BUY MORE.: NEVER TRUE

## 2023-04-24 ASSESSMENT — PATIENT HEALTH QUESTIONNAIRE - PHQ9
SUM OF ALL RESPONSES TO PHQ QUESTIONS 1-9: 0
1. LITTLE INTEREST OR PLEASURE IN DOING THINGS: 0
SUM OF ALL RESPONSES TO PHQ QUESTIONS 1-9: 0
2. FEELING DOWN, DEPRESSED OR HOPELESS: 0
SUM OF ALL RESPONSES TO PHQ9 QUESTIONS 1 & 2: 0
SUM OF ALL RESPONSES TO PHQ QUESTIONS 1-9: 0
SUM OF ALL RESPONSES TO PHQ QUESTIONS 1-9: 0

## 2023-04-24 ASSESSMENT — ANXIETY QUESTIONNAIRES
6. BECOMING EASILY ANNOYED OR IRRITABLE: 0
4. TROUBLE RELAXING: 0
GAD7 TOTAL SCORE: 0
2. NOT BEING ABLE TO STOP OR CONTROL WORRYING: 0
7. FEELING AFRAID AS IF SOMETHING AWFUL MIGHT HAPPEN: 0
5. BEING SO RESTLESS THAT IT IS HARD TO SIT STILL: 0
IF YOU CHECKED OFF ANY PROBLEMS ON THIS QUESTIONNAIRE, HOW DIFFICULT HAVE THESE PROBLEMS MADE IT FOR YOU TO DO YOUR WORK, TAKE CARE OF THINGS AT HOME, OR GET ALONG WITH OTHER PEOPLE: NOT DIFFICULT AT ALL
1. FEELING NERVOUS, ANXIOUS, OR ON EDGE: 0
3. WORRYING TOO MUCH ABOUT DIFFERENT THINGS: 0

## 2023-04-24 ASSESSMENT — LIFESTYLE VARIABLES
HOW OFTEN DO YOU HAVE A DRINK CONTAINING ALCOHOL: NEVER
HOW MANY STANDARD DRINKS CONTAINING ALCOHOL DO YOU HAVE ON A TYPICAL DAY: PATIENT DOES NOT DRINK

## 2023-04-24 NOTE — PROGRESS NOTES
Shane Mcleod. is a 65 y.o.  male and presents with    Chief Complaint   Patient presents with    Annual Exam    Cough       Subjective: Well Adult Physical   Patient here for a comprehensive physical exam.The patient reports problems - diabetes, obstructive sleep apnea with sleep study ordered by cardiologist  Ongoing cough with phlegm for greater than 3 months; he has used nasal spray with menthol but no nasal steroid; he coughs up phlegm in the morning  Do you take any herbs or supplements that were not prescribed by a doctor? no Are you taking calcium supplements? no Are you taking aspirin daily? yes    Cardiovascular Review:  The patient has diabetes, hypertension, hyperlipidemia, coronary artery disease and obesity. Diet and Lifestyle: generally follows a low fat low cholesterol diet, generally follows a low sodium diet, follows a diabetic diet regularly, exercises regularly, nonsmoker  Home BP Monitoring: is well controlled at home, ranging 130's/85's. Pertinent ROS: taking medications as instructed, no medication side effects noted, no TIA's, no chest pain on exertion, no dyspnea on exertion, no swelling of ankles. Diabetes Mellitus:  He has diabetes mellitus. Diabetic ROS - medication compliance: compliant all of the time, diabetic diet compliance: compliant most of the time, home glucose monitoring: is performed regularly. He reports that he has average 110  Lab review: labs reviewed, I note that glycosylated hemoglobin mildly abnormal but acceptable. ROS   Constitutional: Negative for appetite change, chills, diaphoresis, fatigue and fever. HENT: Negative for congestion and sore throat. Respiratory: Positive for cough with yellow phlegm for several months; increase dyspnea with exertion  Cardiovascular: Negative for chest pain and palpitations.   Gastrointestinal: Negative for nausea and vomiting; + constipation with every other day hard stool  Genitourinary: Negative for

## 2023-04-24 NOTE — PROGRESS NOTES
Isaura Sanders. is a 65 y.o. presents today for   Chief Complaint   Patient presents with    Annual Exam         Depression Screening:   PHQ-9 Questionaire 4/24/2023 1/24/2023 7/26/2022 1/25/2022   Little interest or pleasure in doing things 0 0 0 0   Feeling down, depressed, or hopeless 0 0 0 0   PHQ-9 Total Score 0 0 0 0       Abuse Screening: AMB Abuse Screening 4/24/2023 4/18/2023   Do you ever feel afraid of your partner? N N   Are you in a relationship with someone who physically or mentally threatens you? N N   Is it safe for you to go home? Y Y       Learning Assessment:  No question data found. Fall Risk:  Fall Risk 4/24/2023 4/18/2023   2 or more falls in past year? no no   Fall with injury in past year? no no           Coordination of Care:   1. \"Have you been to the ER, urgent care clinic since your last visit? Hospitalized since your last visit? \" no    2. \"Have you seen or consulted any other health care providers outside of the [address] [place] [address] since your last visit? \" no    3. For patients aged 39-70: Has the patient had a colonoscopy / FIT/ Cologuard? yes    If the patient is female:    4. For patients aged 41-77: Has the patient had a mammogram within the past 2 years? no    5. For patients aged 21-65: Has the patient had a pap smear? no    Health Maintenance: reviewed and discussed and ordered per Provider.     Health Maintenance Due   Topic Date Due    HIV screen  Never done    Diabetic retinal exam  03/06/2019    COVID-19 Vaccine (4 - Booster for Pfizer series) 02/04/2022    Lipids  04/20/2023

## 2023-05-01 NOTE — TELEPHONE ENCOUNTER
Provider done note with no response.      Phil Zarate, PharmD, [address] Pharmacist  Department: [phone]

## 2023-05-09 ENCOUNTER — HOSPITAL ENCOUNTER (OUTPATIENT)
Facility: HOSPITAL | Age: 63
Discharge: HOME | End: 2023-05-12
Payer: COMMERCIAL

## 2023-05-09 ENCOUNTER — HOSPITAL ENCOUNTER (OUTPATIENT)
Facility: HOSPITAL | Age: 63
Discharge: HOME | End: 2023-05-11
Payer: COMMERCIAL

## 2023-05-09 VITALS
WEIGHT: 315 LBS | SYSTOLIC BLOOD PRESSURE: 129 MMHG | HEIGHT: 74 IN | DIASTOLIC BLOOD PRESSURE: 75 MMHG | BODY MASS INDEX: 40.43 KG/M2

## 2023-05-09 DIAGNOSIS — R94.31 ABNORMAL EKG: ICD-10-CM

## 2023-05-09 DIAGNOSIS — R06.09 DYSPNEA ON EXERTION: ICD-10-CM

## 2023-05-09 LAB
ECHO BSA: 2.85 M2
EKG DIAGNOSIS: NORMAL
NUC STRESS EJECTION FRACTION: 49 %
STRESS BASELINE DIAS BP: 84 MMHG
STRESS BASELINE HR: 76 BPM
STRESS BASELINE ST DEPRESSION: 0 MM
STRESS BASELINE SYS BP: 129 MMHG
STRESS ESTIMATED WORKLOAD: 8.7 METS
STRESS EXERCISE DUR MIN: 7 MIN
STRESS EXERCISE DUR SEC: 4 SEC
STRESS PEAK DIAS BP: 86 MMHG
STRESS PEAK SYS BP: 160 MMHG
STRESS PERCENT HR ACHIEVED: 87 %
STRESS POST PEAK HR: 139 BPM
STRESS RATE PRESSURE PRODUCT: NORMAL BPM*MMHG
STRESS TARGET HR: 159 BPM
TID: 1.01

## 2023-05-09 PROCEDURE — 93017 CV STRESS TEST TRACING ONLY: CPT

## 2023-05-09 PROCEDURE — 2580000003 HC RX 258: Performed by: INTERNAL MEDICINE

## 2023-05-09 PROCEDURE — 3430000000 HC RX DIAGNOSTIC RADIOPHARMACEUTICAL: Performed by: INTERNAL MEDICINE

## 2023-05-09 PROCEDURE — A9502 TC99M TETROFOSMIN: HCPCS | Performed by: INTERNAL MEDICINE

## 2023-05-09 RX ORDER — 0.9 % SODIUM CHLORIDE 0.9 %
250 INTRAVENOUS SOLUTION INTRAVENOUS ONCE
Status: COMPLETED | OUTPATIENT
Start: 2023-05-09 | End: 2023-05-09

## 2023-05-09 RX ADMIN — TETROFOSMIN 33 MILLICURIE: 1.38 INJECTION, POWDER, LYOPHILIZED, FOR SOLUTION INTRAVENOUS at 11:40

## 2023-05-09 RX ADMIN — SODIUM CHLORIDE 250 ML: 900 INJECTION, SOLUTION INTRAVENOUS at 11:40

## 2023-05-09 RX ADMIN — TETROFOSMIN 11 MILLICURIE: 1.38 INJECTION, POWDER, LYOPHILIZED, FOR SOLUTION INTRAVENOUS at 09:10

## 2023-05-23 ENCOUNTER — TELEPHONE (OUTPATIENT)
Age: 63
End: 2023-05-23

## 2023-05-23 NOTE — TELEPHONE ENCOUNTER
----- Message from Josi Gonzalez MD sent at 5/15/2023  7:47 AM EDT -----  Please inform patient regarding test finding  Appears normal.    Thanks  SP

## 2023-07-14 RX ORDER — LOSARTAN POTASSIUM 25 MG/1
TABLET ORAL
Qty: 90 TABLET | Refills: 3 | Status: SHIPPED | OUTPATIENT
Start: 2023-07-14

## 2023-08-29 ENCOUNTER — TELEPHONE (OUTPATIENT)
Dept: PHARMACY | Facility: CLINIC | Age: 63
End: 2023-08-29

## 2023-08-29 NOTE — TELEPHONE ENCOUNTER
Jose Cardona MD  - I tried to reach patient regarding statin education  - Rosuvastatin prescribed in 2022, however was never filled  - If you could discuss with patient at [facility]    Thank you,  Eliazar Koehler, PharmD, [address] Pharmacist  Department: [phone]  ===============================================    POPULATION HEALTH CLINICAL PHARMACY REVIEW - BE WELL WITH DIABETES: Statin, ACE/ARB Gaps  =================================================================  Henrik Manning. is a 59 y.o. male enrolled in [facility] with Diabetes    Identified care gap: Patient with diabetes not currently filling therapy    Pertinent medications  Current Outpatient Medications   Medication Sig Dispense Refill    losartan (COZAAR) 25 MG tablet TAKE 1 TABLET BY MOUTH ONCE DAILY 90 tablet 3    Semaglutide, 2 MG/DOSE, (OZEMPIC, 2 MG/DOSE,) 8 MG/3ML SOPN Inject 2 mg into the skin every 7 days 9 mL 4    amLODIPine (NORVASC) 5 MG tablet Take 1 tablet by mouth daily      aspirin 81 MG chewable tablet Take 1 tablet by mouth daily      hydroCHLOROthiazide (HYDRODIURIL) 25 MG tablet TAKE 1 TABLET BY MOUTH ONE TIME A DAY      metFORMIN (GLUCOPHAGE) 1000 MG tablet TAKE 1 TABLET BY MOUTH 2 TIMES A DAY WITH MEALS      montelukast (SINGULAIR) 10 MG tablet Take 1 tablet by mouth daily      naproxen (NAPROSYN) 375 MG tablet TAKE 1 TABLET BY MOUTH 2 TIMES A DAY WITH MEALS      terbinafine (LAMISIL) 250 MG tablet TAKE 1 TABLET BY MOUTH ONE TIME A DAY       No current facility-administered medications for this visit.         Pertinent Labs/Vitals:  Lab Results   Component Value Date    LABA1C 7.4 (H) 04/21/2023    LABA1C 7.4 (H) 01/24/2023    LABA1C 7.4 (H) 04/20/2022       STATIN:  Lab Results   Component Value Date    LDLCALC 118.8 (H) 04/21/2023    LDLCALC 117.2 (H) 04/20/2022    LDLCALC 145 (H) 01/25/2022     Lab Results   Component Value Date/Time    ALT 14 01/24/2023 10:29 AM

## 2023-08-30 NOTE — TELEPHONE ENCOUNTER
Yelitza Ulloa MD  - Patient did return my call. - Patient is willing to start statin if you feel he would benefit from it  - Rosuvastatin 20 mg was prescribed in the past, patient agreeable to this if you would send 90 day script to [facility] delivery pharmacy    Thank you!   Guillaume Conway, PharmD, [address] Pharmacist  Department: [phone]    For Pharmacy Admin Tracking Only    Program: Mare in place:  No  Recommendation Provided To: Provider: 1 via Note to Provider and Patient/Caregiver: 1 via Telephone  Intervention Detail: New Rx: 1, reason: Needs Additional Therapy  Intervention Accepted By: Provider: 1 and Patient/Caregiver: 1  Gap Closed?: Yes   Time Spent (min): 20

## 2023-09-05 ENCOUNTER — OFFICE VISIT (OUTPATIENT)
Age: 63
End: 2023-09-05
Payer: COMMERCIAL

## 2023-09-05 VITALS
BODY MASS INDEX: 42.66 KG/M2 | RESPIRATION RATE: 18 BRPM | WEIGHT: 315 LBS | DIASTOLIC BLOOD PRESSURE: 91 MMHG | OXYGEN SATURATION: 94 % | SYSTOLIC BLOOD PRESSURE: 166 MMHG | HEIGHT: 72 IN | HEART RATE: 78 BPM | TEMPERATURE: 97.2 F

## 2023-09-05 DIAGNOSIS — G47.33 OSA (OBSTRUCTIVE SLEEP APNEA): Primary | ICD-10-CM

## 2023-09-05 DIAGNOSIS — Z91.199 NONCOMPLIANCE WITH CPAP TREATMENT: ICD-10-CM

## 2023-09-05 DIAGNOSIS — I10 ESSENTIAL HYPERTENSION, MALIGNANT: ICD-10-CM

## 2023-09-05 DIAGNOSIS — E11.51 TYPE 2 DIABETES MELLITUS WITH PERIPHERAL VASCULAR DISEASE (HCC): ICD-10-CM

## 2023-09-05 DIAGNOSIS — E66.01 OBESITY, MORBID (HCC): ICD-10-CM

## 2023-09-05 DIAGNOSIS — G47.63 BRUXISM, SLEEP-RELATED: ICD-10-CM

## 2023-09-05 DIAGNOSIS — I25.10 CORONARY ARTERY DISEASE INVOLVING NATIVE HEART WITHOUT ANGINA PECTORIS, UNSPECIFIED VESSEL OR LESION TYPE: ICD-10-CM

## 2023-09-05 DIAGNOSIS — I73.9 PERIPHERAL VASCULAR DISEASE (HCC): ICD-10-CM

## 2023-09-05 PROCEDURE — 3077F SYST BP >= 140 MM HG: CPT | Performed by: OTOLARYNGOLOGY

## 2023-09-05 PROCEDURE — 3080F DIAST BP >= 90 MM HG: CPT | Performed by: OTOLARYNGOLOGY

## 2023-09-05 PROCEDURE — 99204 OFFICE O/P NEW MOD 45 MIN: CPT | Performed by: OTOLARYNGOLOGY

## 2023-09-05 PROCEDURE — 3051F HG A1C>EQUAL 7.0%<8.0%: CPT | Performed by: OTOLARYNGOLOGY

## 2023-09-05 ASSESSMENT — SLEEP AND FATIGUE QUESTIONNAIRES
HOW LIKELY ARE YOU TO NOD OFF OR FALL ASLEEP IN A CAR, WHILE STOPPED FOR A FEW MINUTES IN TRAFFIC: 0
NECK CIRCUMFERENCE (INCHES): 20
HOW LIKELY ARE YOU TO NOD OFF OR FALL ASLEEP WHEN YOU ARE A PASSENGER IN A CAR FOR AN HOUR WITHOUT A BREAK: 0
ESS TOTAL SCORE: 0
HOW LIKELY ARE YOU TO NOD OFF OR FALL ASLEEP WHILE SITTING AND READING: 0
HOW LIKELY ARE YOU TO NOD OFF OR FALL ASLEEP WHILE LYING DOWN TO REST IN THE AFTERNOON WHEN CIRCUMSTANCES PERMIT: 0
HOW LIKELY ARE YOU TO NOD OFF OR FALL ASLEEP WHILE SITTING QUIETLY AFTER LUNCH WITHOUT ALCOHOL: 0
HOW LIKELY ARE YOU TO NOD OFF OR FALL ASLEEP WHILE SITTING AND TALKING TO SOMEONE: 0
HOW LIKELY ARE YOU TO NOD OFF OR FALL ASLEEP WHILE SITTING INACTIVE IN A PUBLIC PLACE: 0
HOW LIKELY ARE YOU TO NOD OFF OR FALL ASLEEP WHILE WATCHING TV: 0

## 2023-09-05 ASSESSMENT — ENCOUNTER SYMPTOMS
COUGH: 0
CHEST TIGHTNESS: 0
STRIDOR: 0
SHORTNESS OF BREATH: 0
WHEEZING: 0
CHOKING: 0

## 2023-09-05 ASSESSMENT — PATIENT HEALTH QUESTIONNAIRE - PHQ9
SUM OF ALL RESPONSES TO PHQ QUESTIONS 1-9: 0
2. FEELING DOWN, DEPRESSED OR HOPELESS: 0
SUM OF ALL RESPONSES TO PHQ QUESTIONS 1-9: 0
SUM OF ALL RESPONSES TO PHQ9 QUESTIONS 1 & 2: 0
1. LITTLE INTEREST OR PLEASURE IN DOING THINGS: 0

## 2023-09-05 NOTE — PATIENT INSTRUCTIONS
Please make a follow up appointment to discuss the results of your sleep study. If this is impossible for some reason, please send me a \"My Chart\" message so that I may get back with you in a timely manner. The [facility] is located in the [address] [facility], adjacent to [facility]. The lab is on the second floor. The direct number to call for sleep study related questions is: [phone]. Please call our clinic back at [phone] or send a message on VIVA if you have any questions or concerns or if you are experiencing any of the following: You have not received a follow up appointment within 30 days prior the recommended follow up time. If you are not tolerating treatment plan and/or not able to obtain equipment or prescribed medication(s). if you are experiencing any difficulties with the [address] [place] [address] [facility]  (DME) Company you may be using or is assigned to you. Two weeks have passed and you have not received an appointment for a scheduled procedure. Two weeks have passed since you underwent a test and/or procedure and you have not received your results. If you are using a CPAP/BIPAP, or Home Ventilator Device- Please note the following. Currently, many DMEs are experiencing supply chain difficulties and orders for equipment may be back logged several weeks. Your  Durable Medical Equipment (DME ) company is supposed to provide you with replacement filters, tubing and masks. You can either call your DME when you need new supplies or you can arrange for an automatic shipment schedule. Your need to be seen by our office at lat minimum of every 12 months in order to renew the prescription for these supplies. Please make note of who your DME company is and their phone number. Please make sure that you clean your mask and hosing on a regular basis.   Your DME can provide you with additional information regarding proper care and cleaning of your

## 2023-09-05 NOTE — PROGRESS NOTES
[facility]  Sleep Medicine    Office Progress Note - Initial Evaluation    REHABILITATION [facility] SPECIALTY  [facility] PULMONARY SPECIALISTS  [address]  [address]  [facility] [address]  Dept: [phone]  Dept Fax: [phone]    Initial visit for Obstructive sleep apnea    Assessment:      Visit Diagnoses and Associated Orders       KHARI (obstructive sleep apnea)    -  Primary    SLEEP STUDY LIMITED [18336 CPT(R)]   - Future Order         Noncompliance with CPAP treatment                 Bruxism, sleep-related                 Essential hypertension, malignant                 Peripheral vascular disease ([address])                 Type 2 diabetes mellitus with peripheral vascular disease (HCC)                 Obesity, morbid ([address])                 Coronary artery disease involving native heart without angina pectoris, unspecified vessel or lesion type                          Plan: This information was analyzed to assess complexity and medical decision making in regards to further testing and management. Continue meds for: HTN, type II diabetes. . Pt would medically benefit from wt loss for KHARI (diet, exercise, surgical). Orders Placed This Encounter   Procedures    SLEEP STUDY LIMITED     Mr. Mary Marie is a 71-year-old male with a remote history of obstructive sleep apnea who cannot tolerate CPAP. He has some chronic medical conditions including high blood pressure, hypercholesterolemia, obesity and type 2 diabetes that are being managed by his primary care doctor. He was referred to cardiology earlier this year for a dyspnea on exertion an episode that occurred in January while duck hunting. His cardiac testing has been nonrevealing to this point. I did discuss why it would be important to evaluate for obstructive sleep apnea as it relates to his other chronic medical conditions.   I discussed the different types of sleep studies and I believe a home sleep apnea test is a

## 2023-09-05 NOTE — PROGRESS NOTES
Henrik Mily Reddy. presents today for   Chief Complaint   Patient presents with    Sleep Problem    Snoring       Is someone accompanying this pt? no    Is the patient using any DME equipment during OV? no    -DME Company N/A    Have you ever had a sleep study done before? yes, over 20 years ago    Depression Screening:  PHQ-9  9/5/2023   Little interest or pleasure in doing things 0   Little interest or pleasure in doing things -   Feeling down, depressed, or hopeless 0   PHQ-2 Score 0   Total Score PHQ 2 -   PHQ-9 Total Score 0        [place] Sleepiness Scale:  Sleep Medicine 9/5/2023   Sitting and reading 0   Watching TV 0   Sitting, inactive in a public place (e.g. a theatre or a meeting) 0   As a passenger in a car for an hour without a break 0   Lying down to rest in the afternoon when circumstances permit 0   Sitting and talking to someone 0   Sitting quietly after a lunch without alcohol 0   In a car, while stopped for a few minutes in traffic 0   [place] Sleepiness Score 0   Neck circumference (Inches) 20       Stop-Bang:  STOP-BANG QUESTIONNAIRE 9/5/2023   Are you a loud and/or regular snorer? 1   Do you often feel tired or groggy upon awakening or do you awaken with a headache? 0   Have you been observed to gasp or stop breathing during sleep? 0   Are you often tired or fatigued during wake time hours? 0   Do you fall asleep sitting, reading, watching TV or driving? 0   Do you often have problems with memory or concentration? 0   Do you have or are you being treated for high blood pressure? 1   Recent BMI (Calculated) 44.1   Is BMI greater than 35 kg/m2? 1=Yes   Age older than 48years old? 1=Yes   Is your neck circumference greater than 17 inches (Male) or 16 inches (Female)? 1   Gender - Male 1=Yes   STOP-Bang Total Score 6         Coordination of Care:  1. Have you been to the ER, urgent care clinic since your last visit? Hospitalized since your last visit? no    2.  Have you seen or consulted any

## 2023-09-07 DIAGNOSIS — E11.51 TYPE 2 DIABETES MELLITUS WITH PERIPHERAL VASCULAR DISEASE (HCC): Primary | ICD-10-CM

## 2023-09-07 RX ORDER — ROSUVASTATIN CALCIUM 20 MG/1
20 TABLET, COATED ORAL DAILY
Qty: 90 TABLET | Refills: 3 | Status: SHIPPED | OUTPATIENT
Start: 2023-09-07

## 2023-09-19 ENCOUNTER — HOSPITAL ENCOUNTER (OUTPATIENT)
Dept: SLEEP MEDICINE | Facility: HOSPITAL | Age: 63
Discharge: HOME | End: 2023-09-22
Attending: OTOLARYNGOLOGY
Payer: COMMERCIAL

## 2023-09-19 DIAGNOSIS — I10 ESSENTIAL HYPERTENSION, MALIGNANT: ICD-10-CM

## 2023-09-19 DIAGNOSIS — I25.10 CORONARY ARTERY DISEASE INVOLVING NATIVE HEART WITHOUT ANGINA PECTORIS, UNSPECIFIED VESSEL OR LESION TYPE: ICD-10-CM

## 2023-09-19 DIAGNOSIS — E11.51 TYPE 2 DIABETES MELLITUS WITH PERIPHERAL VASCULAR DISEASE (HCC): ICD-10-CM

## 2023-09-19 DIAGNOSIS — I73.9 PERIPHERAL VASCULAR DISEASE (HCC): ICD-10-CM

## 2023-09-19 DIAGNOSIS — G47.33 OSA (OBSTRUCTIVE SLEEP APNEA): ICD-10-CM

## 2023-09-19 DIAGNOSIS — G47.63 BRUXISM, SLEEP-RELATED: ICD-10-CM

## 2023-09-19 DIAGNOSIS — Z91.199 NONCOMPLIANCE WITH CPAP TREATMENT: ICD-10-CM

## 2023-09-19 DIAGNOSIS — E66.01 OBESITY, MORBID (HCC): ICD-10-CM

## 2023-09-19 PROCEDURE — 95800 SLP STDY UNATTENDED: CPT

## 2023-10-24 ENCOUNTER — OFFICE VISIT (OUTPATIENT)
Facility: CLINIC | Age: 63
End: 2023-10-24
Payer: COMMERCIAL

## 2023-10-24 VITALS
DIASTOLIC BLOOD PRESSURE: 80 MMHG | SYSTOLIC BLOOD PRESSURE: 128 MMHG | RESPIRATION RATE: 16 BRPM | BODY MASS INDEX: 42.66 KG/M2 | HEART RATE: 78 BPM | TEMPERATURE: 97.9 F | OXYGEN SATURATION: 94 % | WEIGHT: 315 LBS | HEIGHT: 72 IN

## 2023-10-24 DIAGNOSIS — Z63.79 STRESS DUE TO ILLNESS OF FAMILY MEMBER: ICD-10-CM

## 2023-10-24 DIAGNOSIS — I10 ESSENTIAL (PRIMARY) HYPERTENSION: ICD-10-CM

## 2023-10-24 DIAGNOSIS — E66.01 MORBID (SEVERE) OBESITY DUE TO EXCESS CALORIES (HCC): ICD-10-CM

## 2023-10-24 DIAGNOSIS — E11.51 TYPE 2 DIABETES MELLITUS WITH DIABETIC PERIPHERAL ANGIOPATHY WITHOUT GANGRENE, WITHOUT LONG-TERM CURRENT USE OF INSULIN (HCC): Primary | ICD-10-CM

## 2023-10-24 PROCEDURE — 3074F SYST BP LT 130 MM HG: CPT | Performed by: FAMILY MEDICINE

## 2023-10-24 PROCEDURE — 3051F HG A1C>EQUAL 7.0%<8.0%: CPT | Performed by: FAMILY MEDICINE

## 2023-10-24 PROCEDURE — 3078F DIAST BP <80 MM HG: CPT | Performed by: FAMILY MEDICINE

## 2023-10-24 PROCEDURE — 99214 OFFICE O/P EST MOD 30 MIN: CPT | Performed by: FAMILY MEDICINE

## 2023-10-24 RX ORDER — TRIAMCINOLONE ACETONIDE 1 MG/G
0.1 OINTMENT TOPICAL 2 TIMES DAILY
COMMUNITY

## 2023-10-24 RX ORDER — HYDROCHLOROTHIAZIDE 25 MG/1
25 TABLET ORAL DAILY
Qty: 90 TABLET | Refills: 3 | Status: SHIPPED | OUTPATIENT
Start: 2023-10-24

## 2023-10-24 RX ORDER — AMLODIPINE BESYLATE 5 MG/1
5 TABLET ORAL DAILY
Qty: 90 TABLET | Refills: 3 | Status: SHIPPED | OUTPATIENT
Start: 2023-10-24 | End: 2024-10-23

## 2023-10-24 ASSESSMENT — ENCOUNTER SYMPTOMS
NAUSEA: 1
DIARRHEA: 0
VOMITING: 0
COUGH: 0
SHORTNESS OF BREATH: 0
HEMOPTYSIS: 0
ABDOMINAL PAIN: 0
CONSTIPATION: 0

## 2023-11-13 ENCOUNTER — CLINICAL DOCUMENTATION (OUTPATIENT)
Age: 63
End: 2023-11-13

## 2023-12-22 ENCOUNTER — HOSPITAL ENCOUNTER (OUTPATIENT)
Facility: HOSPITAL | Age: 63
Discharge: HOME | End: 2023-12-25
Payer: COMMERCIAL

## 2023-12-22 DIAGNOSIS — E11.51 TYPE 2 DIABETES MELLITUS WITH DIABETIC PERIPHERAL ANGIOPATHY WITHOUT GANGRENE, WITHOUT LONG-TERM CURRENT USE OF INSULIN (HCC): ICD-10-CM

## 2023-12-22 LAB
CHOLEST SERPL-MCNC: 188 MG/DL
EST. AVERAGE GLUCOSE BLD GHB EST-MCNC: 123 MG/DL
HBA1C MFR BLD: 5.9 % (ref 4.2–5.6)
HDLC SERPL-MCNC: 50 MG/DL (ref 40–60)
HDLC SERPL: 3.8 (ref 0–5)
LDLC SERPL CALC-MCNC: 105.4 MG/DL (ref 0–100)
LIPID PANEL: ABNORMAL
TRIGL SERPL-MCNC: 163 MG/DL
VLDLC SERPL CALC-MCNC: 32.6 MG/DL

## 2023-12-22 PROCEDURE — 80061 LIPID PANEL: CPT

## 2023-12-22 PROCEDURE — 36415 COLL VENOUS BLD VENIPUNCTURE: CPT

## 2023-12-22 PROCEDURE — 83036 HEMOGLOBIN GLYCOSYLATED A1C: CPT

## 2024-01-05 ENCOUNTER — TELEPHONE (OUTPATIENT)
Dept: PHARMACY | Facility: CLINIC | Age: 64
End: 2024-01-05

## 2024-01-05 NOTE — TELEPHONE ENCOUNTER
**Patient is [facility]**   2024 Annual Pharmacist Visit    Patient returned call and made an appt on   Friday Jan 12, 2024   Appt at 8:00 AM (30 min)       Vidhya Amaya [facility]  Clinical Pharmacy   Department, toll free: [phone] Option #3    For Pharmacy Admin Tracking Only    Program: [employer]  CPA in place:  No  Recommendation Provided To: Patient/Caregiver: 1 via Telephone  Intervention Detail: Scheduled Appointment  Intervention Accepted By: Patient/Caregiver: 1  Gap Closed?: Yes   Time Spent (min): 5

## 2024-01-05 NOTE — TELEPHONE ENCOUNTER
**Patient is [facility]**    Called patient to schedule 2024 yearly pharmacist appointment to discuss medications for Diabetes Management Program.    No answer. Left VM on mobile TAD  Please call back at [phone] Option #3.    Rachel Prieto CphT    [facility]    Clinical Pharmacy     Department, toll free: [phone] Option #3

## 2024-01-12 ENCOUNTER — TELEPHONE (OUTPATIENT)
Dept: PHARMACY | Facility: CLINIC | Age: 64
End: 2024-01-12

## 2024-01-12 RX ORDER — BLOOD-GLUCOSE METER
EACH MISCELLANEOUS
Qty: 1 KIT | Refills: 0 | Status: SHIPPED | OUTPATIENT
Start: 2024-01-12

## 2024-01-12 RX ORDER — BLOOD SUGAR DIAGNOSTIC
STRIP MISCELLANEOUS
Qty: 100 EACH | Refills: 3 | Status: SHIPPED | OUTPATIENT
Start: 2024-01-12

## 2024-01-12 RX ORDER — BLOOD-GLUCOSE CONTROL, LOW
EACH MISCELLANEOUS
Qty: 100 EACH | Refills: 3 | Status: SHIPPED | OUTPATIENT
Start: 2024-01-12

## 2024-01-12 NOTE — TELEPHONE ENCOUNTER
[facility] CLINICAL PHARMACY REVIEW - Be Well with Diabetes ([facility])    Henrik Muir Jr. is a 63 y.o. male enrolled in the [facility] Employee Diabetes Program. Patient provided writer with verbal consent to remain in the program for this year. Patient enrolled 2020.    Medications:  Current Outpatient Medications   Medication Instructions    amLODIPine (NORVASC) 5 mg, Oral, DAILY    aspirin 81 mg, Oral, DAILY    hydroCHLOROthiazide (HYDRODIURIL) 25 mg, Oral, DAILY    losartan (COZAAR) 25 MG tablet TAKE 1 TABLET BY MOUTH ONCE DAILY    montelukast (SINGULAIR) 10 mg, DAILY    naproxen (NAPROSYN) 375 MG tablet TAKE 1 TABLET BY MOUTH 2 TIMES A DAY WITH MEALS    Ozempic (2 MG/DOSE) 2 mg, SubCUTAneous, EVERY 7 DAYS    rosuvastatin (CRESTOR) 20 mg, Oral, DAILY    terbinafine (LAMISIL) 250 MG tablet TAKE 1 TABLET BY MOUTH ONE TIME A DAY    triamcinolone (KENALOG) 0.1 g, Topical, 2 TIMES DAILY, Apply topically as needed for itching      Current Pharmacy: [facility]  Current testing supplies: [facility]  Pen needles/syringes: na  Insulin Pump system (if applicable): na      Allergies:  No Known Allergies     Vitals/Labs:  BP Readings from Last 3 Encounters:   10/24/23 128/80   09/05/23 (!) 166/91   05/09/23 129/75     No results found for: \"VDBL42ESZ\"   Lab Results   Component Value Date    MALBCR 4 01/24/2023     Lab Results   Component Value Date    LABA1C 5.9 (H) 12/22/2023    LABA1C 7.4 (H) 04/21/2023    LABA1C 7.4 (H) 01/24/2023     Hemoglobin A1C, POC   Date Value Ref Range Status   06/06/2019 7.7 (A) 4.2 - 5.8 % Final     Lab Results   Component Value Date    CHOL 188 12/22/2023    TRIG 163 (H) 12/22/2023    HDL 50 12/22/2023    LDLCALC 105.4 (H) 12/22/2023     ALT   Date Value Ref Range Status   01/24/2023 14 0 - 44 IU/L Final     AST   Date Value Ref Range Status   01/24/2023 16 0 - 40 IU/L Final     The 10-year ASCVD risk score (Lacey LAWSON, et al., 2019) is: 20.5%

## 2024-01-12 NOTE — TELEPHONE ENCOUNTER
Dominic Clifford MD    Your patient has requested a less expensive meter system. The [employer] system is currently the only meter sytem that is a Tier 0 ($0 Copay) for meter, strips and lancets with the patient's insurance plan. I have pended prescriptions for your approval.     Thank you,  oHa Bradley, PharmD, [facility]  [facility] Clinical Pharmacist  Department: [phone]

## 2024-01-24 LAB
CREATININE, URINE: 285.2
ESTIMATED AVERAGE GLUCOSE: NORMAL
HBA1C MFR BLD: 7.4 %
MICROALBUMIN/CREAT 24H UR: 12.7 MG/G{CREAT}
MICROALBUMIN/CREAT UR-RTO: 4

## 2024-01-30 ENCOUNTER — TELEPHONE (OUTPATIENT)
Age: 64
End: 2024-01-30

## 2024-01-30 NOTE — TELEPHONE ENCOUNTER
Called pt d/t Dr. Campos had sent a results letter to pt and wanted to see if pt wanted to order titration study.  Per pt,  he would like to hold off on scheduling anything right now.  He will call back if he decides to go forward with any testing.

## 2024-01-30 NOTE — TELEPHONE ENCOUNTER
----- Message from Blanca Giron DO sent at 1/29/2024  4:35 PM EST -----  Regarding: patient that didn't respond, needs titration

## 2024-02-04 ENCOUNTER — HOSPITAL ENCOUNTER (EMERGENCY)
Facility: HOSPITAL | Age: 64
Discharge: HOME | End: 2024-02-04
Attending: EMERGENCY MEDICINE

## 2024-02-04 VITALS
BODY MASS INDEX: 40.95 KG/M2 | RESPIRATION RATE: 17 BRPM | DIASTOLIC BLOOD PRESSURE: 78 MMHG | HEIGHT: 73 IN | HEART RATE: 78 BPM | SYSTOLIC BLOOD PRESSURE: 160 MMHG | WEIGHT: 309 LBS | OXYGEN SATURATION: 96 % | TEMPERATURE: 97.8 F

## 2024-02-04 DIAGNOSIS — S05.01XA INJURY OF CONJUNCTIVA AND CORNEAL ABRASION OF RIGHT EYE WITHOUT FOREIGN BODY, INITIAL ENCOUNTER: Primary | ICD-10-CM

## 2024-02-04 PROCEDURE — 99283 EMERGENCY DEPT VISIT LOW MDM: CPT

## 2024-02-04 PROCEDURE — 2500000003 HC RX 250 WO HCPCS: Performed by: EMERGENCY MEDICINE

## 2024-02-04 PROCEDURE — 6370000000 HC RX 637 (ALT 250 FOR IP): Performed by: EMERGENCY MEDICINE

## 2024-02-04 RX ORDER — ERYTHROMYCIN 5 MG/G
OINTMENT OPHTHALMIC
Status: COMPLETED | OUTPATIENT
Start: 2024-02-04 | End: 2024-02-04

## 2024-02-04 RX ORDER — PROPARACAINE HYDROCHLORIDE 5 MG/ML
1 SOLUTION/ DROPS OPHTHALMIC
Status: COMPLETED | OUTPATIENT
Start: 2024-02-04 | End: 2024-02-04

## 2024-02-04 RX ORDER — ERYTHROMYCIN 5 MG/G
OINTMENT OPHTHALMIC
Qty: 3.5 G | Refills: 0 | Status: SHIPPED | OUTPATIENT
Start: 2024-02-04 | End: 2024-02-14

## 2024-02-04 RX ADMIN — PROPARACAINE HYDROCHLORIDE 1 DROP: 5 SOLUTION/ DROPS OPHTHALMIC at 11:51

## 2024-02-04 RX ADMIN — FLUORESCEIN SODIUM 1 MG: 1 STRIP OPHTHALMIC at 11:51

## 2024-02-04 RX ADMIN — ERYTHROMYCIN: 5 OINTMENT OPHTHALMIC at 12:05

## 2024-02-04 ASSESSMENT — VISUAL ACUITY
OU: 20/20
OS: 20/15
OD: 20/25

## 2024-02-04 NOTE — ED TRIAGE NOTES
Ambulatory to B with c/o severe pain to right eye since 0500 \"feels like something in my eye\".  
no
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
## 2024-02-04 NOTE — DISCHARGE INSTRUCTIONS
Use erythromycin ointment, 1/4 inch to the lower lid of the right eye 3 times daily.  Take Tylenol for your discomfort.  You may use the ibuprofen occasionally as well.  Follow-up with either primary care or the eye specialist of your choice if not improved in 2 days.  Return to the emergency department for any acute concerns

## 2024-03-25 ENCOUNTER — CLINICAL DOCUMENTATION (OUTPATIENT)
Dept: PHARMACY | Facility: CLINIC | Age: 64
End: 2024-03-25

## 2024-03-25 NOTE — PROGRESS NOTES
1st Quarterly Reminder sent to patient for the DM Program - See Mychart message or Letter for more information.      Wiliam Tobar [facility]  Clinical Pharmacy   Phone: [phone], Option #3       For Pharmacy Admin Tracking Only    Program: [employer]  CPA in place:  No  Gap Closed?: Yes   Time Spent (min): 5

## 2024-04-09 ENCOUNTER — OFFICE VISIT (OUTPATIENT)
Age: 64
End: 2024-04-09
Payer: COMMERCIAL

## 2024-04-09 VITALS
BODY MASS INDEX: 42.66 KG/M2 | OXYGEN SATURATION: 97 % | HEART RATE: 92 BPM | HEIGHT: 72 IN | WEIGHT: 315 LBS | DIASTOLIC BLOOD PRESSURE: 86 MMHG | SYSTOLIC BLOOD PRESSURE: 148 MMHG

## 2024-04-09 DIAGNOSIS — I10 PRIMARY HYPERTENSION: Primary | ICD-10-CM

## 2024-04-09 PROCEDURE — 3079F DIAST BP 80-89 MM HG: CPT | Performed by: INTERNAL MEDICINE

## 2024-04-09 PROCEDURE — 1036F TOBACCO NON-USER: CPT | Performed by: INTERNAL MEDICINE

## 2024-04-09 PROCEDURE — 3017F COLORECTAL CA SCREEN DOC REV: CPT | Performed by: INTERNAL MEDICINE

## 2024-04-09 PROCEDURE — G8427 DOCREV CUR MEDS BY ELIG CLIN: HCPCS | Performed by: INTERNAL MEDICINE

## 2024-04-09 PROCEDURE — 93000 ELECTROCARDIOGRAM COMPLETE: CPT | Performed by: INTERNAL MEDICINE

## 2024-04-09 PROCEDURE — 3077F SYST BP >= 140 MM HG: CPT | Performed by: INTERNAL MEDICINE

## 2024-04-09 PROCEDURE — 99214 OFFICE O/P EST MOD 30 MIN: CPT | Performed by: INTERNAL MEDICINE

## 2024-04-09 PROCEDURE — G8417 CALC BMI ABV UP PARAM F/U: HCPCS | Performed by: INTERNAL MEDICINE

## 2024-04-09 ASSESSMENT — PATIENT HEALTH QUESTIONNAIRE - PHQ9
SUM OF ALL RESPONSES TO PHQ QUESTIONS 1-9: 0
SUM OF ALL RESPONSES TO PHQ QUESTIONS 1-9: 0
1. LITTLE INTEREST OR PLEASURE IN DOING THINGS: NOT AT ALL
SUM OF ALL RESPONSES TO PHQ QUESTIONS 1-9: 0
SUM OF ALL RESPONSES TO PHQ9 QUESTIONS 1 & 2: 0
2. FEELING DOWN, DEPRESSED OR HOPELESS: NOT AT ALL
SUM OF ALL RESPONSES TO PHQ QUESTIONS 1-9: 0

## 2024-04-09 NOTE — PROGRESS NOTES
Identified pt with two pt identifiers(name and ). Reviewed record in preparation for visit and have obtained necessary documentation.    Henrik Muir Jr. presents today for   Chief Complaint   Patient presents with    Follow-up     5 month post nuc       Pt c/o DIZZINESS, SOB, CHEST PAIN/ PRESSURE, FATIGUE/WEAKNESS, HEADACHES, SWELLING.             Henrik Muir Jr. preferred language for health care discussion is english/other.    Personal Protective Equipment:   Personal Protective Equipment was used including: mask-surgical and hands-gloves. Patient was placed on no precaution(s). Patient was masked.    Precautions:   Patient currently on None  Patient currently roomed with door closed.    Is someone accompanying this pt? no    Is the patient using any DME equipment during OV? no    Depression Screenin/9/2024     1:02 PM 2023     2:54 PM 2023     9:13 AM 2023     8:55 AM 2022    10:31 AM 2022    10:14 AM   PHQ-9 Questionaire   Little interest or pleasure in doing things 0 0 0 0 0 0   Feeling down, depressed, or hopeless 0 0 0 0 0 0   PHQ-9 Total Score 0 0 0 0 0 0        Learning Assessment:  No question data found.    Abuse Screenin/24/2023     9:00 AM 2023     3:00 PM   AMB Abuse Screening   Do you ever feel afraid of your partner? N N   Are you in a relationship with someone who physically or mentally threatens you? N N   Is it safe for you to go home? Y Y          Fall Risk      2023     9:07 AM 2023     3:02 PM   Fall Risk   2 or more falls in past year? no no   Fall with injury in past year? no no         Pt currently taking Anticoagulant /Antiplatelet therapy? yes    Coordination of Care:  1. Have you been to the ER, urgent care clinic since your last visit? Hospitalized since your last visit? Yes- scratch eye right    2. Have you seen or consulted any other health care providers outside of the [facility] since your last 
Unfortunately patient has gained weight back.  No symptoms at this time  Continue aggressive lifestyle modification.  Finding discussed with the patient and reassured    Hypertension: /86.  Currently on amlodipine, losartan and hydrochlorothiazide..  /89.  Salt restriction, dietary potassium, weight loss and exercise discussed again during this visit    Sleep apnea:Using CPAP machine.    Obesity: Current weight 346 pounds with BMI of 45.  He was able to lose almost 50 pounds however has gained all his weight back with unhealthy lifestyle.  He is going to change his lifestyle again  Importance of diet and exercise was discussed with patient.    This plan was discussed with patient who is in agreement.    Thank you for allowing me to participate in patient care. Please feel free to call me if you have any question or concern.     Bernard Mcmahon MD  Please note: This document has been produced using voice recognition software. Unrecognized errors in transcription may be present.

## 2024-04-24 ENCOUNTER — OFFICE VISIT (OUTPATIENT)
Facility: CLINIC | Age: 64
End: 2024-04-24

## 2024-04-24 ENCOUNTER — HOSPITAL ENCOUNTER (OUTPATIENT)
Facility: HOSPITAL | Age: 64
Setting detail: SPECIMEN
Discharge: HOME | End: 2024-04-27

## 2024-04-24 VITALS
OXYGEN SATURATION: 94 % | BODY MASS INDEX: 41.75 KG/M2 | DIASTOLIC BLOOD PRESSURE: 80 MMHG | WEIGHT: 315 LBS | TEMPERATURE: 97.6 F | RESPIRATION RATE: 20 BRPM | HEIGHT: 73 IN | SYSTOLIC BLOOD PRESSURE: 123 MMHG | HEART RATE: 80 BPM

## 2024-04-24 DIAGNOSIS — S80.861A TICK BITE OF RIGHT LOWER LEG, INITIAL ENCOUNTER: ICD-10-CM

## 2024-04-24 DIAGNOSIS — E78.00 HYPERCHOLESTEROLEMIA: ICD-10-CM

## 2024-04-24 DIAGNOSIS — L20.89 OTHER ATOPIC DERMATITIS: ICD-10-CM

## 2024-04-24 DIAGNOSIS — E66.01 MORBID (SEVERE) OBESITY DUE TO EXCESS CALORIES (HCC): ICD-10-CM
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
## 2024-04-24 DIAGNOSIS — Z00.00 ANNUAL PHYSICAL EXAM: Primary | ICD-10-CM

## 2024-04-24 DIAGNOSIS — I10 ESSENTIAL (PRIMARY) HYPERTENSION: ICD-10-CM

## 2024-04-24 DIAGNOSIS — E11.51 TYPE 2 DIABETES MELLITUS WITH DIABETIC PERIPHERAL ANGIOPATHY WITHOUT GANGRENE, WITHOUT LONG-TERM CURRENT USE OF INSULIN (HCC): ICD-10-CM

## 2024-04-24 DIAGNOSIS — K59.01 SLOW TRANSIT CONSTIPATION: ICD-10-CM

## 2024-04-24 LAB
ALBUMIN SERPL-MCNC: 3.9 G/DL (ref 3.4–5)
ALBUMIN/GLOB SERPL: 1.4 (ref 0.8–1.7)
ALP SERPL-CCNC: 82 U/L (ref 45–117)
ALT SERPL-CCNC: 24 U/L (ref 16–61)
ANION GAP SERPL CALC-SCNC: 2 MMOL/L (ref 3–18)
AST SERPL-CCNC: 23 U/L (ref 10–38)
BILIRUB SERPL-MCNC: 0.9 MG/DL (ref 0.2–1)
BUN SERPL-MCNC: 17 MG/DL (ref 7–18)
BUN/CREAT SERPL: 20 (ref 12–20)
CALCIUM SERPL-MCNC: 8.8 MG/DL (ref 8.5–10.1)
CHLORIDE SERPL-SCNC: 103 MMOL/L (ref 100–111)
CO2 SERPL-SCNC: 33 MMOL/L (ref 21–32)
CREAT SERPL-MCNC: 0.84 MG/DL (ref 0.6–1.3)
GLOBULIN SER CALC-MCNC: 2.8 G/DL (ref 2–4)
GLUCOSE SERPL-MCNC: 107 MG/DL (ref 74–99)
POTASSIUM SERPL-SCNC: 3.9 MMOL/L (ref 3.5–5.5)
PROT SERPL-MCNC: 6.7 G/DL (ref 6.4–8.2)
SODIUM SERPL-SCNC: 138 MMOL/L (ref 136–145)

## 2024-04-24 PROCEDURE — 80053 COMPREHEN METABOLIC PANEL: CPT

## 2024-04-24 PROCEDURE — 36415 COLL VENOUS BLD VENIPUNCTURE: CPT

## 2024-04-24 RX ORDER — TRIAMCINOLONE ACETONIDE 0.25 MG/G
CREAM TOPICAL
Qty: 80 G | Refills: 0 | Status: SHIPPED | OUTPATIENT
Start: 2024-04-24

## 2024-04-24 RX ORDER — DOXYCYCLINE HYCLATE 100 MG
100 TABLET ORAL 2 TIMES DAILY
Qty: 20 TABLET | Refills: 0 | Status: SHIPPED | OUTPATIENT
Start: 2024-04-24 | End: 2024-05-04

## 2024-04-24 RX ORDER — SEMAGLUTIDE 2.68 MG/ML
2 INJECTION, SOLUTION SUBCUTANEOUS
Qty: 9 ML | Refills: 4 | Status: SHIPPED | OUTPATIENT
Start: 2024-04-24

## 2024-04-24 RX ORDER — CETIRIZINE HYDROCHLORIDE 10 MG/1
10 TABLET ORAL DAILY
Qty: 90 TABLET | Refills: 3 | Status: SHIPPED | OUTPATIENT
Start: 2024-04-24

## 2024-04-24 SDOH — ECONOMIC STABILITY: FOOD INSECURITY: WITHIN THE PAST 12 MONTHS, YOU WORRIED THAT YOUR FOOD WOULD RUN OUT BEFORE YOU GOT MONEY TO BUY MORE.: NEVER TRUE

## 2024-04-24 SDOH — ECONOMIC STABILITY: FOOD INSECURITY: WITHIN THE PAST 12 MONTHS, THE FOOD YOU BOUGHT JUST DIDN'T LAST AND YOU DIDN'T HAVE MONEY TO GET MORE.: NEVER TRUE

## 2024-04-24 SDOH — ECONOMIC STABILITY: INCOME INSECURITY: HOW HARD IS IT FOR YOU TO PAY FOR THE VERY BASICS LIKE FOOD, HOUSING, MEDICAL CARE, AND HEATING?: NOT HARD AT ALL

## 2024-04-24 ASSESSMENT — PATIENT HEALTH QUESTIONNAIRE - PHQ9
SUM OF ALL RESPONSES TO PHQ QUESTIONS 1-9: 0
SUM OF ALL RESPONSES TO PHQ QUESTIONS 1-9: 0
SUM OF ALL RESPONSES TO PHQ9 QUESTIONS 1 & 2: 0
SUM OF ALL RESPONSES TO PHQ QUESTIONS 1-9: 0
SUM OF ALL RESPONSES TO PHQ QUESTIONS 1-9: 0
2. FEELING DOWN, DEPRESSED OR HOPELESS: NOT AT ALL
1. LITTLE INTEREST OR PLEASURE IN DOING THINGS: NOT AT ALL

## 2024-04-24 NOTE — PROGRESS NOTES
patients aged 40-74: Has the patient had a mammogram within the past 2 years? NA - based on age or sex    5. For patients aged 21-65: Has the patient had a pap smear? NA - based on age or sex    Advanced Directive:  1. Do you have an Advanced Directive? No     2. Would you like information on Advanced Directives? Yes

## 2024-05-30 DIAGNOSIS — S39.012A BACK STRAIN, INITIAL ENCOUNTER: Primary | ICD-10-CM

## 2024-05-30 RX ORDER — CYCLOBENZAPRINE HCL 10 MG
10 TABLET ORAL 3 TIMES DAILY PRN
Qty: 30 TABLET | Refills: 0 | Status: SHIPPED | OUTPATIENT
Start: 2024-05-30 | End: 2024-06-09

## 2024-07-03 ENCOUNTER — CLINICAL DOCUMENTATION (OUTPATIENT)
Dept: PHARMACY | Facility: CLINIC | Age: 64
End: 2024-07-03

## 2024-07-03 NOTE — PROGRESS NOTES
2nd Quarterly Reminder sent to patient for the DM Program - See Mychart message or Letter for more information.      Wiliam Tobar [facility]  Clinical Pharmacy   Phone: [phone], Option #3      For Pharmacy Admin Tracking Only    Program: [employer]  CPA in place:  No  Gap Closed?: Yes   Time Spent (min): 5

## 2024-07-31 ENCOUNTER — OFFICE VISIT (OUTPATIENT)
Facility: CLINIC | Age: 64
End: 2024-07-31
Payer: COMMERCIAL

## 2024-07-31 ENCOUNTER — HOSPITAL ENCOUNTER (OUTPATIENT)
Facility: HOSPITAL | Age: 64
Setting detail: SPECIMEN
Discharge: HOME | End: 2024-08-03
Payer: COMMERCIAL

## 2024-07-31 VITALS
DIASTOLIC BLOOD PRESSURE: 88 MMHG | RESPIRATION RATE: 17 BRPM | SYSTOLIC BLOOD PRESSURE: 132 MMHG | HEIGHT: 73 IN | OXYGEN SATURATION: 95 % | TEMPERATURE: 97.3 F | HEART RATE: 75 BPM | WEIGHT: 315 LBS | BODY MASS INDEX: 41.75 KG/M2

## 2024-07-31 DIAGNOSIS — E66.01 MORBID (SEVERE) OBESITY DUE TO EXCESS CALORIES (HCC): ICD-10-CM

## 2024-07-31 DIAGNOSIS — E11.51 TYPE 2 DIABETES MELLITUS WITH DIABETIC PERIPHERAL ANGIOPATHY WITHOUT GANGRENE, WITHOUT LONG-TERM CURRENT USE OF INSULIN (HCC): ICD-10-CM

## 2024-07-31 DIAGNOSIS — J06.9 URI WITH COUGH AND CONGESTION: Primary | ICD-10-CM

## 2024-07-31 DIAGNOSIS — I10 ESSENTIAL (PRIMARY) HYPERTENSION: ICD-10-CM

## 2024-07-31 LAB
EST. AVERAGE GLUCOSE BLD GHB EST-MCNC: 134 MG/DL
HBA1C MFR BLD: 6.3 % (ref 4.2–5.6)

## 2024-07-31 PROCEDURE — 3017F COLORECTAL CA SCREEN DOC REV: CPT | Performed by: FAMILY MEDICINE

## 2024-07-31 PROCEDURE — 2022F DILAT RTA XM EVC RTNOPTHY: CPT | Performed by: FAMILY MEDICINE

## 2024-07-31 PROCEDURE — 99214 OFFICE O/P EST MOD 30 MIN: CPT | Performed by: FAMILY MEDICINE

## 2024-07-31 PROCEDURE — 3079F DIAST BP 80-89 MM HG: CPT | Performed by: FAMILY MEDICINE

## 2024-07-31 PROCEDURE — G8427 DOCREV CUR MEDS BY ELIG CLIN: HCPCS | Performed by: FAMILY MEDICINE

## 2024-07-31 PROCEDURE — 36415 COLL VENOUS BLD VENIPUNCTURE: CPT

## 2024-07-31 PROCEDURE — 3044F HG A1C LEVEL LT 7.0%: CPT | Performed by: FAMILY MEDICINE

## 2024-07-31 PROCEDURE — 3075F SYST BP GE 130 - 139MM HG: CPT | Performed by: FAMILY MEDICINE

## 2024-07-31 PROCEDURE — 1036F TOBACCO NON-USER: CPT | Performed by: FAMILY MEDICINE

## 2024-07-31 PROCEDURE — G8417 CALC BMI ABV UP PARAM F/U: HCPCS | Performed by: FAMILY MEDICINE

## 2024-07-31 PROCEDURE — 83036 HEMOGLOBIN GLYCOSYLATED A1C: CPT

## 2024-07-31 RX ORDER — FLUTICASONE PROPIONATE 50 MCG
1 SPRAY, SUSPENSION (ML) NASAL DAILY
Qty: 16 G | Refills: 2 | Status: SHIPPED | OUTPATIENT
Start: 2024-07-31

## 2024-07-31 RX ORDER — MONTELUKAST SODIUM 10 MG/1
10 TABLET ORAL DAILY
Qty: 90 TABLET | Refills: 3 | Status: SHIPPED | OUTPATIENT
Start: 2024-07-31

## 2024-07-31 RX ORDER — LOSARTAN POTASSIUM 25 MG/1
25 TABLET ORAL DAILY
Qty: 90 TABLET | Refills: 3 | Status: SHIPPED | OUTPATIENT
Start: 2024-07-31

## 2024-07-31 SDOH — ECONOMIC STABILITY: FOOD INSECURITY: WITHIN THE PAST 12 MONTHS, YOU WORRIED THAT YOUR FOOD WOULD RUN OUT BEFORE YOU GOT MONEY TO BUY MORE.: NEVER TRUE

## 2024-07-31 SDOH — ECONOMIC STABILITY: INCOME INSECURITY: HOW HARD IS IT FOR YOU TO PAY FOR THE VERY BASICS LIKE FOOD, HOUSING, MEDICAL CARE, AND HEATING?: NOT HARD AT ALL

## 2024-07-31 SDOH — ECONOMIC STABILITY: FOOD INSECURITY: WITHIN THE PAST 12 MONTHS, THE FOOD YOU BOUGHT JUST DIDN'T LAST AND YOU DIDN'T HAVE MONEY TO GET MORE.: NEVER TRUE

## 2024-07-31 ASSESSMENT — PATIENT HEALTH QUESTIONNAIRE - PHQ9
SUM OF ALL RESPONSES TO PHQ QUESTIONS 1-9: 0
SUM OF ALL RESPONSES TO PHQ9 QUESTIONS 1 & 2: 0
SUM OF ALL RESPONSES TO PHQ QUESTIONS 1-9: 0
SUM OF ALL RESPONSES TO PHQ QUESTIONS 1-9: 0
2. FEELING DOWN, DEPRESSED OR HOPELESS: NOT AT ALL
SUM OF ALL RESPONSES TO PHQ QUESTIONS 1-9: 0
1. LITTLE INTEREST OR PLEASURE IN DOING THINGS: NOT AT ALL

## 2024-07-31 NOTE — PROGRESS NOTES
Henrik Muir Jr. is a 64 y.o.  male and presents with    Chief Complaint   Patient presents with    Congestion    Diabetes    Obesity     Subjective:      Henrik Muir, a 65 yo male, presents to the clinic for a follow up appointment. He has no complaints. Upon review of system, he states that he has a head cold that began last Wednesday. He denies any fever or contact with sick persons. He denies any chest pain, shortness of breath, abdominal pain, weight changes, rashes or skin changes, but has chronic constipation. He quit smoking 38 years ago and denies any alcohol use or new sexual partners.    Cardiovascular Review:  The patient has diabetes, hypertension, hyperlipidemia, coronary artery disease and obesity.  Diet and Lifestyle: generally follows a low fat low cholesterol diet, generally follows a low sodium diet, follows a diabetic diet regularly, exercises regularly, nonsmoker  Home BP Monitoring: is well controlled at home, ranging 130's/85's.  Pertinent ROS: taking medications as instructed, no medication side effects noted, no TIA's, no chest pain on exertion, no dyspnea on exertion, no swelling of ankles.   Diabetes Mellitus:  He has diabetes mellitus.  Diabetic ROS - medication compliance: compliant all of the time, diabetic diet compliance: compliant most of the time, home glucose monitoring: is performed regularly.  He reports that he has average 130  Lab review: labs reviewed, I note that glycosylated hemoglobin mildly abnormal but acceptable.      ROS   Constitutional: Negative for appetite change, chills, diaphoresis, fatigue and fever.  HENT: Negative for congestion and sore throat.    Respiratory: Positive for cough with yellow phlegm for several months; increase dyspnea with exertion  Cardiovascular: Negative for chest pain and palpitations.  Gastrointestinal: Negative for nausea and vomiting; + constipation with every other day hard stool  Genitourinary: Negative for 
patient had a mammogram within the past 2 years? NA - based on age or sex    5. For patients aged 21-65: Has the patient had a pap smear? NA - based on age or sex    Advanced Directive:  1. Do you have an Advanced Directive? No     2. Would you like information on Advanced Directives? No

## 2024-08-16 LAB
CHOLEST SERPL-MCNC: 120 MG/DL
EST. AVERAGE GLUCOSE BLD GHB EST-MCNC: 140 MG/DL
GLUCOSE SERPL-MCNC: 104 MG/DL (ref 74–99)
HBA1C MFR BLD: 6.5 % (ref 4.2–5.6)
HDLC SERPL-MCNC: 44 MG/DL (ref 40–60)
LDLC SERPL CALC-MCNC: 56.8 MG/DL (ref 0–100)
TRIGL SERPL-MCNC: 96 MG/DL

## 2024-09-19 RX ORDER — TERBINAFINE HYDROCHLORIDE 250 MG/1
250 TABLET ORAL DAILY
Qty: 30 TABLET | Refills: 2 | Status: SHIPPED | OUTPATIENT
Start: 2024-09-19

## 2024-09-19 RX ORDER — ASPIRIN 81 MG/1
81 TABLET, CHEWABLE ORAL DAILY
Qty: 90 TABLET | Refills: 3 | Status: SHIPPED | OUTPATIENT
Start: 2024-09-19

## 2024-10-03 ENCOUNTER — TELEPHONE (OUTPATIENT)
Dept: PHARMACY | Facility: CLINIC | Age: 64
End: 2024-10-03

## 2024-12-27 NOTE — PROGRESS NOTES
Medication requested :   Requested Prescriptions     Pending Prescriptions Disp Refills    naproxen (NAPROSYN) 375 MG tablet 60 tablet 1     Sig: Take 1 tablet by mouth 2 times daily (with meals)      PCP: Dominic Clifford MD  LOV:           7/31/2024   NOV DMA: 2/5/2025  FUTURE APPT:   Future Appointments   Date Time Provider Department Center   1/16/2025  1:00 PM Bernard Mcmahon MD [facility]   2/5/2025  9:00 AM Dominic Clifford MD [facility] DEP       Thank you.

## 2024-12-30 ENCOUNTER — TELEPHONE (OUTPATIENT)
Dept: PHARMACY | Facility: CLINIC | Age: 64
End: 2024-12-30

## 2024-12-30 NOTE — TELEPHONE ENCOUNTER
**Patient is [facility]**     2025 Annual Pharmacist Visit    Called patient to schedule 2025 yearly pharmacist appointment to discuss medications for Diabetes Management Program.    No answer. Left VM.     Please call back at [phone], option #3         [facility]  Clinical Pharmacy   Phone: [phone], option #3

## 2024-12-31 NOTE — TELEPHONE ENCOUNTER
Patient called back and scheduled appointment for 1/13/25 at 12:00pm.       [facility]  Clinical Pharmacy   Phone: [phone], Option #3     For Pharmacy Admin Tracking Only    Program: [employer]  CPA in place:  No  Recommendation Provided To: Patient/Caregiver: 1 via Telephone  Intervention Detail: Scheduled Appointment  Intervention Accepted By: Patient/Caregiver: 1  Gap Closed?: Yes   Time Spent (min): 5

## 2025-01-13 ENCOUNTER — TELEPHONE (OUTPATIENT)
Dept: PHARMACY | Facility: CLINIC | Age: 65
End: 2025-01-13

## 2025-01-13 NOTE — TELEPHONE ENCOUNTER
POPULATION HEALTH CLINICAL PHARMACY REVIEW - Be Well with Diabetes ([facility])    Henrik Muir Jr. is a 64 y.o. male enrolled in the [facility] Employee Diabetes Program. Patient provided writer with verbal consent to remain in the program for this year. Patient enrolled 2020    Communication preferences (for >8% followup, urgent messages, etc)  Preferred methods: Phone, does not always check MyChart  Preferred days/time: anytime    Medications:  Current Outpatient Medications   Medication Instructions    amLODIPine (NORVASC) 5 mg, Oral, DAILY    aspirin 81 mg, Oral, DAILY    Blood Glucose Monitoring Suppl (PRODIGY AUTOCODE BLOOD GLUCOSE) w/Device KIT Use as directed    blood glucose test strips (PRODIGY NO CODING BLOOD GLUC) strip Use to test sugar daily or as directed    cetirizine (ZYRTEC) 10 mg, Oral, DAILY    fluticasone (FLONASE) 50 MCG/ACT nasal spray 1 spray, Each Nostril, DAILY    hydroCHLOROthiazide (HYDRODIURIL) 25 mg, Oral, DAILY    losartan (COZAAR) 25 mg, Oral, DAILY    metFORMIN (GLUCOPHAGE) 1,000 mg, Oral, 2 TIMES DAILY WITH MEALS  - no fills in 2024  - nausea in past with metformin, see 10/24/23 PCP OV  - says not supposed to take but does occasionally take  - came off once A1c improved, but noticed blood sugar increased a little so restarted  - now taking 1 tab daily  - will need refilled, has upcoming OV with PCP    montelukast (SINGULAIR) 10 mg, Oral, DAILY    naproxen (NAPROSYN) 375 mg, Oral, 2 TIMES DAILY WITH MEALS    Ozempic (2 MG/DOSE) 2 mg, SubCUTAneous, EVERY 7 DAYS    Prodigy Lancets 28G MISC Use to test sugar daily or as directed    rosuvastatin (CRESTOR) 20 mg, Oral, DAILY    terbinafine (LAMISIL) 250 mg, Oral, DAILY    triamcinolone (KENALOG) 0.025 % cream Apply topically 2 times daily.     OTC for headache - ES APAP  Naproxen for hand arthritis    Pharmacy and Supplies Information  Current Pharmacy: [facility]  Current Testing supplies:Prodigy, Rxs now

## 2025-01-24 DIAGNOSIS — I10 ESSENTIAL (PRIMARY) HYPERTENSION: ICD-10-CM

## 2025-01-24 DIAGNOSIS — E11.51 TYPE 2 DIABETES MELLITUS WITH PERIPHERAL VASCULAR DISEASE (HCC): ICD-10-CM

## 2025-01-24 RX ORDER — AMLODIPINE BESYLATE 5 MG/1
5 TABLET ORAL DAILY
Qty: 90 TABLET | Refills: 3 | Status: SHIPPED | OUTPATIENT
Start: 2025-01-24 | End: 2026-01-24

## 2025-01-24 RX ORDER — HYDROCHLOROTHIAZIDE 25 MG/1
25 TABLET ORAL DAILY
Qty: 90 TABLET | Refills: 3 | Status: SHIPPED | OUTPATIENT
Start: 2025-01-24

## 2025-01-24 RX ORDER — ROSUVASTATIN CALCIUM 20 MG/1
20 TABLET, COATED ORAL DAILY
Qty: 90 TABLET | Refills: 3 | Status: SHIPPED | OUTPATIENT
Start: 2025-01-24

## 2025-01-24 NOTE — TELEPHONE ENCOUNTER
[facility] is requesting the following med refills:    AmLODIPine Besylate 5 MG tablet / 90 day supply     HydroCHLOROthiazide (HYDRODIURIL) 25 MG tablet /  90 day supply    MetFORMIN HCL 1000 MG tablet  180 day supply     Rosuvastatin Calcium 20 MG tablet   90-day supply    LOV:  7/31/2024  NOV:  2/5/2025    Please assist. Thank you.

## 2025-02-02 SDOH — ECONOMIC STABILITY: FOOD INSECURITY: WITHIN THE PAST 12 MONTHS, YOU WORRIED THAT YOUR FOOD WOULD RUN OUT BEFORE YOU GOT MONEY TO BUY MORE.: NEVER TRUE

## 2025-02-02 SDOH — ECONOMIC STABILITY: INCOME INSECURITY: IN THE LAST 12 MONTHS, WAS THERE A TIME WHEN YOU WERE NOT ABLE TO PAY THE MORTGAGE OR RENT ON TIME?: NO

## 2025-02-02 SDOH — ECONOMIC STABILITY: FOOD INSECURITY: WITHIN THE PAST 12 MONTHS, THE FOOD YOU BOUGHT JUST DIDN'T LAST AND YOU DIDN'T HAVE MONEY TO GET MORE.: NEVER TRUE

## 2025-02-02 SDOH — ECONOMIC STABILITY: TRANSPORTATION INSECURITY
IN THE PAST 12 MONTHS, HAS LACK OF TRANSPORTATION KEPT YOU FROM MEETINGS, WORK, OR FROM GETTING THINGS NEEDED FOR DAILY LIVING?: NO

## 2025-02-02 SDOH — ECONOMIC STABILITY: TRANSPORTATION INSECURITY
IN THE PAST 12 MONTHS, HAS THE LACK OF TRANSPORTATION KEPT YOU FROM MEDICAL APPOINTMENTS OR FROM GETTING MEDICATIONS?: NO

## 2025-02-05 ENCOUNTER — HOSPITAL ENCOUNTER (OUTPATIENT)
Facility: HOSPITAL | Age: 65
Discharge: HOME | End: 2025-02-08
Payer: COMMERCIAL

## 2025-02-05 ENCOUNTER — OFFICE VISIT (OUTPATIENT)
Facility: CLINIC | Age: 65
End: 2025-02-05
Payer: COMMERCIAL

## 2025-02-05 ENCOUNTER — HOSPITAL ENCOUNTER (OUTPATIENT)
Facility: HOSPITAL | Age: 65
Setting detail: SPECIMEN
Discharge: HOME | End: 2025-02-08

## 2025-02-05 VITALS
OXYGEN SATURATION: 94 % | HEIGHT: 73 IN | SYSTOLIC BLOOD PRESSURE: 136 MMHG | BODY MASS INDEX: 41.75 KG/M2 | WEIGHT: 315 LBS | HEART RATE: 80 BPM | TEMPERATURE: 97.8 F | DIASTOLIC BLOOD PRESSURE: 92 MMHG | RESPIRATION RATE: 18 BRPM

## 2025-02-05 DIAGNOSIS — R22.0 FACIAL MASS: ICD-10-CM

## 2025-02-05 DIAGNOSIS — R05.2 SUBACUTE COUGH: ICD-10-CM

## 2025-02-05 DIAGNOSIS — E66.01 MORBID (SEVERE) OBESITY DUE TO EXCESS CALORIES: ICD-10-CM

## 2025-02-05 DIAGNOSIS — E11.51 TYPE 2 DIABETES MELLITUS WITH DIABETIC PERIPHERAL ANGIOPATHY WITHOUT GANGRENE, WITHOUT LONG-TERM CURRENT USE OF INSULIN (HCC): ICD-10-CM

## 2025-02-05 DIAGNOSIS — S80.812A ABRASION OF LEFT LOWER LEG, INITIAL ENCOUNTER: ICD-10-CM

## 2025-02-05 DIAGNOSIS — R20.2 LEFT HAND PARESTHESIA: Primary | ICD-10-CM

## 2025-02-05 DIAGNOSIS — L20.89 OTHER ATOPIC DERMATITIS: ICD-10-CM

## 2025-02-05 LAB — LABCORP SPECIMEN COLLECTION: NORMAL

## 2025-02-05 PROCEDURE — 99001 SPECIMEN HANDLING PT-LAB: CPT

## 2025-02-05 PROCEDURE — 71046 X-RAY EXAM CHEST 2 VIEWS: CPT

## 2025-02-05 PROCEDURE — 99215 OFFICE O/P EST HI 40 MIN: CPT | Performed by: FAMILY MEDICINE

## 2025-02-05 PROCEDURE — 3075F SYST BP GE 130 - 139MM HG: CPT | Performed by: FAMILY MEDICINE

## 2025-02-05 PROCEDURE — 3051F HG A1C>EQUAL 7.0%<8.0%: CPT | Performed by: FAMILY MEDICINE

## 2025-02-05 PROCEDURE — 3080F DIAST BP >= 90 MM HG: CPT | Performed by: FAMILY MEDICINE

## 2025-02-05 RX ORDER — TRIAMCINOLONE ACETONIDE 0.25 MG/G
CREAM TOPICAL
Qty: 80 G | Refills: 0 | Status: SHIPPED | OUTPATIENT
Start: 2025-02-05

## 2025-02-05 ASSESSMENT — PATIENT HEALTH QUESTIONNAIRE - PHQ9
1. LITTLE INTEREST OR PLEASURE IN DOING THINGS: NOT AT ALL
SUM OF ALL RESPONSES TO PHQ QUESTIONS 1-9: 0
SUM OF ALL RESPONSES TO PHQ9 QUESTIONS 1 & 2: 0
SUM OF ALL RESPONSES TO PHQ QUESTIONS 1-9: 0
2. FEELING DOWN, DEPRESSED OR HOPELESS: NOT AT ALL

## 2025-02-05 NOTE — PROGRESS NOTES
Henrik Muir Jr. is a 64 y.o. year old male who presents today for No chief complaint on file.       \"Have you been to the ER, urgent care clinic since your last visit?  Hospitalized since your last visit?\"   NO     “Have you seen or consulted any other health care providers outside our system since your last visit?”   NO             Anne Cordon [facility]  [address] [place] [address] [id number]  Ph: [phone]  Direct Fax: [phone]

## 2025-02-05 NOTE — PROGRESS NOTES
Henrik Muir Jr. is a 64 y.o.  male and presents with    Chief Complaint   Patient presents with    Medication Refill       Subjective:  Mr. Muir presents today with several complaints  He developed a cough mid December 2024 after returning from a [employer] meeting in [place]. Pt endorses light yellow phlegm and denies hemoptysis.  He cough does wake him up at night. OTC cough syrup has not ceased his coughing. He denies rhinorrhea, congestion, fever, SOB.  Intermittent left hand digits 1-3 numbness and tingling. Pt had painless nodules on middle phalanx of digits 2 and 3 and distal phalanx of thumb. Pain is dispersed with shaking of the hand.  Tinel's and Phalen's test negative.   Left submandibular, non-tender swelling first noticed 1 week ago by his wife. Pt denies dysphagia, oral dryness.   Pt is part of hospital DM study and is requesting labs - AIC, Lipid panel, diabetes foot exam.             ROS   General ROS: positive for  - sleep disturbance    All other systems reviewed and are negative.      Objective:  BP (!) 136/92 (Site: Left Upper Arm, Position: Sitting, Cuff Size: Thigh)   Pulse 80   Temp 97.8 °F (36.6 °C) (Temporal)   Resp 18   Ht 1.854 m (6' 1\")   Wt (!) 155.6 kg (343 lb)   SpO2 94%   BMI 45.25 kg/m²     alert, well appearing, and in no distress  General appearance - alert, well appearing, and in no distress  Ears - bilateral TM's normal; ear canals dryness and excoriation  Neck - supple; left submandibular non-tender lymphadenopathy   Chest - clear to auscultation, no wheezes, rales or rhonchi, symmetric air entry  Heart - normal rate, regular rhythm, normal S1, S2, no murmurs, rubs, clicks or gallops  Neurological - Left hand digits 1-3 numbness and tingling. Tinel's and Phalen's test negative.   Extremities - peripheral pulses normal, no pedal edema, no clubbing or cyanosis, no ulcers, gangrene or atrophic changes. Left shin healing scabbing wound - pt banged his leg.

## 2025-02-06 LAB
ALBUMIN/CREAT UR: 3 MG/G CREAT (ref 0–29)
BASOPHILS # BLD AUTO: 0.1 X10E3/UL (ref 0–0.2)
BASOPHILS NFR BLD AUTO: 1 %
CHOLEST SERPL-MCNC: 138 MG/DL (ref 100–199)
CREAT UR-MCNC: 125.4 MG/DL
EOSINOPHIL # BLD AUTO: 0.2 X10E3/UL (ref 0–0.4)
EOSINOPHIL NFR BLD AUTO: 2 %
ERYTHROCYTE [DISTWIDTH] IN BLOOD BY AUTOMATED COUNT: 13.6 % (ref 11.6–15.4)
HBA1C MFR BLD: 7.3 % (ref 4.8–5.6)
HCT VFR BLD AUTO: 54.4 % (ref 37.5–51)
HDLC SERPL-MCNC: 39 MG/DL
HGB BLD-MCNC: 17.5 G/DL (ref 13–17.7)
IMM GRANULOCYTES # BLD AUTO: 0.1 X10E3/UL (ref 0–0.1)
IMM GRANULOCYTES NFR BLD AUTO: 1 %
LDLC SERPL CALC-MCNC: 77 MG/DL (ref 0–99)
LYMPHOCYTES # BLD AUTO: 1.9 X10E3/UL (ref 0.7–3.1)
LYMPHOCYTES NFR BLD AUTO: 27 %
MCH RBC QN AUTO: 29.3 PG (ref 26.6–33)
MCHC RBC AUTO-ENTMCNC: 32.2 G/DL (ref 31.5–35.7)
MCV RBC AUTO: 91 FL (ref 79–97)
MICROALBUMIN UR-MCNC: 4.2 UG/ML
MONOCYTES # BLD AUTO: 0.8 X10E3/UL (ref 0.1–0.9)
MONOCYTES NFR BLD AUTO: 12 %
NEUTROPHILS # BLD AUTO: 4.1 X10E3/UL (ref 1.4–7)
NEUTROPHILS NFR BLD AUTO: 57 %
PLATELET # BLD AUTO: 215 X10E3/UL (ref 150–450)
RBC # BLD AUTO: 5.97 X10E6/UL (ref 4.14–5.8)
SPECIMEN STATUS REPORT: NORMAL
TRIGL SERPL-MCNC: 119 MG/DL (ref 0–149)
VLDLC SERPL CALC-MCNC: 22 MG/DL (ref 5–40)
WBC # BLD AUTO: 7.2 X10E3/UL (ref 3.4–10.8)

## 2025-03-21 ENCOUNTER — CLINICAL DOCUMENTATION (OUTPATIENT)
Dept: PHARMACY | Facility: CLINIC | Age: 65
End: 2025-03-21

## 2025-03-21 NOTE — PROGRESS NOTES
above, you will be disqualified from the program and will not receive program incentive in 2026 and must wait until the following calendar year to be eligible for incentive. Please reach out to our team ASAP if there are any questions or concerns.    [facility]   Phone: [phone] Option #3  Email: [email or web address]  Fax Number: [phone]

## 2025-04-15 NOTE — TELEPHONE ENCOUNTER
[facility] sent fax requesting pt's med refill for the following:    Naproxen (NAPROSYN) 375 MG tablet [[id number]]     LOV:  2/5/2025  NOV:  5/5/2025    Please assist. Thank you.

## 2025-04-17 RX ORDER — NAPROXEN 375 MG/1
375 TABLET ORAL 2 TIMES DAILY WITH MEALS
Qty: 60 TABLET | Refills: 1 | Status: SHIPPED | OUTPATIENT
Start: 2025-04-17

## 2025-05-05 ENCOUNTER — OFFICE VISIT (OUTPATIENT)
Facility: CLINIC | Age: 65
End: 2025-05-05
Payer: COMMERCIAL

## 2025-05-05 VITALS
HEART RATE: 60 BPM | WEIGHT: 315 LBS | RESPIRATION RATE: 20 BRPM | HEIGHT: 73 IN | BODY MASS INDEX: 41.75 KG/M2 | OXYGEN SATURATION: 96 % | SYSTOLIC BLOOD PRESSURE: 128 MMHG | DIASTOLIC BLOOD PRESSURE: 84 MMHG | TEMPERATURE: 97.2 F

## 2025-05-05 DIAGNOSIS — E66.01 MORBID (SEVERE) OBESITY DUE TO EXCESS CALORIES (HCC): ICD-10-CM

## 2025-05-05 DIAGNOSIS — B35.1 ONYCHOMYCOSIS: ICD-10-CM

## 2025-05-05 DIAGNOSIS — E11.51 TYPE 2 DIABETES MELLITUS WITH DIABETIC PERIPHERAL ANGIOPATHY WITHOUT GANGRENE, WITHOUT LONG-TERM CURRENT USE OF INSULIN (HCC): ICD-10-CM

## 2025-05-05 DIAGNOSIS — R05.2 SUBACUTE COUGH: ICD-10-CM

## 2025-05-05 DIAGNOSIS — J30.89 NON-SEASONAL ALLERGIC RHINITIS DUE TO OTHER ALLERGIC TRIGGER: ICD-10-CM

## 2025-05-05 DIAGNOSIS — E11.51 TYPE 2 DIABETES MELLITUS WITH PERIPHERAL VASCULAR DISEASE (HCC): ICD-10-CM

## 2025-05-05 DIAGNOSIS — L20.89 OTHER ATOPIC DERMATITIS: ICD-10-CM

## 2025-05-05 DIAGNOSIS — I10 ESSENTIAL (PRIMARY) HYPERTENSION: ICD-10-CM

## 2025-05-05 DIAGNOSIS — Z00.00 ANNUAL PHYSICAL EXAM: Primary | ICD-10-CM

## 2025-05-05 PROCEDURE — 3074F SYST BP LT 130 MM HG: CPT | Performed by: FAMILY MEDICINE

## 2025-05-05 PROCEDURE — 3079F DIAST BP 80-89 MM HG: CPT | Performed by: FAMILY MEDICINE

## 2025-05-05 PROCEDURE — 99396 PREV VISIT EST AGE 40-64: CPT | Performed by: FAMILY MEDICINE

## 2025-05-05 RX ORDER — ALBUTEROL SULFATE 90 UG/1
2 INHALANT RESPIRATORY (INHALATION) 4 TIMES DAILY PRN
Qty: 18 G | Refills: 5 | Status: SHIPPED | OUTPATIENT
Start: 2025-05-05

## 2025-05-05 RX ORDER — TERBINAFINE HYDROCHLORIDE 250 MG/1
250 TABLET ORAL DAILY
Qty: 30 TABLET | Refills: 12 | Status: SHIPPED | OUTPATIENT
Start: 2025-05-05

## 2025-05-05 RX ORDER — CETIRIZINE HYDROCHLORIDE 10 MG/1
10 TABLET ORAL DAILY
Qty: 90 TABLET | Refills: 3 | Status: SHIPPED | OUTPATIENT
Start: 2025-05-05

## 2025-05-05 SDOH — ECONOMIC STABILITY: FOOD INSECURITY: WITHIN THE PAST 12 MONTHS, YOU WORRIED THAT YOUR FOOD WOULD RUN OUT BEFORE YOU GOT MONEY TO BUY MORE.: NEVER TRUE

## 2025-05-05 SDOH — ECONOMIC STABILITY: FOOD INSECURITY: WITHIN THE PAST 12 MONTHS, THE FOOD YOU BOUGHT JUST DIDN'T LAST AND YOU DIDN'T HAVE MONEY TO GET MORE.: NEVER TRUE

## 2025-05-05 ASSESSMENT — PATIENT HEALTH QUESTIONNAIRE - PHQ9
2. FEELING DOWN, DEPRESSED OR HOPELESS: NOT AT ALL
SUM OF ALL RESPONSES TO PHQ QUESTIONS 1-9: 0
1. LITTLE INTEREST OR PLEASURE IN DOING THINGS: NOT AT ALL
SUM OF ALL RESPONSES TO PHQ QUESTIONS 1-9: 0

## 2025-05-05 NOTE — PROGRESS NOTES
Henrik Muir Jr. is a 64 y.o.  male and presents with    Chief Complaint   Patient presents with    Annual Exam    Cough     Subjective:  Well Adult Physical   Patient here for a comprehensive physical exam.The patient reports problems - ongoing wheezing intermittently with cough worse with increased talking  diabetes  Do you take any herbs or supplements that were not prescribed by a doctor? no Are you taking calcium supplements? no Are you taking aspirin daily? yes    Cardiovascular Review:  The patient has diabetes, hypertension, hyperlipidemia, coronary artery disease and obesity.  Diet and Lifestyle: generally follows a low fat low cholesterol diet, generally follows a low sodium diet, follows a diabetic diet regularly, exercises regularly, nonsmoker  Home BP Monitoring: is well controlled at home, ranging 130's/85's.  Pertinent ROS: taking medications as instructed, no medication side effects noted, no TIA's, no chest pain on exertion, no dyspnea on exertion, no swelling of ankles.   Diabetes Mellitus:  He has diabetes mellitus.  Diabetic ROS - medication compliance: compliant all of the time, diabetic diet compliance: compliant most of the time, home glucose monitoring: is performed regularly.  He reports that he has average 130  Lab review: labs reviewed, I note that glycosylated hemoglobin mildly abnormal but acceptable.      ROS   Constitutional: Negative for appetite change, chills, diaphoresis, fatigue and fever.  HENT: Negative for congestion and sore throat.    Respiratory: Positive for cough with yellow phlegm for several months; increase dyspnea with exertion  Cardiovascular: Negative for chest pain and palpitations.  Gastrointestinal: Negative for nausea and vomiting; + constipation with every other day hard stool  Genitourinary: Negative for dysuria.  Musculoskeletal: Negative for back pain.  Skin: negative for : skin lesion changes  Neurological: Negative for dizziness and

## 2025-05-06 LAB — SPECIMEN STATUS REPORT: NORMAL

## 2025-05-08 LAB
ALBUMIN SERPL-MCNC: 4.3 G/DL (ref 3.9–4.9)
ALP SERPL-CCNC: 85 IU/L (ref 44–121)
ALT SERPL-CCNC: 19 IU/L (ref 0–44)
AST SERPL-CCNC: 18 IU/L (ref 0–40)
BILIRUB SERPL-MCNC: 0.5 MG/DL (ref 0–1.2)
BUN SERPL-MCNC: 12 MG/DL (ref 8–27)
BUN/CREAT SERPL: 15 (ref 10–24)
CALCIUM SERPL-MCNC: 9.2 MG/DL (ref 8.6–10.2)
CHLORIDE SERPL-SCNC: 102 MMOL/L (ref 96–106)
CO2 SERPL-SCNC: 26 MMOL/L (ref 20–29)
CREAT SERPL-MCNC: 0.81 MG/DL (ref 0.76–1.27)
EGFRCR SERPLBLD CKD-EPI 2021: 99 ML/MIN/1.73
GLOBULIN SER CALC-MCNC: 2.2 G/DL (ref 1.5–4.5)
GLUCOSE SERPL-MCNC: 120 MG/DL (ref 70–99)
POTASSIUM SERPL-SCNC: 4.1 MMOL/L (ref 3.5–5.2)
PROT SERPL-MCNC: 6.5 G/DL (ref 6–8.5)
SODIUM SERPL-SCNC: 142 MMOL/L (ref 134–144)

## 2025-05-12 ENCOUNTER — RESULTS FOLLOW-UP (OUTPATIENT)
Facility: CLINIC | Age: 65
End: 2025-05-12

## 2025-06-17 DIAGNOSIS — E66.01 MORBID (SEVERE) OBESITY DUE TO EXCESS CALORIES (HCC): ICD-10-CM

## 2025-06-17 DIAGNOSIS — E11.51 TYPE 2 DIABETES MELLITUS WITH DIABETIC PERIPHERAL ANGIOPATHY WITHOUT GANGRENE, WITHOUT LONG-TERM CURRENT USE OF INSULIN (HCC): ICD-10-CM

## 2025-06-19 NOTE — TELEPHONE ENCOUNTER
Medication requested :   Requested Prescriptions     Pending Prescriptions Disp Refills    OZEMPIC, 2 MG/DOSE, 8 MG/3ML SOPN sc injection [Pharmacy Med Name: OZEMPIC (2MG/DOSE) 8MG/3ML PENS] 9 mL 4     Sig: INJECT 2MG UNDER THE SKIN ONCE WEEKLY      PCP: Dominic Clifford MD  LOV:           5/5/2025   NOV DMA: 11/5/2025  FUTURE APPT:   Future Appointments   Date Time Provider Department Center   11/5/2025  9:15 AM Dominic Clifford MD DMA [facility] DEP       Thank you.

## 2025-06-20 LAB
CHOLEST SERPL-MCNC: 137 MG/DL
EST. AVERAGE GLUCOSE BLD GHB EST-MCNC: 168 MG/DL
GLUCOSE SERPL-MCNC: 128 MG/DL (ref 74–108)
HBA1C MFR BLD: 7.5 % (ref 4.2–5.6)
HDLC SERPL-MCNC: 38 MG/DL (ref 40–60)
LDLC SERPL CALC-MCNC: 78 MG/DL (ref 0–100)
TRIGL SERPL-MCNC: 106 MG/DL (ref 0–150)

## 2025-06-25 RX ORDER — SEMAGLUTIDE 2.68 MG/ML
INJECTION, SOLUTION SUBCUTANEOUS
Qty: 9 ML | Refills: 4 | Status: SHIPPED | OUTPATIENT
Start: 2025-06-25

## 2025-07-07 DIAGNOSIS — R05.2 SUBACUTE COUGH: ICD-10-CM

## 2025-07-07 DIAGNOSIS — E66.01 MORBID (SEVERE) OBESITY DUE TO EXCESS CALORIES (HCC): ICD-10-CM

## 2025-07-07 DIAGNOSIS — E11.51 TYPE 2 DIABETES MELLITUS WITH DIABETIC PERIPHERAL ANGIOPATHY WITHOUT GANGRENE, WITHOUT LONG-TERM CURRENT USE OF INSULIN (HCC): ICD-10-CM

## 2025-07-07 NOTE — TELEPHONE ENCOUNTER
Pt called in to request refill for Ozempic & inhaler, [facility] confirmed they did not receive the request back in June    LOV: 5.5.25    NOV: 11.5.25

## 2025-07-08 RX ORDER — ALBUTEROL SULFATE 90 UG/1
2 INHALANT RESPIRATORY (INHALATION) 4 TIMES DAILY PRN
Qty: 18 G | Refills: 5 | Status: SHIPPED | OUTPATIENT
Start: 2025-07-08

## 2025-07-08 RX ORDER — SEMAGLUTIDE 2.68 MG/ML
INJECTION, SOLUTION SUBCUTANEOUS
Qty: 9 ML | Refills: 4 | Status: SHIPPED | OUTPATIENT
Start: 2025-07-08

## 2025-07-08 NOTE — TELEPHONE ENCOUNTER
Medication requested :   Requested Prescriptions     Pending Prescriptions Disp Refills    albuterol sulfate HFA (VENTOLIN HFA) 108 (90 Base) MCG/ACT inhaler 18 g 5     Sig: Inhale 2 puffs into the lungs 4 times daily as needed for Wheezing    semaglutide, 2 MG/DOSE, (OZEMPIC, 2 MG/DOSE,) 8 MG/3ML SOPN sc injection 9 mL 4      PCP: Dominic Clifford MD  LOV:           5/5/2025   NOV DMA: 11/5/2025  FUTURE APPT:   Future Appointments   Date Time Provider Department Center   11/5/2025  9:15 AM Dominic Clifford MD [facility] DEP       Thank you.

## 2025-07-14 DIAGNOSIS — R05.2 SUBACUTE COUGH: ICD-10-CM

## 2025-07-14 DIAGNOSIS — E66.01 MORBID (SEVERE) OBESITY DUE TO EXCESS CALORIES (HCC): ICD-10-CM

## 2025-07-14 DIAGNOSIS — E11.51 TYPE 2 DIABETES MELLITUS WITH DIABETIC PERIPHERAL ANGIOPATHY WITHOUT GANGRENE, WITHOUT LONG-TERM CURRENT USE OF INSULIN (HCC): ICD-10-CM

## 2025-07-14 RX ORDER — ALBUTEROL SULFATE 90 UG/1
2 INHALANT RESPIRATORY (INHALATION) 4 TIMES DAILY PRN
Qty: 18 G | Refills: 5 | Status: SHIPPED | OUTPATIENT
Start: 2025-07-14

## 2025-07-14 RX ORDER — SEMAGLUTIDE 2.68 MG/ML
INJECTION, SOLUTION SUBCUTANEOUS
Qty: 9 ML | Refills: 4 | Status: SHIPPED | OUTPATIENT
Start: 2025-07-14

## 2025-07-14 NOTE — TELEPHONE ENCOUNTER
Medication(s) requesting:   Requested Prescriptions     Pending Prescriptions Disp Refills    albuterol sulfate HFA (VENTOLIN HFA) 108 (90 Base) MCG/ACT inhaler 18 g 5     Sig: Inhale 2 puffs into the lungs 4 times daily as needed for Wheezing    semaglutide, 2 MG/DOSE, (OZEMPIC, 2 MG/DOSE,) 8 MG/3ML SOPN sc injection 9 mL 4     Sig: INJECT 2MG UNDER THE SKIN ONCE WEEKLY       Last office visit:  05/05/25  Next office visit DMA: 11/5/2025

## 2025-08-15 ENCOUNTER — CLINICAL DOCUMENTATION (OUTPATIENT)
Dept: PHARMACY | Facility: CLINIC | Age: 65
End: 2025-08-15

## (undated) DEVICE — KIT COLON W/ 1.1OZ LUB AND 2 END

## (undated) DEVICE — KENDALL 500 SERIES DIAPHORETIC FOAM MONITORING ELECTRODE - TEAR DROP SHAPE ( 30/PK): Brand: KENDALL

## (undated) DEVICE — TRAP SPEC COLL POLYP POLYSTYR --

## (undated) DEVICE — FLEX ADVANTAGE 1500CC: Brand: FLEX ADVANTAGE

## (undated) DEVICE — CONTAINER PREFIL FRMLN 15ML --

## (undated) DEVICE — FORCEPS BX L240CM JAW DIA2.8MM L CAP W/ NDL MIC MESH TOOTH

## (undated) DEVICE — SYR 50ML SLIP TIP NSAF LF STRL --

## (undated) DEVICE — SNARE ENDO 2.4MMX230CM -- COLD EXACTO

## (undated) DEVICE — BASIN EMESIS 500CC ROSE 250/CS 60/PLT: Brand: MEDEGEN MEDICAL PRODUCTS, LLC

## (undated) DEVICE — DEVICE INFL 60ML 12ATM CONVENIENT LOK REL HNDL HI PRSS FLX

## (undated) DEVICE — MEDI-VAC NON-CONDUCTIVE SUCTION TUBING: Brand: CARDINAL HEALTH